# Patient Record
Sex: MALE | Race: WHITE | NOT HISPANIC OR LATINO | Employment: OTHER | URBAN - METROPOLITAN AREA
[De-identification: names, ages, dates, MRNs, and addresses within clinical notes are randomized per-mention and may not be internally consistent; named-entity substitution may affect disease eponyms.]

---

## 2017-01-17 ENCOUNTER — GENERIC CONVERSION - ENCOUNTER (OUTPATIENT)
Dept: OTHER | Facility: OTHER | Age: 72
End: 2017-01-17

## 2017-01-23 ENCOUNTER — GENERIC CONVERSION - ENCOUNTER (OUTPATIENT)
Dept: OTHER | Facility: OTHER | Age: 72
End: 2017-01-23

## 2017-01-30 ENCOUNTER — GENERIC CONVERSION - ENCOUNTER (OUTPATIENT)
Dept: OTHER | Facility: OTHER | Age: 72
End: 2017-01-30

## 2017-01-31 ENCOUNTER — GENERIC CONVERSION - ENCOUNTER (OUTPATIENT)
Dept: OTHER | Facility: OTHER | Age: 72
End: 2017-01-31

## 2017-02-06 ENCOUNTER — GENERIC CONVERSION - ENCOUNTER (OUTPATIENT)
Dept: OTHER | Facility: OTHER | Age: 72
End: 2017-02-06

## 2017-02-17 ENCOUNTER — GENERIC CONVERSION - ENCOUNTER (OUTPATIENT)
Dept: OTHER | Facility: OTHER | Age: 72
End: 2017-02-17

## 2017-02-28 ENCOUNTER — GENERIC CONVERSION - ENCOUNTER (OUTPATIENT)
Dept: OTHER | Facility: OTHER | Age: 72
End: 2017-02-28

## 2017-03-10 ENCOUNTER — GENERIC CONVERSION - ENCOUNTER (OUTPATIENT)
Dept: OTHER | Facility: OTHER | Age: 72
End: 2017-03-10

## 2017-03-31 ENCOUNTER — GENERIC CONVERSION - ENCOUNTER (OUTPATIENT)
Dept: OTHER | Facility: OTHER | Age: 72
End: 2017-03-31

## 2017-04-14 ENCOUNTER — GENERIC CONVERSION - ENCOUNTER (OUTPATIENT)
Dept: OTHER | Facility: OTHER | Age: 72
End: 2017-04-14

## 2017-04-19 ENCOUNTER — GENERIC CONVERSION - ENCOUNTER (OUTPATIENT)
Dept: OTHER | Facility: OTHER | Age: 72
End: 2017-04-19

## 2017-04-24 ENCOUNTER — GENERIC CONVERSION - ENCOUNTER (OUTPATIENT)
Dept: OTHER | Facility: OTHER | Age: 72
End: 2017-04-24

## 2017-04-27 ENCOUNTER — GENERIC CONVERSION - ENCOUNTER (OUTPATIENT)
Dept: OTHER | Facility: OTHER | Age: 72
End: 2017-04-27

## 2017-05-16 ENCOUNTER — ALLSCRIPTS OFFICE VISIT (OUTPATIENT)
Dept: OTHER | Facility: OTHER | Age: 72
End: 2017-05-16

## 2017-06-15 ENCOUNTER — GENERIC CONVERSION - ENCOUNTER (OUTPATIENT)
Dept: OTHER | Facility: OTHER | Age: 72
End: 2017-06-15

## 2017-06-26 ENCOUNTER — GENERIC CONVERSION - ENCOUNTER (OUTPATIENT)
Dept: OTHER | Facility: OTHER | Age: 72
End: 2017-06-26

## 2017-06-27 ENCOUNTER — LAB REQUISITION (OUTPATIENT)
Dept: LAB | Facility: HOSPITAL | Age: 72
End: 2017-06-27
Payer: MEDICARE

## 2017-06-27 DIAGNOSIS — N39.0 URINARY TRACT INFECTION: ICD-10-CM

## 2017-06-27 PROCEDURE — 87086 URINE CULTURE/COLONY COUNT: CPT | Performed by: UROLOGY

## 2017-06-28 ENCOUNTER — ANESTHESIA EVENT (OUTPATIENT)
Dept: PERIOP | Facility: HOSPITAL | Age: 72
End: 2017-06-28
Payer: MEDICARE

## 2017-06-28 ENCOUNTER — APPOINTMENT (OUTPATIENT)
Dept: LAB | Facility: HOSPITAL | Age: 72
End: 2017-06-28
Payer: MEDICARE

## 2017-06-28 ENCOUNTER — TRANSCRIBE ORDERS (OUTPATIENT)
Dept: LAB | Facility: HOSPITAL | Age: 72
End: 2017-06-28

## 2017-06-28 DIAGNOSIS — R31.0 GROSS HEMATURIA: Primary | ICD-10-CM

## 2017-06-28 LAB — BACTERIA UR CULT: NORMAL

## 2017-06-28 PROCEDURE — 88112 CYTOPATH CELL ENHANCE TECH: CPT

## 2017-07-03 ENCOUNTER — APPOINTMENT (OUTPATIENT)
Dept: RADIOLOGY | Facility: HOSPITAL | Age: 72
End: 2017-07-03
Payer: MEDICARE

## 2017-07-03 ENCOUNTER — HOSPITAL ENCOUNTER (OUTPATIENT)
Facility: HOSPITAL | Age: 72
Setting detail: OUTPATIENT SURGERY
Discharge: HOME | End: 2017-07-03
Attending: UROLOGY | Admitting: UROLOGY
Payer: MEDICARE

## 2017-07-03 ENCOUNTER — ANESTHESIA (OUTPATIENT)
Dept: PERIOP | Facility: HOSPITAL | Age: 72
End: 2017-07-03
Payer: MEDICARE

## 2017-07-03 VITALS
HEART RATE: 61 BPM | WEIGHT: 171 LBS | DIASTOLIC BLOOD PRESSURE: 63 MMHG | SYSTOLIC BLOOD PRESSURE: 131 MMHG | RESPIRATION RATE: 16 BRPM | BODY MASS INDEX: 24.48 KG/M2 | HEIGHT: 70 IN | OXYGEN SATURATION: 94 % | TEMPERATURE: 98.4 F

## 2017-07-03 DIAGNOSIS — N32.9 BLADDER DISORDER: ICD-10-CM

## 2017-07-03 PROCEDURE — 88305 TISSUE EXAM BY PATHOLOGIST: CPT | Performed by: UROLOGY

## 2017-07-03 PROCEDURE — C2625 STENT, NON-COR, TEM W/DEL SY: HCPCS | Performed by: UROLOGY

## 2017-07-03 PROCEDURE — 74450 X-RAY URETHRA/BLADDER: CPT

## 2017-07-03 PROCEDURE — 88342 IMHCHEM/IMCYTCHM 1ST ANTB: CPT | Performed by: UROLOGY

## 2017-07-03 DEVICE — STENT KWART RETRO INJECT SET 6FR 145CM: Type: IMPLANTABLE DEVICE | Site: BLADDER | Status: FUNCTIONAL

## 2017-07-03 RX ORDER — FENTANYL CITRATE 50 UG/ML
INJECTION, SOLUTION INTRAMUSCULAR; INTRAVENOUS AS NEEDED
Status: DISCONTINUED | OUTPATIENT
Start: 2017-07-03 | End: 2017-07-03 | Stop reason: SURG

## 2017-07-03 RX ORDER — ACETAMINOPHEN 325 MG/1
650 TABLET ORAL EVERY 6 HOURS PRN
Status: DISCONTINUED | OUTPATIENT
Start: 2017-07-03 | End: 2017-07-03 | Stop reason: HOSPADM

## 2017-07-03 RX ORDER — CEPHALEXIN 500 MG/1
500 CAPSULE ORAL EVERY 8 HOURS SCHEDULED
Qty: 12 CAPSULE | Refills: 0 | Status: SHIPPED | OUTPATIENT
Start: 2017-07-03 | End: 2017-07-07

## 2017-07-03 RX ORDER — ONDANSETRON 2 MG/ML
4 INJECTION INTRAMUSCULAR; INTRAVENOUS ONCE AS NEEDED
Status: DISCONTINUED | OUTPATIENT
Start: 2017-07-03 | End: 2017-07-03 | Stop reason: HOSPADM

## 2017-07-03 RX ORDER — FENTANYL CITRATE/PF 50 MCG/ML
50 SYRINGE (ML) INJECTION
Status: DISCONTINUED | OUTPATIENT
Start: 2017-07-03 | End: 2017-07-03 | Stop reason: HOSPADM

## 2017-07-03 RX ORDER — ONDANSETRON 2 MG/ML
INJECTION INTRAMUSCULAR; INTRAVENOUS AS NEEDED
Status: DISCONTINUED | OUTPATIENT
Start: 2017-07-03 | End: 2017-07-03 | Stop reason: SURG

## 2017-07-03 RX ORDER — EPHEDRINE SULFATE 50 MG/ML
INJECTION, SOLUTION INTRAVENOUS AS NEEDED
Status: DISCONTINUED | OUTPATIENT
Start: 2017-07-03 | End: 2017-07-03 | Stop reason: SURG

## 2017-07-03 RX ORDER — MAGNESIUM HYDROXIDE 1200 MG/15ML
LIQUID ORAL AS NEEDED
Status: DISCONTINUED | OUTPATIENT
Start: 2017-07-03 | End: 2017-07-03 | Stop reason: HOSPADM

## 2017-07-03 RX ORDER — LIDOCAINE HYDROCHLORIDE 10 MG/ML
INJECTION, SOLUTION INFILTRATION; PERINEURAL AS NEEDED
Status: DISCONTINUED | OUTPATIENT
Start: 2017-07-03 | End: 2017-07-03 | Stop reason: SURG

## 2017-07-03 RX ORDER — OXYCODONE HYDROCHLORIDE AND ACETAMINOPHEN 5; 325 MG/1; MG/1
1 TABLET ORAL EVERY 4 HOURS PRN
Status: DISCONTINUED | OUTPATIENT
Start: 2017-07-03 | End: 2017-07-03 | Stop reason: HOSPADM

## 2017-07-03 RX ORDER — SODIUM CHLORIDE 9 MG/ML
125 INJECTION, SOLUTION INTRAVENOUS CONTINUOUS
Status: DISCONTINUED | OUTPATIENT
Start: 2017-07-03 | End: 2017-07-03 | Stop reason: HOSPADM

## 2017-07-03 RX ORDER — PROPOFOL 10 MG/ML
INJECTION, EMULSION INTRAVENOUS AS NEEDED
Status: DISCONTINUED | OUTPATIENT
Start: 2017-07-03 | End: 2017-07-03 | Stop reason: SURG

## 2017-07-03 RX ORDER — ALBUTEROL SULFATE 2.5 MG/3ML
2.5 SOLUTION RESPIRATORY (INHALATION) ONCE AS NEEDED
Status: DISCONTINUED | OUTPATIENT
Start: 2017-07-03 | End: 2017-07-03 | Stop reason: HOSPADM

## 2017-07-03 RX ADMIN — LIDOCAINE HYDROCHLORIDE 50 MG: 10 INJECTION, SOLUTION INFILTRATION; PERINEURAL at 07:27

## 2017-07-03 RX ADMIN — DEXAMETHASONE SODIUM PHOSPHATE 4 MG: 10 INJECTION INTRAMUSCULAR; INTRAVENOUS at 07:31

## 2017-07-03 RX ADMIN — PROPOFOL 200 MG: 10 INJECTION, EMULSION INTRAVENOUS at 07:27

## 2017-07-03 RX ADMIN — CEFAZOLIN SODIUM 2000 MG: 2 SOLUTION INTRAVENOUS at 07:29

## 2017-07-03 RX ADMIN — EPHEDRINE SULFATE 7.5 MG: 50 INJECTION, SOLUTION INTRAMUSCULAR; INTRAVENOUS; SUBCUTANEOUS at 08:00

## 2017-07-03 RX ADMIN — ACETAMINOPHEN 650 MG: 325 TABLET, FILM COATED ORAL at 09:38

## 2017-07-03 RX ADMIN — FENTANYL CITRATE 100 MCG: 50 INJECTION, SOLUTION INTRAMUSCULAR; INTRAVENOUS at 07:27

## 2017-07-03 RX ADMIN — SODIUM CHLORIDE 125 ML/HR: 0.9 INJECTION, SOLUTION INTRAVENOUS at 06:29

## 2017-07-03 RX ADMIN — ONDANSETRON HYDROCHLORIDE 4 MG: 2 INJECTION, SOLUTION INTRAVENOUS at 07:31

## 2017-07-05 ENCOUNTER — GENERIC CONVERSION - ENCOUNTER (OUTPATIENT)
Dept: OTHER | Facility: OTHER | Age: 72
End: 2017-07-05

## 2017-07-11 ENCOUNTER — ALLSCRIPTS OFFICE VISIT (OUTPATIENT)
Dept: OTHER | Facility: OTHER | Age: 72
End: 2017-07-11

## 2017-07-24 ENCOUNTER — GENERIC CONVERSION - ENCOUNTER (OUTPATIENT)
Dept: OTHER | Facility: OTHER | Age: 72
End: 2017-07-24

## 2017-08-14 ENCOUNTER — GENERIC CONVERSION - ENCOUNTER (OUTPATIENT)
Dept: OTHER | Facility: OTHER | Age: 72
End: 2017-08-14

## 2017-08-22 ENCOUNTER — ALLSCRIPTS OFFICE VISIT (OUTPATIENT)
Dept: OTHER | Facility: OTHER | Age: 72
End: 2017-08-22

## 2017-08-25 ENCOUNTER — ALLSCRIPTS OFFICE VISIT (OUTPATIENT)
Dept: OTHER | Facility: OTHER | Age: 72
End: 2017-08-25

## 2017-09-07 ENCOUNTER — ALLSCRIPTS OFFICE VISIT (OUTPATIENT)
Dept: OTHER | Facility: OTHER | Age: 72
End: 2017-09-07

## 2017-09-13 ENCOUNTER — GENERIC CONVERSION - ENCOUNTER (OUTPATIENT)
Dept: OTHER | Facility: OTHER | Age: 72
End: 2017-09-13

## 2017-09-14 ENCOUNTER — GENERIC CONVERSION - ENCOUNTER (OUTPATIENT)
Dept: OTHER | Facility: OTHER | Age: 72
End: 2017-09-14

## 2017-11-01 DIAGNOSIS — C67.2 MALIGNANT NEOPLASM OF LATERAL WALL OF BLADDER (HCC): ICD-10-CM

## 2017-11-01 DIAGNOSIS — E78.5 HYPERLIPIDEMIA: ICD-10-CM

## 2017-11-01 DIAGNOSIS — R93.89 ABNORMAL FINDINGS ON DIAGNOSTIC IMAGING OF OTHER SPECIFIED BODY STRUCTURES: ICD-10-CM

## 2017-11-06 ENCOUNTER — GENERIC CONVERSION - ENCOUNTER (OUTPATIENT)
Dept: OTHER | Facility: OTHER | Age: 72
End: 2017-11-06

## 2017-11-09 ENCOUNTER — GENERIC CONVERSION - ENCOUNTER (OUTPATIENT)
Dept: OTHER | Facility: OTHER | Age: 72
End: 2017-11-09

## 2017-11-15 ENCOUNTER — GENERIC CONVERSION - ENCOUNTER (OUTPATIENT)
Dept: OTHER | Facility: OTHER | Age: 72
End: 2017-11-15

## 2017-11-24 ENCOUNTER — ALLSCRIPTS OFFICE VISIT (OUTPATIENT)
Dept: OTHER | Facility: OTHER | Age: 72
End: 2017-11-24

## 2017-11-25 ENCOUNTER — GENERIC CONVERSION - ENCOUNTER (OUTPATIENT)
Dept: OTHER | Facility: OTHER | Age: 72
End: 2017-11-25

## 2017-11-25 NOTE — PROGRESS NOTES
Assessment  Assessed    1  Chronic stable angina (413 9) (I20 8)   2  CAD (coronary artery disease) (414 00) (I25 10)   3  Hyperlipidemia (272 4) (E78 5)   4  Benign essential HTN (401 1) (I10)   5  RBBB (right bundle branch block) (426 4) (I45 10)   6  Abnormal CT scan (793 99) (R93 8)   7  Mitral regurgitation (424 0) (I34 0)    Plan  Abnormal CT scan    · (1) NT- BNP (PRO BRAIN NATRIURETIC PEPTIDE); Status:Active; Requestedfor:24Nov2017;    Perform:Merged with [facility]; AVZ:[id number]; Ordered; For:Abnormal CT scan; Ordered By:Bhargav Recinos;  Benign essential HTN, RBBB (right bundle branch block)    · EKG/ECG- POC; Status:Complete;   Done: 29RGK3077 02:58PM   Perform: In Office; Last Updated By:Cynthia Munoz; 11/24/2017 2:58:43 PM;Ordered;essential HTN, RBBB (right bundle branch block); Ordered By:Denise Recinos;    Discussion/Summary  Cardiology Discussion Summary Free Text Note Form [facility]: It is my impression that the patient had prolonged angina probably triggered by anemia from chemotherapy  He does have rather severe circumflex disease which was a non bypassed vessel in 2004  His ischemia on stress testing appear to be in that distribution  I do not feel doing cardiac catheterization will be fruitful since he was unable to have intervention there 2 years ago  His angina is gone now that his hemoglobin is better  We will continue metoprolol and ranolazine  Nitrates were added and we will continue these as well  He did have an abnormal lung exam at the bases  I doubt this represents heart failure but did order a BNP assay to rule out any possibility that congestive heart failure may be creating more angina  His blood pressure is quite good on beta-blockers and amlodipine  He did have moderate to severe mitral regurgitation on his echo but this is not audible and I suspect this was dynamic due to ischemia in the circumflex distribution at that time   He does have a bifascicular block but no evidence for bradycardia arrhythmias  His lipids were excellent on his current dose of atorvastatin which he will continue  I will see him in February is planned  We will follow up with him regarding his blood work in the interim  Chief Complaint  Chief Complaint Free Text Note Form: here ahead of time due to chest pain    in [facility] 11/16 to 11/18CAD s/p CABG 2004 (L-LAD, SVG to PDA with cath 2015 showing patent grafts with severe disease CX not amenable to PCI)has HTN and HLP   ? RVE and hypokinesis with moderate to severe MR on recent echo at [facility]: Patient is here today for follow up of chronic conditions described in HPI  History of Present Illness  Cardiology HPI Free Text Note Form [facility]: The patient had chemorx about 2 weeks ago  He did have increased chest pain and pain at rest and was admitted to [facility] last week  He had a mild troponin elevation  He was sent home on his usual meds plus imdur  He continued to have chest pain but was transfused on Tuesday and has not had angina since  He did have a hemoglobin of 9 prior to transfusion  He did have dyspnea with the angina  His stress test suggested ischemia in the distribution of the CX vessel  He does get some occasional ankle edema  He denies orthopnea  He denies palpitatons or lightheadedness  Review of Systems  Cardiology Male ROS:    Cardiac: chest pain-- and-- has swelling in the , but-- No complaints of chest pain, no palpitations, no fainiting ,-- no rhythm problems,-- no fainting/blackouts,-- no heart murmur present-- and-- no palpitations present  Skin: No complaints of nonhealing sores or skin rash    Genitourinary: frequent urination at night,-- loss of bladder control,-- kidney problems-- and-- prostate problems, but-- no recurrent urinary tract infections,-- no difficult urination,-- no blood in urine,-- no kidney stones-- and-- no erectile dysfunction  Psychological: difficulty concentrating, but-- no depression,-- no anxiety-- and-- no palpitations present  General: lack of energy/fatigue, but-- no trouble sleeping,-- no appetite changes,-- no changes in weight,-- no fever,-- no night sweats-- and-- no frequent infections  Respiratory: No complaints of shortness of breath, cough with sputum, or wheezing  HEENT: hearing problems, but-- No complaints of serious problems, hearing problems, nose problems, throat problems, or snoring ,-- no serious eye problems,-- no throat problems-- and-- no snoring  Gastrointestinal: No complaints of liver problems, nausea, vomiting, heartburn, constipation, bloody stools, diarrhea, problems swallowing, adbominal pain, or rectal bleeding  Hematologic: anemia, but-- no bleeding disorders,-- no blood clots-- and-- no excessive bruising  Neurological: No complaints of numbness, tingling, dizziness, weakness, seizures, headaches, syncope or fainting, AM fatigue, daytime sleepiness, no witnessed apnea episodes  Musculoskeletal: back pain, but-- no arthritis-- and-- no swelling/pain   ROS Reviewed:   ROS reviewed  Active Problems  Problems    1  Adenocarcinoma of kidney (189 0) (C64 9)   2  Benign essential HTN (401 1) (I10)   3  Benign essential tremor (333 1) (G25 0)   4  CAD (coronary artery disease) (414 00) (I25 10)   5  Chronic stable angina (413 9) (I20 8)   6  Gross hematuria (599 71) (R31 0)   7  Hyperlipidemia (272 4) (E78 5)   8  Incontinence (788 30) (R32)   9  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 (188 2) (C67 2)   10  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)   11  RBBB (right bundle branch block) (426 4) (I45 10)   12  Sleep disturbance (780 50) (G47 9)    Past Medical History  Problems    1  History of Acquired absence of genital organ (V45 77) (Z90 79)   2  Acquired absence of kidney (V45 73) (Z90 5)   3  History of Calculus, kidney, 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)   4  Gross hematuria (599 71) (R31 0)   5   History of acute cystitis (V13 02) (Z87 440)   6  History of hyperlipidemia (V12 29) (Z86 39)   7  History of hypertension (V12 59) (Z86 79)   8  History of malignant neoplasm of kidney (V10 52) (Z85 528)   9  History of malignant neoplasm of prostate (V10 46) (Z85 46)   10  History of malignant neoplasm of ureter (V10 59) (Z85 54)   11  History of urinary retention (V13 09) (Z87 898)   12  History of 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 (188 2) (C67 2)   13  Personal history of irradiation (V15 3) (Z92 3)   14  History of Primary malignant neoplasm of trigone of urinary bladder (188 0) (C67 0)   15  History of Primary malignant neoplasm of ureteric orifice of urinary bladder (188 6)  (C67 6)   16  History of Prostate cancer (185) (C61)   17  History of Stress incontinence, male (46 35) (N39 3)  Active Problems And Past Medical History Reviewed: The active problems and past medical history were reviewed and updated today  Surgical History  Problems    1  History of Appendectomy   2  History of Back Surgery   3  History of Coronary Artery Double Arterial Graft   4  History of Cystoscopy With Insertion Of Ureteral Stent   5  History of Cystoscopy With Resection Of Tumor   6  History of Cystoscopy With Ureteroscopy Left   7  History of Genito-Urinary Tract Surgery Prostatectomy   8  History of Needle Biopsy Of Prostate   9  History of Nephrectomy With Total Ureterectomy   10  History of Prostatectomy Radical   11  History of Radiation Therapy   12  History of Rotator Cuff Repair   13  History of Shoulder Surgery  Surgical History Reviewed: The surgical history was reviewed and updated today  Family History  Father    1  Family history of myocardial infarction (V17 3) (Z82 49)  Child    2  Family history of hyperlipidemia (V18 19) (Z83 49)  Family History Reviewed: The family history was reviewed and updated today         Social History  Problems    · Caffeine use (V49 89) (F15 90)   · Completed 12th grade   ·    · Never a smoker   · No drug use   · Social alcohol use (Z78 9)  Social History Reviewed: The social history was reviewed and updated today  The social history was reviewed and is unchanged  Current Meds   1  AmLODIPine Besylate 2 5 MG Oral Tablet; TAKE 1 TABLET DAILY; Therapy: (Recorded:54Bdm2551) to Recorded   2  Aspirin 81 MG TABS; Take 1 tablet daily; Therapy: (Recorded:55Rgj8841) to Recorded   3  Atorvastatin Calcium 20 MG Oral Tablet; TAKE 1 TABLET DAILY; Therapy: 48DLU3241 to Recorded   4  CARBOplatin 150 MG/15ML Intravenous Solution; AS DIRECTED   LISTED AS 8 /CARBOPLATOIN AUC5 D1 EVERY 21; Therapy: 55Dey8030 to Recorded   5  Gemzar 1 GM Intravenous Solution Reconstituted; USE AS DIRECTED; Therapy: 28Tns8670 to Recorded   6  Isosorbide Mononitrate ER 30 MG Oral Tablet Extended Release 24 Hour; TAKE 1 TABLET DAILY; Therapy: 21GFN6321 to (MXPVRSRE:62DJT8872) Recorded   7  Metoprolol Tartrate 25 MG Oral Tablet; TAKE 1 TABLET TWICE DAILY; Therapy: (Recorded:06May2015) to Recorded   8  Multi-Vitamins TABS; TAKE 1 TABLET DAILY; Therapy: (Recorded:55Grt9038) to Recorded   9  Ranexa 500 MG Oral Tablet Extended Release 12 Hour; Take 1 tablet twice daily; Therapy: (Recorded:83Ste1890) to Recorded   10  Vitamin B-12 TABS; TAKE 1 TABLET DAILY; Therapy: (Recorded:69Ony6363) to Recorded   11  Vitamin D3 2000 UNIT Oral Capsule; take 1 capsule daily; Therapy: (Recorded:12Nov2015) to Recorded   12  Zofran 4 MG Oral Tablet; Therapy: (Recorded:88Dzk0162) to Recorded  Medication List Reviewed: The medication list was reviewed and updated today  Allergies  Medication    1  Levaquin TABS    Vitals  Vital Signs    Recorded: 97ZWC1777 03:09PM   Heart Rate 79   Systolic 941   Diastolic 70   Weight 484 lb 8 oz   BMI Calculated 24 89   BSA Calculated 1 96       Physical Exam   Constitutional  General appearance: No acute distress, well appearing and well nourished    Eyes  Ophthalmoscopic examination: Abnormal  -- conjunctiva pale, sclera anicteric  Ears, Nose, Mouth, and Throat - Oropharynx: Clear, nares are clear, mucous membranes are moist   Neck  Neck and thyroid: Normal, supple, trachea midline, no thyromegaly  Pulmonary  Auscultation of lungs: Abnormal    Cardiovascular  Auscultation of heart: Abnormal  -- Regular with split S2  grade 1 systolic murmur at RUSB  Kaycee Flores no diastolic murmur rub or gallop noted  Carotid pulses: Normal, 2+ bilaterally  Pedal pulses: Abnormal  -- diminished in the LEs  Examination of extremities for edema and/or varicosities: Normal    Abdomen  Abdomen: Non-tender and no distention  Liver and spleen: No hepatomegaly or splenomegaly  Musculoskeletal Gait and station: Normal gait  -- Digits and nails: Normal without clubbing or cyanosis  -- Inspection/palpation of joints, bones, and muscles: Abnormal -- kyphosis  Neurologic - Cranial nerves: II - XII intact  -- Sensation: No sensory loss     Psychiatric - Orientation to person, place, and time: Normal -- Mood and affect: Normal       Future Appointments    Date/Time Provider Specialty Site   01/25/2018 08:45 AM James Cotter MD Urology [address]       Signatures   Electronically signed by : MINISTERIO Gallagher ; Nov 24 2017  4:12PM EST                       (Author)

## 2018-01-05 ENCOUNTER — HOSPITAL ENCOUNTER (INPATIENT)
Facility: HOSPITAL | Age: 73
LOS: 1 days | Discharge: HOME | DRG: 281 | End: 2018-01-06
Attending: EMERGENCY MEDICINE | Admitting: INTERNAL MEDICINE
Payer: MEDICARE

## 2018-01-05 ENCOUNTER — APPOINTMENT (EMERGENCY)
Dept: RADIOLOGY | Facility: HOSPITAL | Age: 73
DRG: 281 | End: 2018-01-05
Payer: MEDICARE

## 2018-01-05 DIAGNOSIS — R07.9 CHEST PAIN: Primary | ICD-10-CM

## 2018-01-05 DIAGNOSIS — D69.6 THROMBOCYTOPENIA (HCC): ICD-10-CM

## 2018-01-05 PROBLEM — I25.10 CAD (CORONARY ARTERY DISEASE): Chronic | Status: ACTIVE | Noted: 2018-01-05

## 2018-01-05 PROBLEM — D64.9 CHRONIC ANEMIA: Chronic | Status: ACTIVE | Noted: 2018-01-05

## 2018-01-05 PROBLEM — C67.9 TRANSITIONAL CELL BLADDER CANCER (HCC): Chronic | Status: ACTIVE | Noted: 2018-01-05

## 2018-01-05 PROBLEM — D72.829 LEUKOCYTOSIS: Status: ACTIVE | Noted: 2018-01-05

## 2018-01-05 PROBLEM — R06.00 DYSPNEA: Status: ACTIVE | Noted: 2018-01-05

## 2018-01-05 PROBLEM — E78.5 HYPERLIPIDEMIA: Chronic | Status: ACTIVE | Noted: 2018-01-05

## 2018-01-05 PROBLEM — N28.9 ACUTE KIDNEY INSUFFICIENCY: Status: ACTIVE | Noted: 2018-01-05

## 2018-01-05 PROBLEM — I45.10 RBBB: Chronic | Status: ACTIVE | Noted: 2018-01-05

## 2018-01-05 PROBLEM — I20.8 CHRONIC STABLE ANGINA (HCC): Chronic | Status: ACTIVE | Noted: 2018-01-05

## 2018-01-05 LAB
ALBUMIN SERPL BCP-MCNC: 3.5 G/DL (ref 3.5–5)
ALP SERPL-CCNC: 92 U/L (ref 46–116)
ALT SERPL W P-5'-P-CCNC: 114 U/L (ref 12–78)
ANION GAP SERPL CALCULATED.3IONS-SCNC: 8 MMOL/L (ref 4–13)
ANISOCYTOSIS BLD QL SMEAR: PRESENT
AST SERPL W P-5'-P-CCNC: 60 U/L (ref 5–45)
BASOPHILS # BLD MANUAL: 0 THOUSAND/UL (ref 0–0.1)
BASOPHILS NFR MAR MANUAL: 0 % (ref 0–1)
BILIRUB SERPL-MCNC: 0.55 MG/DL (ref 0.2–1)
BUN SERPL-MCNC: 26 MG/DL (ref 5–25)
CALCIUM SERPL-MCNC: 8.9 MG/DL (ref 8.3–10.1)
CHLORIDE SERPL-SCNC: 104 MMOL/L (ref 100–108)
CO2 SERPL-SCNC: 27 MMOL/L (ref 21–32)
CREAT SERPL-MCNC: 1.68 MG/DL (ref 0.6–1.3)
EOSINOPHIL # BLD MANUAL: 0.24 THOUSAND/UL (ref 0–0.4)
EOSINOPHIL NFR BLD MANUAL: 1 % (ref 0–6)
ERYTHROCYTE [DISTWIDTH] IN BLOOD BY AUTOMATED COUNT: 20.5 % (ref 11.6–15.1)
GFR SERPL CREATININE-BSD FRML MDRD: 39 ML/MIN/1.73SQ M
GLUCOSE SERPL-MCNC: 213 MG/DL (ref 65–140)
HCT VFR BLD AUTO: 30.8 % (ref 36.5–49.3)
HGB BLD-MCNC: 9.7 G/DL (ref 12–17)
LYMPHOCYTES # BLD AUTO: 0.49 THOUSAND/UL (ref 0.6–4.47)
LYMPHOCYTES # BLD AUTO: 2 % (ref 14–44)
MCH RBC QN AUTO: 33.4 PG (ref 26.8–34.3)
MCHC RBC AUTO-ENTMCNC: 31.5 G/DL (ref 31.4–37.4)
MCV RBC AUTO: 106 FL (ref 82–98)
METAMYELOCYTES NFR BLD MANUAL: 3 % (ref 0–1)
MONOCYTES # BLD AUTO: 0 THOUSAND/UL (ref 0–1.22)
MONOCYTES NFR BLD: 0 % (ref 4–12)
NEUTROPHILS # BLD MANUAL: 22.8 THOUSAND/UL (ref 1.85–7.62)
NEUTS BAND NFR BLD MANUAL: 34 % (ref 0–8)
NEUTS SEG NFR BLD AUTO: 60 % (ref 43–75)
PLATELET # BLD AUTO: 58 THOUSANDS/UL (ref 149–390)
PLATELET BLD QL SMEAR: ADEQUATE
PMV BLD AUTO: 9.7 FL (ref 8.9–12.7)
POTASSIUM SERPL-SCNC: 4.8 MMOL/L (ref 3.5–5.3)
PROT SERPL-MCNC: 7.4 G/DL (ref 6.4–8.2)
RBC # BLD AUTO: 2.9 MILLION/UL (ref 3.88–5.62)
SODIUM SERPL-SCNC: 139 MMOL/L (ref 136–145)
SPECIMEN SOURCE: NORMAL
TOTAL CELLS COUNTED SPEC: 100
TROPONIN I BLD-MCNC: 0.02 NG/ML (ref 0–0.08)
WBC # BLD AUTO: 24.25 THOUSAND/UL (ref 4.31–10.16)

## 2018-01-05 PROCEDURE — 93005 ELECTROCARDIOGRAM TRACING: CPT

## 2018-01-05 PROCEDURE — 96360 HYDRATION IV INFUSION INIT: CPT

## 2018-01-05 PROCEDURE — 85027 COMPLETE CBC AUTOMATED: CPT | Performed by: EMERGENCY MEDICINE

## 2018-01-05 PROCEDURE — 71046 X-RAY EXAM CHEST 2 VIEWS: CPT

## 2018-01-05 PROCEDURE — 84484 ASSAY OF TROPONIN QUANT: CPT

## 2018-01-05 PROCEDURE — 85007 BL SMEAR W/DIFF WBC COUNT: CPT | Performed by: EMERGENCY MEDICINE

## 2018-01-05 PROCEDURE — 93005 ELECTROCARDIOGRAM TRACING: CPT | Performed by: EMERGENCY MEDICINE

## 2018-01-05 PROCEDURE — 80053 COMPREHEN METABOLIC PANEL: CPT | Performed by: EMERGENCY MEDICINE

## 2018-01-05 PROCEDURE — 36415 COLL VENOUS BLD VENIPUNCTURE: CPT

## 2018-01-05 RX ORDER — ATORVASTATIN CALCIUM 40 MG/1
40 TABLET, FILM COATED ORAL EVERY EVENING
Status: DISCONTINUED | OUTPATIENT
Start: 2018-01-05 | End: 2018-01-06 | Stop reason: HOSPADM

## 2018-01-05 RX ORDER — HEPARIN SODIUM 5000 [USP'U]/ML
5000 INJECTION, SOLUTION INTRAVENOUS; SUBCUTANEOUS EVERY 8 HOURS SCHEDULED
Status: DISCONTINUED | OUTPATIENT
Start: 2018-01-05 | End: 2018-01-06

## 2018-01-05 RX ORDER — ONDANSETRON HYDROCHLORIDE 8 MG/1
TABLET, FILM COATED ORAL EVERY 8 HOURS PRN
COMMUNITY
End: 2018-07-20 | Stop reason: ALTCHOICE

## 2018-01-05 RX ORDER — ASPIRIN 81 MG/1
81 TABLET, CHEWABLE ORAL DAILY
Status: DISCONTINUED | OUTPATIENT
Start: 2018-01-06 | End: 2018-01-06 | Stop reason: HOSPADM

## 2018-01-05 RX ORDER — RANOLAZINE 500 MG/1
500 TABLET, EXTENDED RELEASE ORAL 2 TIMES DAILY
Status: DISCONTINUED | OUTPATIENT
Start: 2018-01-06 | End: 2018-01-06 | Stop reason: HOSPADM

## 2018-01-05 RX ORDER — LORAZEPAM 0.5 MG/1
0.5 TABLET ORAL EVERY 8 HOURS PRN
Status: DISCONTINUED | OUTPATIENT
Start: 2018-01-05 | End: 2018-01-06 | Stop reason: HOSPADM

## 2018-01-05 RX ORDER — MELATONIN
1000 DAILY
Status: DISCONTINUED | OUTPATIENT
Start: 2018-01-06 | End: 2018-01-06 | Stop reason: HOSPADM

## 2018-01-05 RX ORDER — CHOLECALCIFEROL (VITAMIN D3) 125 MCG
1000 CAPSULE ORAL DAILY
Status: DISCONTINUED | OUTPATIENT
Start: 2018-01-06 | End: 2018-01-06 | Stop reason: HOSPADM

## 2018-01-05 RX ORDER — ISOSORBIDE MONONITRATE 60 MG/1
60 TABLET, EXTENDED RELEASE ORAL DAILY
COMMUNITY
End: 2018-01-11 | Stop reason: HOSPADM

## 2018-01-05 RX ORDER — AMLODIPINE BESYLATE 2.5 MG/1
2.5 TABLET ORAL DAILY
Status: DISCONTINUED | OUTPATIENT
Start: 2018-01-06 | End: 2018-01-06 | Stop reason: HOSPADM

## 2018-01-05 RX ORDER — PROCHLORPERAZINE MALEATE 10 MG
10 TABLET ORAL EVERY 6 HOURS PRN
COMMUNITY
End: 2018-06-12 | Stop reason: ALTCHOICE

## 2018-01-05 RX ORDER — SODIUM CHLORIDE 9 MG/ML
75 INJECTION, SOLUTION INTRAVENOUS ONCE
Status: COMPLETED | OUTPATIENT
Start: 2018-01-05 | End: 2018-01-06

## 2018-01-05 RX ORDER — SENNOSIDES 8.6 MG
1 TABLET ORAL
Status: DISCONTINUED | OUTPATIENT
Start: 2018-01-05 | End: 2018-01-06 | Stop reason: HOSPADM

## 2018-01-05 RX ORDER — ASPIRIN 325 MG
325 TABLET ORAL ONCE
Status: DISCONTINUED | OUTPATIENT
Start: 2018-01-05 | End: 2018-01-05

## 2018-01-05 RX ORDER — ONDANSETRON 2 MG/ML
4 INJECTION INTRAMUSCULAR; INTRAVENOUS EVERY 6 HOURS PRN
Status: DISCONTINUED | OUTPATIENT
Start: 2018-01-05 | End: 2018-01-06 | Stop reason: HOSPADM

## 2018-01-05 RX ORDER — ISOSORBIDE MONONITRATE 60 MG/1
60 TABLET, EXTENDED RELEASE ORAL DAILY
Status: DISCONTINUED | OUTPATIENT
Start: 2018-01-06 | End: 2018-01-06

## 2018-01-05 RX ORDER — ASPIRIN 81 MG/1
81 TABLET, CHEWABLE ORAL DAILY
COMMUNITY

## 2018-01-05 RX ORDER — NITROGLYCERIN 0.4 MG/1
0.4 TABLET SUBLINGUAL
Status: DISCONTINUED | OUTPATIENT
Start: 2018-01-05 | End: 2018-01-06 | Stop reason: HOSPADM

## 2018-01-05 RX ORDER — LORAZEPAM 0.5 MG/1
TABLET ORAL EVERY 8 HOURS PRN
COMMUNITY
End: 2018-07-20 | Stop reason: ALTCHOICE

## 2018-01-05 RX ADMIN — SODIUM CHLORIDE 1000 ML: 0.9 INJECTION, SOLUTION INTRAVENOUS at 21:25

## 2018-01-06 ENCOUNTER — HOSPITAL ENCOUNTER (INPATIENT)
Facility: HOSPITAL | Age: 73
LOS: 5 days | Discharge: HOME | DRG: 281 | End: 2018-01-11
Attending: INTERNAL MEDICINE | Admitting: INTERNAL MEDICINE
Payer: MEDICARE

## 2018-01-06 VITALS
OXYGEN SATURATION: 92 % | DIASTOLIC BLOOD PRESSURE: 76 MMHG | BODY MASS INDEX: 24.34 KG/M2 | SYSTOLIC BLOOD PRESSURE: 156 MMHG | HEART RATE: 90 BPM | TEMPERATURE: 99.8 F | WEIGHT: 170 LBS | HEIGHT: 70 IN | RESPIRATION RATE: 18 BRPM

## 2018-01-06 DIAGNOSIS — I25.10 CAD (CORONARY ARTERY DISEASE): Chronic | ICD-10-CM

## 2018-01-06 DIAGNOSIS — D69.6 THROMBOCYTOPENIA (HCC): ICD-10-CM

## 2018-01-06 DIAGNOSIS — N28.9 ACUTE KIDNEY INSUFFICIENCY: ICD-10-CM

## 2018-01-06 DIAGNOSIS — N17.9 AKI (ACUTE KIDNEY INJURY) (HCC): ICD-10-CM

## 2018-01-06 DIAGNOSIS — D72.829 LEUKOCYTOSIS: ICD-10-CM

## 2018-01-06 DIAGNOSIS — C67.9 TRANSITIONAL CELL BLADDER CANCER (HCC): Chronic | ICD-10-CM

## 2018-01-06 DIAGNOSIS — R07.9 CHEST PAIN: Primary | ICD-10-CM

## 2018-01-06 PROBLEM — I21.9 MI (MYOCARDIAL INFARCTION) (HCC): Status: ACTIVE | Noted: 2018-01-06

## 2018-01-06 LAB
ANION GAP SERPL CALCULATED.3IONS-SCNC: 9 MMOL/L (ref 4–13)
APTT PPP: 28 SECONDS (ref 23–35)
BACTERIA UR QL AUTO: ABNORMAL /HPF
BILIRUB UR QL STRIP: NEGATIVE
BUN SERPL-MCNC: 22 MG/DL (ref 5–25)
CALCIUM SERPL-MCNC: 8.2 MG/DL (ref 8.3–10.1)
CHLORIDE SERPL-SCNC: 108 MMOL/L (ref 100–108)
CHOLEST SERPL-MCNC: 103 MG/DL (ref 50–200)
CLARITY UR: ABNORMAL
CO2 SERPL-SCNC: 22 MMOL/L (ref 21–32)
COLOR UR: YELLOW
CREAT SERPL-MCNC: 1.37 MG/DL (ref 0.6–1.3)
DAT POLY-SP REAG RBC QL: NEGATIVE
ERYTHROCYTE [DISTWIDTH] IN BLOOD BY AUTOMATED COUNT: 20.7 % (ref 11.6–15.1)
FIBRINOGEN PPP-MCNC: 337 MG/DL (ref 227–495)
GFR SERPL CREATININE-BSD FRML MDRD: 50 ML/MIN/1.73SQ M
GLUCOSE P FAST SERPL-MCNC: 114 MG/DL (ref 65–99)
GLUCOSE SERPL-MCNC: 114 MG/DL (ref 65–140)
GLUCOSE UR STRIP-MCNC: NEGATIVE MG/DL
HCT VFR BLD AUTO: 28 % (ref 36.5–49.3)
HDLC SERPL-MCNC: 34 MG/DL (ref 40–60)
HGB BLD-MCNC: 8.9 G/DL (ref 12–17)
HGB UR QL STRIP.AUTO: ABNORMAL
INR PPP: 1.12 (ref 0.86–1.16)
KETONES UR STRIP-MCNC: NEGATIVE MG/DL
LDLC SERPL CALC-MCNC: 44 MG/DL (ref 0–100)
LEUKOCYTE ESTERASE UR QL STRIP: ABNORMAL
MAGNESIUM SERPL-MCNC: 1.7 MG/DL (ref 1.6–2.6)
MCH RBC QN AUTO: 33.5 PG (ref 26.8–34.3)
MCHC RBC AUTO-ENTMCNC: 31.8 G/DL (ref 31.4–37.4)
MCV RBC AUTO: 105 FL (ref 82–98)
NITRITE UR QL STRIP: NEGATIVE
NON-SQ EPI CELLS URNS QL MICRO: ABNORMAL /HPF
PH UR STRIP.AUTO: 5.5 [PH] (ref 4.5–8)
PHOSPHATE SERPL-MCNC: 2.7 MG/DL (ref 2.3–4.1)
PLATELET # BLD AUTO: 38 THOUSANDS/UL (ref 149–390)
PLATELET # BLD AUTO: 42 THOUSANDS/UL (ref 149–390)
PMV BLD AUTO: 10.8 FL (ref 8.9–12.7)
PMV BLD AUTO: 11.2 FL (ref 8.9–12.7)
POTASSIUM SERPL-SCNC: 4.7 MMOL/L (ref 3.5–5.3)
PROT UR STRIP-MCNC: ABNORMAL MG/DL
PROTHROMBIN TIME: 14.4 SECONDS (ref 12.1–14.4)
RBC # BLD AUTO: 2.66 MILLION/UL (ref 3.88–5.62)
RBC #/AREA URNS AUTO: ABNORMAL /HPF
SODIUM SERPL-SCNC: 139 MMOL/L (ref 136–145)
SP GR UR STRIP.AUTO: 1.02 (ref 1–1.03)
TRIGL SERPL-MCNC: 124 MG/DL
TROPONIN I SERPL-MCNC: 3.24 NG/ML
TROPONIN I SERPL-MCNC: 5.2 NG/ML
TROPONIN I SERPL-MCNC: 5.81 NG/ML
UROBILINOGEN UR QL STRIP.AUTO: 0.2 E.U./DL
WBC # BLD AUTO: 21.56 THOUSAND/UL (ref 4.31–10.16)
WBC #/AREA URNS AUTO: ABNORMAL /HPF

## 2018-01-06 PROCEDURE — 85027 COMPLETE CBC AUTOMATED: CPT | Performed by: INTERNAL MEDICINE

## 2018-01-06 PROCEDURE — 83010 ASSAY OF HAPTOGLOBIN QUANT: CPT | Performed by: INTERNAL MEDICINE

## 2018-01-06 PROCEDURE — 84484 ASSAY OF TROPONIN QUANT: CPT | Performed by: INTERNAL MEDICINE

## 2018-01-06 PROCEDURE — 85610 PROTHROMBIN TIME: CPT | Performed by: INTERNAL MEDICINE

## 2018-01-06 PROCEDURE — 80048 BASIC METABOLIC PNL TOTAL CA: CPT | Performed by: INTERNAL MEDICINE

## 2018-01-06 PROCEDURE — 93005 ELECTROCARDIOGRAM TRACING: CPT | Performed by: INTERNAL MEDICINE

## 2018-01-06 PROCEDURE — 36415 COLL VENOUS BLD VENIPUNCTURE: CPT | Performed by: INTERNAL MEDICINE

## 2018-01-06 PROCEDURE — 83615 LACTATE (LD) (LDH) ENZYME: CPT | Performed by: INTERNAL MEDICINE

## 2018-01-06 PROCEDURE — 83735 ASSAY OF MAGNESIUM: CPT | Performed by: INTERNAL MEDICINE

## 2018-01-06 PROCEDURE — 85049 AUTOMATED PLATELET COUNT: CPT | Performed by: INTERNAL MEDICINE

## 2018-01-06 PROCEDURE — 87086 URINE CULTURE/COLONY COUNT: CPT | Performed by: INTERNAL MEDICINE

## 2018-01-06 PROCEDURE — 81001 URINALYSIS AUTO W/SCOPE: CPT | Performed by: INTERNAL MEDICINE

## 2018-01-06 PROCEDURE — 80061 LIPID PANEL: CPT | Performed by: INTERNAL MEDICINE

## 2018-01-06 PROCEDURE — 99285 EMERGENCY DEPT VISIT HI MDM: CPT

## 2018-01-06 PROCEDURE — 86880 COOMBS TEST DIRECT: CPT | Performed by: INTERNAL MEDICINE

## 2018-01-06 PROCEDURE — 83625 ASSAY OF LDH ENZYMES: CPT | Performed by: INTERNAL MEDICINE

## 2018-01-06 PROCEDURE — 87077 CULTURE AEROBIC IDENTIFY: CPT | Performed by: INTERNAL MEDICINE

## 2018-01-06 PROCEDURE — 84100 ASSAY OF PHOSPHORUS: CPT | Performed by: INTERNAL MEDICINE

## 2018-01-06 PROCEDURE — 87186 SC STD MICRODIL/AGAR DIL: CPT | Performed by: INTERNAL MEDICINE

## 2018-01-06 PROCEDURE — 85730 THROMBOPLASTIN TIME PARTIAL: CPT | Performed by: INTERNAL MEDICINE

## 2018-01-06 PROCEDURE — 85384 FIBRINOGEN ACTIVITY: CPT | Performed by: INTERNAL MEDICINE

## 2018-01-06 RX ORDER — NITROGLYCERIN 0.4 MG/1
0.4 TABLET SUBLINGUAL
Status: CANCELLED | OUTPATIENT
Start: 2018-01-06

## 2018-01-06 RX ORDER — MELATONIN
1000 DAILY
Status: DISCONTINUED | OUTPATIENT
Start: 2018-01-07 | End: 2018-01-11 | Stop reason: HOSPADM

## 2018-01-06 RX ORDER — FUROSEMIDE 10 MG/ML
40 INJECTION INTRAMUSCULAR; INTRAVENOUS ONCE
Status: DISCONTINUED | OUTPATIENT
Start: 2018-01-06 | End: 2018-01-06

## 2018-01-06 RX ORDER — ATORVASTATIN CALCIUM 40 MG/1
40 TABLET, FILM COATED ORAL EVERY EVENING
Status: CANCELLED | OUTPATIENT
Start: 2018-01-06

## 2018-01-06 RX ORDER — NITROGLYCERIN 0.4 MG/1
0.4 TABLET SUBLINGUAL
Status: DISCONTINUED | OUTPATIENT
Start: 2018-01-06 | End: 2018-01-11 | Stop reason: HOSPADM

## 2018-01-06 RX ORDER — ASPIRIN 81 MG/1
81 TABLET, CHEWABLE ORAL DAILY
Status: DISCONTINUED | OUTPATIENT
Start: 2018-01-07 | End: 2018-01-11 | Stop reason: HOSPADM

## 2018-01-06 RX ORDER — ISOSORBIDE MONONITRATE 30 MG/1
30 TABLET, EXTENDED RELEASE ORAL ONCE
Status: COMPLETED | OUTPATIENT
Start: 2018-01-06 | End: 2018-01-06

## 2018-01-06 RX ORDER — ATORVASTATIN CALCIUM 40 MG/1
40 TABLET, FILM COATED ORAL EVERY EVENING
Status: DISCONTINUED | OUTPATIENT
Start: 2018-01-06 | End: 2018-01-11 | Stop reason: HOSPADM

## 2018-01-06 RX ORDER — MELATONIN
1000 DAILY
Status: CANCELLED | OUTPATIENT
Start: 2018-01-07

## 2018-01-06 RX ORDER — ASPIRIN 81 MG/1
81 TABLET, CHEWABLE ORAL DAILY
Status: CANCELLED | OUTPATIENT
Start: 2018-01-07

## 2018-01-06 RX ORDER — ISOSORBIDE MONONITRATE 60 MG/1
60 TABLET, EXTENDED RELEASE ORAL DAILY
Status: DISCONTINUED | OUTPATIENT
Start: 2018-01-07 | End: 2018-01-09

## 2018-01-06 RX ORDER — CLOPIDOGREL BISULFATE 75 MG/1
300 TABLET ORAL DAILY
Status: DISCONTINUED | OUTPATIENT
Start: 2018-01-06 | End: 2018-01-06

## 2018-01-06 RX ORDER — ISOSORBIDE MONONITRATE 30 MG/1
30 TABLET, EXTENDED RELEASE ORAL DAILY
Status: DISCONTINUED | OUTPATIENT
Start: 2018-01-07 | End: 2018-01-06

## 2018-01-06 RX ORDER — MORPHINE SULFATE 2 MG/ML
1 INJECTION, SOLUTION INTRAMUSCULAR; INTRAVENOUS EVERY 4 HOURS PRN
Status: DISCONTINUED | OUTPATIENT
Start: 2018-01-06 | End: 2018-01-11 | Stop reason: HOSPADM

## 2018-01-06 RX ORDER — SODIUM CHLORIDE 9 MG/ML
75 INJECTION, SOLUTION INTRAVENOUS CONTINUOUS
Status: CANCELLED | OUTPATIENT
Start: 2018-01-06

## 2018-01-06 RX ORDER — FUROSEMIDE 10 MG/ML
20 INJECTION INTRAMUSCULAR; INTRAVENOUS ONCE
Status: COMPLETED | OUTPATIENT
Start: 2018-01-06 | End: 2018-01-06

## 2018-01-06 RX ORDER — MORPHINE SULFATE 2 MG/ML
1 INJECTION, SOLUTION INTRAMUSCULAR; INTRAVENOUS EVERY 4 HOURS PRN
Status: CANCELLED | OUTPATIENT
Start: 2018-01-06

## 2018-01-06 RX ORDER — ISOSORBIDE MONONITRATE 30 MG/1
30 TABLET, EXTENDED RELEASE ORAL DAILY
Status: CANCELLED | OUTPATIENT
Start: 2018-01-07

## 2018-01-06 RX ORDER — LORAZEPAM 0.5 MG/1
0.5 TABLET ORAL EVERY 8 HOURS PRN
Status: DISCONTINUED | OUTPATIENT
Start: 2018-01-06 | End: 2018-01-11 | Stop reason: HOSPADM

## 2018-01-06 RX ORDER — CLOPIDOGREL BISULFATE 75 MG/1
75 TABLET ORAL DAILY
Status: DISCONTINUED | OUTPATIENT
Start: 2018-01-07 | End: 2018-01-09

## 2018-01-06 RX ORDER — SENNOSIDES 8.6 MG
1 TABLET ORAL
Status: DISCONTINUED | OUTPATIENT
Start: 2018-01-06 | End: 2018-01-11 | Stop reason: HOSPADM

## 2018-01-06 RX ORDER — MORPHINE SULFATE 2 MG/ML
1 INJECTION, SOLUTION INTRAMUSCULAR; INTRAVENOUS EVERY 4 HOURS PRN
Status: DISCONTINUED | OUTPATIENT
Start: 2018-01-06 | End: 2018-01-06 | Stop reason: HOSPADM

## 2018-01-06 RX ORDER — AMLODIPINE BESYLATE 2.5 MG/1
2.5 TABLET ORAL DAILY
Status: CANCELLED | OUTPATIENT
Start: 2018-01-07

## 2018-01-06 RX ORDER — CHOLECALCIFEROL (VITAMIN D3) 125 MCG
1000 CAPSULE ORAL DAILY
Status: DISCONTINUED | OUTPATIENT
Start: 2018-01-07 | End: 2018-01-11 | Stop reason: HOSPADM

## 2018-01-06 RX ORDER — SODIUM CHLORIDE 9 MG/ML
75 INJECTION, SOLUTION INTRAVENOUS CONTINUOUS
Status: DISCONTINUED | OUTPATIENT
Start: 2018-01-06 | End: 2018-01-06 | Stop reason: HOSPADM

## 2018-01-06 RX ORDER — RANOLAZINE 500 MG/1
500 TABLET, EXTENDED RELEASE ORAL 2 TIMES DAILY
Status: DISCONTINUED | OUTPATIENT
Start: 2018-01-06 | End: 2018-01-07

## 2018-01-06 RX ORDER — ASPIRIN 325 MG
325 TABLET, DELAYED RELEASE (ENTERIC COATED) ORAL ONCE
Status: COMPLETED | OUTPATIENT
Start: 2018-01-06 | End: 2018-01-06

## 2018-01-06 RX ORDER — ISOSORBIDE MONONITRATE 30 MG/1
30 TABLET, EXTENDED RELEASE ORAL DAILY
Status: DISCONTINUED | OUTPATIENT
Start: 2018-01-06 | End: 2018-01-06 | Stop reason: HOSPADM

## 2018-01-06 RX ORDER — SODIUM CHLORIDE 9 MG/ML
75 INJECTION, SOLUTION INTRAVENOUS CONTINUOUS
Status: DISCONTINUED | OUTPATIENT
Start: 2018-01-06 | End: 2018-01-06

## 2018-01-06 RX ORDER — AMLODIPINE BESYLATE 2.5 MG/1
2.5 TABLET ORAL DAILY
Status: DISCONTINUED | OUTPATIENT
Start: 2018-01-07 | End: 2018-01-09

## 2018-01-06 RX ORDER — LORAZEPAM 0.5 MG/1
0.5 TABLET ORAL EVERY 8 HOURS PRN
Status: CANCELLED | OUTPATIENT
Start: 2018-01-06

## 2018-01-06 RX ORDER — ONDANSETRON 2 MG/ML
4 INJECTION INTRAMUSCULAR; INTRAVENOUS EVERY 6 HOURS PRN
Status: DISCONTINUED | OUTPATIENT
Start: 2018-01-06 | End: 2018-01-11 | Stop reason: HOSPADM

## 2018-01-06 RX ORDER — RANOLAZINE 500 MG/1
500 TABLET, EXTENDED RELEASE ORAL 2 TIMES DAILY
Status: CANCELLED | OUTPATIENT
Start: 2018-01-06

## 2018-01-06 RX ORDER — SENNOSIDES 8.6 MG
1 TABLET ORAL
Status: CANCELLED | OUTPATIENT
Start: 2018-01-06

## 2018-01-06 RX ORDER — ASPIRIN 81 MG/1
81 TABLET, CHEWABLE ORAL DAILY
Status: DISCONTINUED | OUTPATIENT
Start: 2018-01-07 | End: 2018-01-06 | Stop reason: HOSPADM

## 2018-01-06 RX ORDER — CHOLECALCIFEROL (VITAMIN D3) 125 MCG
1000 CAPSULE ORAL DAILY
Status: CANCELLED | OUTPATIENT
Start: 2018-01-07

## 2018-01-06 RX ORDER — ONDANSETRON 2 MG/ML
4 INJECTION INTRAMUSCULAR; INTRAVENOUS EVERY 6 HOURS PRN
Status: CANCELLED | OUTPATIENT
Start: 2018-01-06

## 2018-01-06 RX ADMIN — AMLODIPINE BESYLATE 2.5 MG: 2.5 TABLET ORAL at 08:22

## 2018-01-06 RX ADMIN — METOPROLOL TARTRATE 25 MG: 25 TABLET ORAL at 16:27

## 2018-01-06 RX ADMIN — HEPARIN SODIUM 5000 UNITS: 5000 INJECTION, SOLUTION INTRAVENOUS; SUBCUTANEOUS at 01:00

## 2018-01-06 RX ADMIN — CHOLECALCIFEROL TAB 25 MCG (1000 UNIT) 1000 UNITS: 25 TAB at 08:20

## 2018-01-06 RX ADMIN — NITROGLYCERIN 0.4 MG: 0.4 TABLET SUBLINGUAL at 04:40

## 2018-01-06 RX ADMIN — FUROSEMIDE 20 MG: 10 INJECTION, SOLUTION INTRAMUSCULAR; INTRAVENOUS at 16:28

## 2018-01-06 RX ADMIN — ASPIRIN 325 MG: 325 TABLET, COATED ORAL at 06:00

## 2018-01-06 RX ADMIN — ATORVASTATIN CALCIUM 40 MG: 40 TABLET, FILM COATED ORAL at 17:20

## 2018-01-06 RX ADMIN — RANOLAZINE 500 MG: 500 TABLET, FILM COATED, EXTENDED RELEASE ORAL at 17:20

## 2018-01-06 RX ADMIN — NITROGLYCERIN 1 INCH: 20 OINTMENT TOPICAL at 06:00

## 2018-01-06 RX ADMIN — RANOLAZINE 500 MG: 500 TABLET, FILM COATED, EXTENDED RELEASE ORAL at 08:20

## 2018-01-06 RX ADMIN — CYANOCOBALAMIN TAB 500 MCG 1000 MCG: 500 TAB at 08:20

## 2018-01-06 RX ADMIN — SODIUM CHLORIDE 75 ML/HR: 0.9 INJECTION, SOLUTION INTRAVENOUS at 00:43

## 2018-01-06 RX ADMIN — METOPROLOL TARTRATE 25 MG: 25 TABLET ORAL at 08:22

## 2018-01-06 RX ADMIN — ISOSORBIDE MONONITRATE 30 MG: 30 TABLET, EXTENDED RELEASE ORAL at 08:20

## 2018-01-06 RX ADMIN — CLOPIDOGREL BISULFATE 300 MG: 75 TABLET ORAL at 08:20

## 2018-01-06 RX ADMIN — ISOSORBIDE MONONITRATE 30 MG: 30 TABLET, EXTENDED RELEASE ORAL at 17:21

## 2018-01-06 RX ADMIN — Medication 1 TABLET: at 08:20

## 2018-01-06 NOTE — SOCIAL WORK
Met with patient and wife to complete assessment and explain role of CM  They live in ranch style home with one MALCOM  Wife is retired and available to help and transport at discharge  Prior to admission the patient was independent  He has no DME and no hx of HHC, snf, MH or D&A treatment  Advance directive copy was placed on patient's chart and wife is POA  Patient has a prescription plan and uses [employer], [place]  CM reviewed d/c planning process including the following: identifying help at home, patient preference for d/c planning needs, Discharge Lounge, [facility] Meds to Bed program, availability of treatment team to discuss questions or concerns patient and/or family may have regarding understanding medications and recognizing signs and symptoms once discharged  CM also encouraged patient to follow up with all recommended appointments after discharge  Patient advised of importance for patient and family to participate in managing patients medical well being

## 2018-01-06 NOTE — H&P
History obtained from spouse and child, chart review and the patient  Chief complaint:  Chest pain of 1 day duration  HPI this is a 56-year-old male presenting with substernal chest pain severe intensity associated with dyspnea when picking up a meal around 5 30 p m  this evening  Patient denied nausea vomiting or radiation, pain was localized to the chest area  Pain was relieved with 1 nitro Suboxone per 15 minutes, patient then took another sublingual nitro which relieved pain to 2/10 intensity  Patient then called 911 in the ambulance on the way to ER patient was given 325 mg of aspirin  In the ER patient had troponin drawn which was 0 02 vital signs patient was mildly tachycardic although stable blood pressure 96% on room air  Chest x-ray results pending  EKG showed normal sinus rhythm with right bundle branch block left anterior fascicular block septal infarct age undetermined  Patient has a history of right bundle branch block  Other pertinent labs hemoglobin 9 7 which has been around close to his baseline  Platelet count 58 from baseline of 96  Creatinine on 12/24 was 1 34 GFR was 52 patient's current creatinine is 1 68  Last alk-phos on 12/24 was 122 current 92  AST and ALT also elevated 60/114 respectively on 12/24 they were 53/24  Patient had Neulasta yesterday, he also had Gemzar the last two Wednesdays  Patient is ambulatory  Also has chronic urinary incontinence  Denies fevers chills nausea vomiting  Denies cough  He has an advanced directive living will stating his wife Alex Gallego is POA, also stating he is do  not resuscitate      Active Ambulatory Problems     Diagnosis Date Noted    Chronic anemia 01/05/2018    Chronic stable angina [facility] ) 01/05/2018    CAD (coronary artery disease) 01/05/2018    Hyperlipidemia 01/05/2018    RBBB 01/05/2018    Transitional cell bladder cancer ([facility] ) 01/05/2018     Resolved Ambulatory Problems     Diagnosis Date Noted    No Resolved Ambulatory Problems     Past Medical History:   Diagnosis Date    Anesthesia     Basal cell carcinoma     Coronary artery disease     Essential tremor     Hearing aid worn     History of kidney stones     History of pneumonia     History of prostate cancer     Hx of radiation therapy     Hyperlipidemia     Hypertension     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)     Urinary incontinence     Wears glasses     Wears partial dentures      Active Ambulatory Problems     Diagnosis Date Noted    Chronic anemia 01/05/2018    Chronic stable angina ([facility] ) 01/05/2018    CAD (coronary artery disease) 01/05/2018    Hyperlipidemia 01/05/2018    RBBB 01/05/2018    [facility]/2018     Resolved Ambulatory Problems     Diagnosis Date Noted    No Resolved Ambulatory Problems     Past Medical History:   Diagnosis Date    Anesthesia     Basal cell carcinoma     Coronary artery disease     Essential tremor     Hearing aid worn     History of kidney stones     History of pneumonia     History of prostate cancer     Hx of radiation therapy     Hyperlipidemia     Hypertension     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)     Urinary incontinence     Wears glasses     Wears partial dentures       Past Surgical History:   Procedure Laterality Date    APPENDECTOMY      BACK SURGERY      lumbar herniated disc repair    CARDIAC CATHETERIZATION      CORONARY ARTERY BYPASS GRAFT      x2 in 2004    CYSTOSCOPY      CYSTOSCOPY N/A 7/3/2017    Procedure: BLADDER BIOPSY;  Surgeon: Richi Grimm MD;  Location: [facility];  Service: Urology    CYSTOSCOPY W/ RETROGRADES Right 7/3/2017    Procedure: CYSTOSCOPY WITH RETROGRADE PYELOGRAM;  Surgeon: Richi Grimm MD;  Location: [facility];  Service: Urology    HERNIA REPAIR      NEPHRECTOMY Left     and left ureter    PROSTATECTOMY      ROTATOR CUFF REPAIR Left     SHOULDER SURGERY      dislocation Social History     Nonsmoker  Rare drinker    Family history: father had coronary artery disease and  of [place]  ROS:   Constitutional negative for diaphoresis or fever  HEENT negative for dental problems  Eyes wears glasses  Hearing wears hearing aids  Cardiovascular as above HPI, denies orthopnea PND or leg edema   as above, denies hematuria  Musculoskeletal negative for back pain neck pain or neck stiffness  Skin negative for rashes  Neuro negative for dizziness weakness lightheadedness  Psychiatric negative for agitation  All other systems negative  Physical exam  Vitals:    18 2225   BP: 130/67   Pulse: 76   Resp: 18   Temp:    SpO2: 96%     General no apparent distress appears appropriate age  HEENT glasses hearing aids  moist mucous membranes  Lungs clear to auscultation bilaterally  No crackles rhonchi  Cardiac S1-S2 regular rhythm 2/6 systolic murmur  Abdomen: soft nontender nondistended positive bowel sounds  Extremities no edema  Skin no rashes  Labs use as above  Patient Active Problem List   Diagnosis    Chest pain    Leukocytosis    Chronic anemia    Acute kidney insufficiency    Chronic stable angina (HCC)    Dyspnea    CAD (coronary artery disease)    Hyperlipidemia    RBBB    Transitional cell bladder cancer (HCC)       Assessment plan:    Place under observation    Chest pain:  Cardiology consult follow troponins check EKG in the morning aspirin oxygen morphine p r n  sublingual nitro p r n  continue Ranexa continue Imdur  continue beta-blocker    Leukocytosis:  UA negative, most likely due to Neulasta if patient is febrile will obtain blood culture  Thrombocytopenia:  Will follow most likely due to recent chemotherapy  Transaminases:  Most likely due to recent chemo  CAD/hypertension:  Continue meds as above continue Norvasc    HARRIET:  Possibly prerenal  Will give IV fluids recheck creatinine tomorrow      Hyperlipidemia:  Continue statin give high-dose  Recheck lipids in the morning    Code status full DNR OK to intubation  Sheela Raza spouse is POA  Total time 45 minutes with greater than or equal to 50% time involved in patient counseling and coordination of care    Addendum at 5:31 am  Patient complaining of substernal moderate chest pain  aroudn 4:30 on minimal exertion, no dyspnea  Elevated troponin 3 24 to 5 81 with concerning ST depression in anterior leads  Pain free after sl nitro  Vitals are stable    Paged Dr Himanshu Ramirez Cardiology service oncall around 4:45 am after EKG's reviewed  Discussed case with Dr Sudha Gillis who recommended the following:   -plavix 300 mg  -another asa 325 mg then 81 daily  -gentle hydration  -nitro paste 1 inch, decrease imdur to 30 mg daily  -increase metoprolol to 25 mg tid  -continue statin  -Dr Sudha Gillis aware of platelets dropping  I have also ordered heme onc consult, hit antibody, dic workup, to consider [facility] [address]  Cardiology to see in am for possible transfer for cath vs medical management for nonstemi  Discussed with patient      Rafael Peralta MD

## 2018-01-06 NOTE — PROGRESS NOTES
Patient returned from the bathroom and was c/o of chest pain 8/10 and shortness of breath  Troponin and EKG performed prior to patient going into bathroom  [facility] nocturnist called and informed  Patient VS stable  Patient received one sublingual nitro  Pain completely subsided after first dose  VS remained stable  EKG was repeated and shown to [facility] sundarurnist  Will continue to monitor patient for possible future chest pain

## 2018-01-06 NOTE — ED PROVIDER NOTES
History  Chief Complaint   Patient presents with    Chest Pain     Pt reports onset of CP intermittent since 1530 when taking out mail, arrives via EMS, took 2 nitro PTA, "heavy" substernal, denies radiation/NV/diaphoresis, pt a a o x 3 upon arrival to ED  73 YO male presents with chest pain starting tonight  Pt states the pain started after he was coming in from outside  Notes this was a pressure over the anterior chest, non-radiating, associated with shortness of breath  Pt denies associated lightheadedness, palpitations, nausea, vomiting or diaphoresis  Pt took 2 nitroglycerin and states the pain did improve  EMS was called and the pt received 325mg of aspirin en route  He arrives to the ED with no complaints  Pt states he has had similar symptoms in the past, was recently at [facility] for similar, states it has been returning more frequently and with less exertion  Pt does have a Hx of CABG and was told he continued to have blockages on his last catheterization  Pt denies F/C/N/V/D/C, no dysuria, burning on urination or blood in urine  History provided by:  Patient and spouse   used: No    Chest Pain   Pain location:  Substernal area  Pain quality: pressure    Pain radiates to the back: no    Pain severity:  Moderate  Onset quality:  Sudden  Duration:  1 hour  Timing:  Intermittent  Progression:  Waxing and waning  Relieved by:  Nitroglycerin  Worsened by:  Nothing tried  Associated symptoms: shortness of breath    Associated symptoms: no abdominal pain, no anxiety, no back pain, no cough, no diaphoresis, no dizziness, no fever, no nausea, no near-syncope, no palpitations, not vomiting and no weakness    Shortness of breath:     Severity:  Moderate    Onset quality:  Sudden    Duration:  1 hour    Timing:  Intermittent    Progression:  Waxing and waning      Prior to Admission Medications   Prescriptions Last Dose Informant Patient Reported? Taking?    Cholecalciferol (D3-1000) 1000 units capsule   Yes Yes   Sig: Take 1,000 Units by mouth daily   Cyanocobalamin (B-12) 1000 MCG CAPS   Yes Yes   Sig: Take by mouth daily   LORazepam (ATIVAN) 0 5 mg tablet   Yes Yes   Sig: Take by mouth every 8 (eight) hours as needed for anxiety   Multiple Vitamin (MULTIVITAMIN) tablet   Yes Yes   Sig: Take 1 tablet by mouth daily   Nitroglycerin (NITROTAB SL)   Yes Yes   Sig: Place under the tongue every 5 (five) minutes as needed "never used"   amLODIPine (NORVASC) 2 5 mg tablet   Yes Yes   Sig: Take 2 5 mg by mouth daily   aspirin 81 mg chewable tablet   Yes Yes   Sig: Chew 81 mg daily   atorvastatin (LIPITOR) 20 mg tablet   Yes Yes   Sig: Take 20 mg by mouth daily after dinner   isosorbide mononitrate (IMDUR) 60 mg 24 hr tablet   Yes Yes   Sig: Take 60 mg by mouth daily   metoprolol tartrate (LOPRESSOR) 25 mg tablet   Yes Yes   Sig: Take 25 mg by mouth every 12 (twelve) hours   ondansetron (ZOFRAN) 8 mg tablet   Yes Yes   Sig: Take by mouth every 8 (eight) hours as needed for nausea or vomiting   prochlorperazine (COMPAZINE) 10 mg tablet   Yes Yes   Sig: Take 10 mg by mouth every 6 (six) hours as needed for nausea or vomiting   ranolazine (RANEXA) 500 mg 12 hr tablet   Yes Yes   Sig: Take 500 mg by mouth 2 (two) times a day      Facility-Administered Medications: None       Past Medical History:   Diagnosis Date    Anesthesia     "can't urinate after surgery"    Basal cell carcinoma     Coronary artery disease     Essential tremor     of head and hands bilat    Hearing aid worn     bilat    History of kidney stones     History of pneumonia     childhood    History of prostate cancer     Hx of radiation therapy     2007 for after prostate surgery    Hyperlipidemia     Hypertension     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)     and" left kidney and left ureter removed"    Urinary incontinence     wears Depends    Wears glasses     Wears partial dentures     lower       Past Surgical History:   Procedure Laterality Date    APPENDECTOMY      BACK SURGERY      lumbar herniated disc repair    CARDIAC CATHETERIZATION      CORONARY ARTERY BYPASS GRAFT      x2 in 2004   [address] [facility] [address] N/A 7/3/2017    Procedure: BLADDER BIOPSY;  Surgeon: Jose Bucio MD;  Location: [facility];  Service: Urology    CYSTOSCOPY W/ RETROGRADES Right 7/3/2017    Procedure: CYSTOSCOPY WITH RETROGRADE PYELOGRAM;  Surgeon: Jose Bucio MD;  Location: [facility];  Service: Urology    HERNIA REPAIR      NEPHRECTOMY Left     and left ureter    PROSTATECTOMY      ROTATOR CUFF REPAIR Left     SHOULDER SURGERY      dislocation       History reviewed  No pertinent family history  I have reviewed and agree with the history as documented  Social History   Substance Use Topics    Smoking status: Never Smoker    Smokeless tobacco: Never Used    Alcohol use Yes      Comment: RARELY        Review of Systems   Constitutional: Negative for diaphoresis and fever  HENT: Negative for dental problem  Eyes: Negative for visual disturbance  Respiratory: Positive for shortness of breath  Negative for cough  Cardiovascular: Positive for chest pain  Negative for palpitations and near-syncope  Gastrointestinal: Negative for abdominal pain, nausea and vomiting  Genitourinary: Negative for dysuria and frequency  Musculoskeletal: Negative for back pain, neck pain and neck stiffness  Skin: Negative for rash  Neurological: Negative for dizziness, weakness and light-headedness  Psychiatric/Behavioral: Negative for agitation, behavioral problems and confusion  All other systems reviewed and are negative        Physical Exam  ED Triage Vitals [01/05/18 2039]   Temperature Pulse Respirations Blood Pressure SpO2   98 5 °F (36 9 °C) 105 20 130/91 96 %      Temp Source Heart Rate Source Patient Position - Orthostatic VS BP Location FiO2 (%)   Oral Monitor Lying Left arm --      Pain Score       2 Orthostatic Vital Signs  Vitals:    01/05/18 2039 01/05/18 2126   BP: 130/91 132/63   Pulse: 105 79   Patient Position - Orthostatic VS: Lying Lying       Physical Exam   Constitutional: He is oriented to person, place, and time  He appears well-developed and well-nourished  HENT:   Head: Normocephalic and atraumatic  Eyes: EOM are normal    Neck: Normal range of motion  Cardiovascular: Normal rate, regular rhythm and normal heart sounds  Pulmonary/Chest: Effort normal and breath sounds normal    Abdominal: Soft  There is no tenderness  Musculoskeletal: Normal range of motion  Neurological: He is alert and oriented to person, place, and time  Skin: Skin is warm and dry  Psychiatric: He has a normal mood and affect  His behavior is normal    Nursing note and vitals reviewed        ED Medications  Medications   sodium chloride 0 9 % bolus 1,000 mL (1,000 mL Intravenous New Bag 1/5/18 2125)       Diagnostic Studies  Results Reviewed     Procedure Component Value Units Date/Time    CBC and differential [[id number]]  (Abnormal) Collected:  01/05/18 2042    Lab Status:  Final result Specimen:  Blood from Arm, Left Updated:  01/05/18 2133     WBC 24 25 (H) Thousand/uL      RBC 2 90 (L) Million/uL      Hemoglobin 9 7 (L) g/dL      Hematocrit 30 8 (L) %       (H) fL      MCH 33 4 pg      MCHC 31 5 g/dL      RDW 20 5 (H) %      MPV 9 7 fL      Platelets 58 (L) Thousands/uL     Comprehensive metabolic panel [[id number]]  (Abnormal) Collected:  01/05/18 2042    Lab Status:  Final result Specimen:  Blood from Arm, Left Updated:  01/05/18 2102     Sodium 139 mmol/L      Potassium 4 8 mmol/L      Chloride 104 mmol/L      CO2 27 mmol/L      Anion Gap 8 mmol/L      BUN 26 (H) mg/dL      Creatinine 1 68 (H) mg/dL      Glucose 213 (H) mg/dL      Calcium 8 9 mg/dL      AST 60 (H) U/L       (H) U/L      Alkaline Phosphatase 92 U/L      Total Protein 7 4 g/dL      Albumin 3 5 g/dL      Total Bilirubin 0 55 mg/dL      eGFR 39 ml/min/1 73sq m     Narrative:         National Kidney Disease Education Program recommendations are as follows:  GFR calculation is accurate only with a steady state creatinine  Chronic Kidney disease less than 60 ml/min/1 73 sq  meters  Kidney failure less than 15 ml/min/1 73 sq  meters  POCT troponin [[id number]]  (Normal) Collected:  01/05/18 2042    Lab Status:  Final result Updated:  01/05/18 2055     POC Troponin I 0 02 ng/ml      Specimen Type VENOUS    Narrative:         [employer] i-Stat handheld analyzer 99% cutoff is > 0 08ng/mL in network Emergency Departments    o cTnI 99% cutoff is useful only when applied to patients in the clinical setting of myocardial ischemia  o cTnI 99% cutoff should be interpreted in the context of clinical history, ECG findings and possibly cardiac imaging to establish correct diagnosis  o cTnI 99% cutoff may be suggestive but clearly not indicative of a coronary event without the clinical setting of myocardial ischemia  X-ray chest 2 views    (Results Pending)              Procedures  ECG 12 Lead Documentation  Date/Time: 1/5/2018 10:32 PM  Performed by: Saige Cain  Authorized by: Rashad GARCIA     ECG reviewed by me, the ED Provider: yes    Patient location:  ED  Interpretation:     Interpretation: non-specific    Rate:     ECG rate assessment: normal    Rhythm:     Rhythm: sinus rhythm    QRS:     QRS axis:  Normal    QRS intervals:  Normal  Conduction:     Conduction: abnormal      Abnormal conduction: complete RBBB    ST segments:     ST segments:  Normal  T waves:     T waves: normal    Comments:      Reading from previous ECG at [facility] (12/19/17) also shows RBBB           Phone Contacts  ED Phone Contact    ED Course  ED Course                                MDM  Number of Diagnoses or Management Options  Chest pain: new and requires workup  Diagnosis management comments: 1   Chest pain - Pt with known CAD, symptoms concerning for unstable angina  Will obtain ECG and troponin to determine MI, electrolytes, CBC for anemia (pt has had this recently 2/2 chemotherapy)  CXR  Will admit pt to follow with cardiology for further evaluation  Amount and/or Complexity of Data Reviewed  Clinical lab tests: ordered and reviewed  Tests in the radiology section of CPT®: ordered and reviewed  Obtain history from someone other than the patient: yes  Discuss the patient with other providers: yes  Independent visualization of images, tracings, or specimens: yes    Patient Progress  Patient progress: improved    CritCare Time    Disposition  Final diagnoses:   Chest pain     Time reflects when diagnosis was documented in both MDM as applicable and the Disposition within this note     Time User Action Codes Description Comment    1/5/2018  9:58 PM Constance GARCIA Add [R07 9] Chest pain       ED Disposition     ED Disposition Condition Comment    Admit  Case was discussed with Puma Bee and the patient's admission status was agreed to be Admission Status: observation status to the service of Dr Misael Garcia   Follow-up Information    None       Patient's Medications   Discharge Prescriptions    No medications on file     No discharge procedures on file      ED Provider  Electronically Signed by           Marjorie Padilla MD  01/05/18 7606

## 2018-01-06 NOTE — PLAN OF CARE
Pt admitted by MD this am  Dr Aiyana Hansen called and stated need to transfer to [place] for high risk pci this afternoon as pt has thrombocytopenia  Discussed with Dr Saul Chase  Cv RR s1 s2    1  Chest pain with elevated troponin with history of cad- possible nstemi type 1- discussed with zheng at [place]  They will accept pt  Pt will continue to npo  Pt will go for pci this afternoon  Depending on outcome pt may need asa/plavix for one month with asa dose twice a week  Will continue asa, lipitor, imdur, metoprolol  No plavix load due to thrombocytopenia  No heparin gtt due to thrombocytopenia  2  HARRIET/CKD3 with history of left nephrectomy- pts baselien is 1 2-1 3  He is currently at baseline  Continue IVF prior to heart cath  Could consider nephrology consult however literature states that IVF are best treatment to avoid contrast nephropathy  3  Leukocytosis due to neulasta    4  Anemia/thrombocytopenia- appreciate oncology recommendations  These are due to chemo consisting of gemzar    5  History of prostate cancer- treated    6  Bladder cancer- on chemo

## 2018-01-06 NOTE — PROGRESS NOTES
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 chemotherapy, anemia, thrombocytopenia, CKD  He was admitted yesterday to [facility] for complaints of substernal chest pain  He went outside to get his mail and he started to complain of chest pain at that time  Usually his chest pains resolved with rest but this chest pain did not so we had to take sublingual nitroglycerin which relieved his pain  He called the [facility] to [facility]  He had another episode of chest pain in the emergency department which resolved with sublingual nitro  He states he has minimal chest pain with exertion now when he tries to go to the bathroom  He does not complain of any chest pain at this moment  He was found to have elevated troponins up to 5 8  Troponin trending down now to 5 2  His creatinine was 1 68 on admission, improved to 1 37 now  His platelets are 37,811  He had a nuclear stress test done in November 2017 which showed severe ischemia in circumflex territory  Last echo in November 2017 showed normal LV systolic function with EF 55-60%, 3-4+, eccentric posteriorly directed mitral regurgitation, Moderate TR  Given his complex history, and high risk PCI intervention, will plan for cardiac catheterization on Monday  Will need to discuss with Oncology prior to that, as patient will likely need long-term dual antiplatelet therapy after PCI intervention, need to discuss with Oncology if that is okay with his current platelet count being 38,000  Will continue with a, high risk of bleeding spirin 81 mg daily and Plavix 75 mg daily for now    Received a dose of aspirin 325 mg and Plavix 300 mg at [facility] today   Will increase his Imdur back to 60 mg daily  Will d/c his Nitro-Patch  He is very slightly volume overloaded, will give a dose of 20 mg IV Lasix and discontinue his IV fluids  Will hold off on heparin drip at this time as troponins trending down and platelets [id number]  He is high risk for bleeding

## 2018-01-06 NOTE — H&P
History and Physical - [place] [facility]    Patient Information: Carolyn Araujo 74 y o  male MRN: [id number]  Unit/Bed#: [facility] 426-01 Encounter: [id number]  Admitting Physician: Kp Franco DO  PCP: Manuel Díaz MD  Date of Admission:  01/06/18    Assessment/Plan:    Hospital Problem List:     Principal Problem:    MI (myocardial infarction)  Active Problems:    Acute kidney insufficiency    Chronic stable angina ([facility] )    Hyperlipidemia    Transitional cell bladder cancer ([facility] )      Plan for the Primary Problem(s):  · Non STEMI  · Patient for cardiac catheterization tentatively scheduled for Monday  · Patient is high risk given thrombocytopenia  · Discussed with Cardiology, for now we will continue with antiplatelet therapy for management cardiac symptoms  Will hold off on heparin drip given thrombocytopenia  We will consult Oncology for further recommendations regarding thrombocytopenia and debbie catheterization management  · ImDur, metoprolol, aspirin, Plavix, Ranexa,  · Lipitor    Plan for Additional Problems:   · Acute renal failure on chronic kidney disease  Patient with history of nephrectomy  · Creatinine improved  · Fluid resuscitation  · Consult Nephrology stage cord tentatively cardiac catheterization Monday  Patient with prior history nephrectomy  · Hyperlipidemia-Lipitor 20 daily  · Thrombocytopenia-likely secondary to chemotherapy  Will ask Oncology to follow given the plan for cardiac catheterization and likely need for antiplatelet therapy chronically  · Transitional cell carcinoma of bladder -oncology bowel as above  Patient is status post chemotherapy approximately 2 days ago  · Hypertension-Norvasc, imdur, metoprolol  · Leukocytosis secondary to Neulasta therapy status post chemotherapy        VTE Prophylaxis: No anticoagulation secondary to thrombocytopenia  / sequential compression device   Code Status:  Level 2  POLST: There is no POLST form on file for this patient (pre-hospital)    Anticipated Length of Stay:  Patient will be admitted on an Inpatient basis with an anticipated length of stay of  greater 2 midnights  Justification for Hospital Stay:  Needs further risk stratification with plans for tentative cardiac catheterization  Patient with known history of nephrectomy and  thrombocytopenia status post chemotherapy  Patient's risk factors will need to be modified debbie --PCI procedure  Total Time for Visit, including Counseling / Coordination of Care: 45 minutes  Greater than 50% of this total time spent on direct patient counseling and coordination of care  Chief Complaint:  Chest pain/myocardial infarction    History of Present Illness:    Hollie Pham is a 74 y o  male who presents with a known history of transitional cell carcinoma status post prior nephrectomy, coronary disease with bypass surgery back in 2004 and left cardiac catheterization performed back in 2015 which revealed a patent grafts and severe obtuse marginal disease who presents to the hospital with reported symptoms of unstable angina at [facility]  The patient as above noted has a history of transitional cell carcinoma in the urinary bladder and currently is receiving chemotherapy at [facility]  He presents to the hospital anemic with associated thrombocytopenia likely secondary to underlying chemotherapy  He did recently receive Neupogen as well  He presents there with a troponin of 5  After discussion with the cardiology service was felt that the patient should be transferred to [facility] for assessment of a high risk cardiac catheterization  Review of Systems:    Review of Systems   HENT: Negative for congestion  Respiratory: Negative for cough, choking, chest tightness and shortness of breath  Cardiovascular: Positive for chest pain  Negative for leg swelling  Genitourinary: Negative for difficulty urinating, dysuria and enuresis  Musculoskeletal: Negative for arthralgias and gait problem  Neurological: Negative for dizziness, light-headedness and headaches  Hematological: Negative for adenopathy  Psychiatric/Behavioral: Negative for agitation  All other systems reviewed and are negative  Past Medical and Surgical History:     Past Medical History:   Diagnosis Date    Anesthesia     "can't urinate after surgery"    Basal cell carcinoma     Coronary artery disease     Essential tremor     of head and hands bilat    Hearing aid worn     bilat    History of kidney stones     History of pneumonia     childhood    History of prostate cancer     Hx of radiation therapy     2007 for after prostate surgery    Hyperlipidemia     Hypertension     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 ([address] )     and" left kidney and left ureter removed"    Urinary incontinence     wears Depends    Wears glasses     Wears partial dentures     lower       Past Surgical History:   Procedure Laterality Date    APPENDECTOMY      BACK SURGERY      lumbar herniated disc repair    CARDIAC CATHETERIZATION      CORONARY ARTERY BYPASS GRAFT      x2 in 2004   [address] [place] [address] [address] 7/3/2017    Procedure: BLADDER BIOPSY;  Surgeon: Aziza Robb MD;  Location: [facility];  Service: Urology    CYSTOSCOPY W/ RETROGRADES Right 7/3/2017    Procedure: CYSTOSCOPY WITH RETROGRADE PYELOGRAM;  Surgeon: Aziza Robb MD;  Location: [facility];  Service: Urology    HERNIA REPAIR      NEPHRECTOMY Left     and left ureter    PROSTATECTOMY      ROTATOR CUFF REPAIR Left     SHOULDER SURGERY      dislocation       Meds/Allergies:    Prior to Admission medications    Medication Sig Start Date End Date Taking?  Authorizing Provider   amLODIPine (NORVASC) 2 5 mg tablet Take 2 5 mg by mouth daily    Historical Provider, MD   aspirin 81 mg chewable tablet Chew 81 mg daily    Historical Provider, MD   atorvastatin (LIPITOR) 20 mg tablet Take 20 mg by mouth daily after dinner    Historical Provider, MD   Cholecalciferol (D3-1000) 1000 units capsule Take 1,000 Units by mouth daily    Historical Provider, MD   Cyanocobalamin (B-12) 1000 MCG CAPS Take by mouth daily    Historical Provider, MD   isosorbide mononitrate (IMDUR) 60 mg 24 hr tablet Take 60 mg by mouth daily    Historical Provider, MD   LORazepam (ATIVAN) 0 5 mg tablet Take by mouth every 8 (eight) hours as needed for anxiety    Historical Provider, MD   metoprolol tartrate (LOPRESSOR) 25 mg tablet Take 25 mg by mouth every 12 (twelve) hours    Historical Provider, MD   Multiple Vitamin (MULTIVITAMIN) tablet Take 1 tablet by mouth daily    Historical Provider, MD   Nitroglycerin (NITROTAB SL) Place under the tongue every 5 (five) minutes as needed "never used"    Historical Provider, MD   ondansetron (ZOFRAN) 8 mg tablet Take by mouth every 8 (eight) hours as needed for nausea or vomiting    Historical Provider, MD   prochlorperazine (COMPAZINE) 10 mg tablet Take 10 mg by mouth every 6 (six) hours as needed for nausea or vomiting    Historical Provider, MD   ranolazine (RANEXA) 500 mg 12 hr tablet Take 500 mg by mouth 2 (two) times a day    Historical Provider, MD     I have reviewed home medications with patient personally  Allergies: Allergies   Allergen Reactions    Levaquin [Levofloxacin] Rash    Percocet [Oxycodone-Acetaminophen] GI Intolerance     Constipation,,,happens with narcotics like percocet       Social History:     Marital Status: /Civil Union   Occupation:  Retired  Patient Pre-hospital Living Situation:  Lives with wife  Patient Pre-hospital Level of Mobility:  Ambulatory  Patient Pre-hospital Diet Restrictions:  Denies  Substance Use History:   History   Alcohol Use    Yes     Comment: RARELY     History   Smoking Status    Never Smoker   Smokeless Tobacco    Never Used     History   Drug Use No       Family History:    No family history on file      Physical Exam: Vitals:   Blood Pressure: 112/63 (01/06/18 1311)  Pulse: 79 (01/06/18 1311)  Temperature: (!) 97 3 °F (36 3 °C) (01/06/18 1311)  Temp Source: Oral (01/06/18 1311)  Respirations: 18 (01/06/18 1311)  Height: 5' 10" (177 8 cm) (01/06/18 1311)  Weight - Scale: 79 8 kg (175 lb 14 8 oz) (01/06/18 1311)  SpO2: 93 % (01/06/18 1311)    Physical Exam   Constitutional: He is oriented to person, place, and time  He appears well-developed and well-nourished  HENT:   Head: Normocephalic and atraumatic  Eyes: Conjunctivae are normal  Pupils are equal, round, and reactive to light  Neck: Normal range of motion  Neck supple  No tracheal deviation present  No thyromegaly present  Cardiovascular: Normal rate and regular rhythm  No murmur heard  Pulmonary/Chest: Effort normal and breath sounds normal  No respiratory distress  He has no wheezes  He has no rales  Abdominal: Soft  Bowel sounds are normal  He exhibits no distension  There is no tenderness  Musculoskeletal: Normal range of motion  He exhibits no edema  Neurological: He is alert and oriented to person, place, and time  Skin: Skin is warm and dry  No rash noted  No erythema  Psychiatric: He has a normal mood and affect  Additional Data:     Lab Results: I have personally reviewed pertinent reports  Results from last 7 days  Lab Units 01/06/18 0417 01/05/18 2042   WBC Thousand/uL 21 56*  --  24 25*   HEMOGLOBIN g/dL 8 9*  --  9 7*   HEMATOCRIT % 28 0*  --  30 8*   PLATELETS Thousands/uL 38*  < > 58*   LYMPHO PCT %  --   --  2*   MONO PCT MAN %  --   --  0*   EOSINO PCT MANUAL %  --   --  1   < > = values in this interval not displayed      Results from last 7 days  Lab Units 01/06/18 0417 01/05/18 2042   SODIUM mmol/L 139 139   POTASSIUM mmol/L 4 7 4 8   CHLORIDE mmol/L 108 104   CO2 mmol/L 22 27   BUN mg/dL 22 26*   CREATININE mg/dL 1 37* 1 68*   CALCIUM mg/dL 8 2* 8 9   TOTAL PROTEIN g/dL  --  7 4   BILIRUBIN TOTAL mg/dL  --  0 55 ALK PHOS U/L  --  92   ALT U/L  --  114*   AST U/L  --  60*   GLUCOSE RANDOM mg/dL 114 213*       Results from last 7 days  Lab Units 01/06/18  0042   INR  1 12       Imaging: I have personally reviewed pertinent reports  X-ray Chest 2 Views    Result Date: 1/6/2018  Narrative: CHEST - DUAL ENERGY INDICATION: 10-year-old male, chest pain COMPARISON: None VIEWS:  PA (including soft tissue/bone algorithms) and lateral projections; 4 images FINDINGS: Mild fairly diffuse vascular congestion The lungs are otherwise clear  No pleural effusions  The cardiomediastinal silhouette is moderately enlarged Bony thorax is unremarkable  Left subclavian [id number], terminating at superior right atrium  No pneumothorax  Sternal wire sutures consistent with CABG     Impression: Interval development of mild vascular congestion Persistent cardiomegaly status post CABG Workstation performed: [id number]         [employer] / [employer] Records Reviewed: Yes     ** Please Note: This note has been constructed using a voice recognition system   **

## 2018-01-06 NOTE — ED NOTES
Pt repositioned in bed, HOB lowered and provided pillow  Bed in low and locked position and call bell within reach        Jose Georges RN  01/05/18 9527

## 2018-01-06 NOTE — PLAN OF CARE
Problem: PAIN - ADULT  Goal: Verbalizes/displays adequate comfort level or baseline comfort level  Interventions:  - Encourage patient to monitor pain and request assistance  - Assess pain using appropriate pain scale  - Administer analgesics based on type and severity of pain and evaluate response  - Implement non-pharmacological measures as appropriate and evaluate response  - Consider cultural and social influences on pain and pain management  - Notify physician/advanced practitioner if interventions unsuccessful or patient reports new pain   Outcome: Progressing      Problem: INFECTION - ADULT  Goal: Absence or prevention of progression during hospitalization  INTERVENTIONS:  - Assess and monitor for signs and symptoms of infection  - Monitor lab/diagnostic results  - Monitor all insertion sites, i e  indwelling lines, tubes, and drains  - Administer medications as ordered  - Instruct and encourage patient and family to use good hand hygiene technique  - Identify and instruct in appropriate isolation precautions for identified infection/condition   Outcome: Progressing      Problem: SAFETY ADULT  Goal: Patient will remain free of falls  INTERVENTIONS:  - Assess patient frequently for physical needs  -  Identify cognitive and physical deficits and behaviors that affect risk of falls  -  [place] fall precautions as indicated by assessment   - Educate patient/family on patient safety including physical limitations  - Instruct patient to call for assistance with activity based on assessment  - Modify environment to reduce risk of injury  - Consider OT/PT consult to assist with strengthening/mobility   Outcome: Progressing      Problem: Knowledge Deficit  Goal: Patient/family/caregiver demonstrates understanding of disease process, treatment plan, medications, and discharge instructions  Complete learning assessment and assess knowledge base    Interventions:  - Provide teaching at level of understanding  - Provide teaching via preferred learning methods   Outcome: Progressing      Problem: CARDIOVASCULAR - ADULT  Goal: Maintains optimal cardiac output and hemodynamic stability  INTERVENTIONS:  - Monitor I/O, vital signs and rhythm  - Monitor for S/S and trends of decreased cardiac output i e  bleeding, hypotension  - Administer and titrate ordered vasoactive medications to optimize hemodynamic stability  - Assess quality of pulses, skin color and temperature  - Assess for signs of decreased coronary artery perfusion - ex   Angina  - Instruct patient to report change in severity of symptoms  Outcome: Progressing    Goal: Absence of cardiac dysrhythmias or at baseline rhythm  INTERVENTIONS:  - Continuous cardiac monitoring, monitor vital signs, obtain 12 lead EKG if indicated  - Administer antiarrhythmic and heart rate control medications as ordered  - Monitor electrolytes and administer replacement therapy as ordered  Outcome: Progressing      Problem: GENITOURINARY - ADULT  Goal: Maintains or returns to baseline urinary function  INTERVENTIONS:  - Assess urinary function  - Encourage oral fluids to ensure adequate hydration  - Administer IV fluids as ordered to ensure adequate hydration  - Administer ordered medications as needed  - Offer frequent toileting  - Follow urinary retention protocol if ordered  Outcome: Progressing    Goal: Absence of urinary retention  INTERVENTIONS:  - Assess patients ability to void and empty bladder  - Monitor I/O  - Bladder scan as needed  - Discuss with physician/AP medications to alleviate retention as needed  - Discuss catheterization for long term situations as appropriate  Outcome: Progressing      Problem: SKIN/TISSUE INTEGRITY - ADULT  Goal: Skin integrity remains intact  INTERVENTIONS  - Identify patients at risk for skin breakdown  - Assess and monitor skin integrity  - Assess and monitor nutrition and hydration status  - Monitor labs (i e  albumin)  - Assess for incontinence   - Turn and reposition patient  - Assist with mobility/ambulation  - Relieve pressure over bony prominences  - Avoid friction and shearing  - Provide appropriate hygiene as needed including keeping skin clean and dry  - Evaluate need for skin moisturizer/barrier cream  - Collaborate with interdisciplinary team (i e  Nutrition, Rehabilitation, etc )   - Patient/family teaching  Outcome: Progressing    Goal: Incision(s), wounds(s) or drain site(s) healing without S/S of infection  INTERVENTIONS  - Assess and document risk factors for skin impairment   - Assess and document dressing, incision, wound bed, drain sites and surrounding tissue  - Initiate Nutrition services consult and/or wound management as needed  Outcome: Progressing    Goal: Oral mucous membranes remain intact  INTERVENTIONS  - Assess oral mucosa and hygiene practices  - Implement preventative oral hygiene regimen  - Implement oral medicated treatments as ordered  - Initiate Nutrition services referral as needed  Outcome: Progressing

## 2018-01-06 NOTE — PLAN OF CARE
INFECTION - ADULT     Absence or prevention of progression during hospitalization Progressing        Knowledge Deficit     Patient/family/caregiver demonstrates understanding of disease process, treatment plan, medications, and discharge instructions Progressing        PAIN - ADULT     Verbalizes/displays adequate comfort level or baseline comfort level Progressing        SAFETY ADULT     Patient will remain free of falls Progressing

## 2018-01-06 NOTE — DISCHARGE SUMMARY
Discharge Summary - [facility] Internal Medicine    Patient Information: Jasen Matthews 74 y o  male MRN: [id number]  Unit/Bed#: E4 -01 Encounter: [id number]    Discharging Physician / Practitioner: Calixto Ruiz DO  PCP: Jakub Garza MD  Admission Date: 1/5/2018  Discharge Date: 01/06/18    Reason for Admission: chest pain    Pt was transferred to [facility]  Discharge Diagnoses:     Principal Problem:    Chest pain  Active Problems:    Leukocytosis    Acute kidney insufficiency    Dyspnea  Resolved Problems:    * No resolved hospital problems  *      Consultations During Hospital Stay:  · Cardiology, [facility]  · Oncology     Procedures Performed:     · none    Significant Findings / Test Results:   X-ray Chest 2 Views  Result Date: 1/6/2018  Impression: Interval development of mild vascular congestion Persistent cardiomegaly status post CABG Workstation performed: [id number]       Test Results Pending at Discharge (will require follow up): · None    Hospital Course:     Jasen Matthews is a 74 y o  male patient who originally presented to the hospital on 1/5/2018 due to Chest pain with elevated troponin with history of cad possible nstemi type 1  discussed with zheng at [place]  They will accept pt  Pt will continue to be npo  Pt will go for pci this afternoon  Depending on outcome pt may need asa/plavix for one month with asa dose twice a week  Will continue asa, lipitor, imdur, metoprolol  No plavix load due to thrombocytopenia  No heparin gtt due to thrombocytopenia  Also will hold heparin sq due to thrombocytopenia     For his HARRIET/CKD3 with history of left nephrectomy in which pts baseline is 1 2-1 3  He creatinine was 1 6 on admission  It is currently at baseline of 1 3  Continue IVF prior to heart cath   Could consider nephrology consult however literature states that IVF are best treatment to avoid contrast nephropathy      His Leukocytosis is due to neulasta    Pt has Anemia/thrombocytopenia  He was evaluated by oncology  These are due to chemo consisting of gemzar  Oncology stated that Gemzar could also be contributing to his chest pain  However pt does have significantly elevated troponins      He has a History of prostate cancer which was treated    He currently follows at [facility] with oncology for his Bladder cancer  He is currently on chemo  He will need to follow up with them as outpt  Pt was transferred to [place] for a high risk pci      Discharge Day Visit / Exam:     * Please refer to separate progress note for these details *    Discharge instructions/Information to patient and family:   See after visit summary for information provided to patient and family  Provisions for Follow-Up Care:  See after visit summary for information related to follow-up care and any pertinent home health orders  Discharge Medications:  See after visit summary for reconciled discharge medications provided to patient and family

## 2018-01-06 NOTE — CONSULTS
Consultation - Nephrology   Clay Hansen 74 y o  male MRN: [id number]  Unit/Bed#: [facility] 426-01 Encounter: [id number]      Assessment/Plan:    1  Acute renal failure, likely secondary to cardiorenal syndrome, peak creatinine of 1 6  2  Chronic kidney disease, baseline creatinine between 1 3 and 1 4  3  Solitary kidney function, status post nephrectomy  4  History of bladder cancer, currently receiving chemotherapy  5  Leukocytosis secondary to Neulasta  6  Thrombocytopenia, now attributed to Gemzar/chemotherapy  7  Non ST elevation MI, anticipated cardiac catheterization  8  History of coronary disease      · Overall his renal function appears fairly stable, creatinine again baseline between 1 31 4  · Volume status appears improved, recently given IV Lasix  · Would give Lasix as needed, again volume status appears improved  · Discussed risks for cardiac catheterization including IV dye , would recommend IV saline as well as Mucomyst prior to cardiac catheterization  · Thrombocytopenia secondary to chemotherapy        History of Present Illness   Physician Requesting Consult: Huan Patel DO  Reason for Consult / Principal Problem:  Acute kidney injury  HPI: Clay Hansen is a 74y o  year old male who presents with chest pain  Patient is a 49-year-old male with a complex past medical history including 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, with ongoing chemotherapy as well as coronary disease with CABG in 2004  He also appears to have chronic kidney disease with baseline creatinine between 1 3 and 1 4  Patient initially presented to [facility] with complaints of chest pain  HIS pains were relieved with rest however his activities were markedly limited  His pain is also relieved with nitroglycerin  On admission he was found have an elevated troponin of 5 8  He now has been transferred to [address] [place] [address] for possible cardiac catheterization      On presentation to [address] he was found to have a creatinine of 1 68  Currently his creatinine is improved to 1 37  Additionally he was complained of significant shortness of breath but that has seems to have improved after IV Lasix as well as nitroglycerin  Currently he is chest pain-free  Again no significant shortness of breath at rest   No abdominal pain does significant lower extremity swelling  Recently he is on a chemotherapy break      History obtained from chart review and the patient    Review of Systems: pertinent findings as noted above otherwise negative      Historical Information   Patient Active Problem List   Diagnosis    Chest pain    Leukocytosis    Chronic anemia    Acute kidney insufficiency    Chronic stable angina (HCC)    Dyspnea    CAD (coronary artery disease)    Hyperlipidemia    RBBB    Transitional cell bladder [facility] ([facility] )    MI (myocardial infarction)     Past Medical History:   Diagnosis Date    Anesthesia     "can't urinate after surgery"    Basal cell carcinoma     Coronary artery disease     Essential tremor     of head and hands bilat    Hearing aid worn     bilat    History of kidney stones     History of pneumonia     childhood    History of prostate cancer     Hx of radiation therapy     2007 for after prostate surgery    Hyperlipidemia     Hypertension     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 ([facility] [facility] )     and" left kidney and left ureter removed"    Urinary incontinence     wears Depends    Wears glasses     Wears partial dentures     lower     Past Surgical History:   Procedure Laterality Date    APPENDECTOMY      BACK SURGERY      lumbar herniated disc repair    CARDIAC CATHETERIZATION      CORONARY ARTERY BYPASS GRAFT      x2 in 2004    CYSTOSCOPY      CYSTOSCOPY N/A 7/3/2017    Procedure: BLADDER BIOPSY;  Surgeon: Nixon Meza MD;  Location: [facility];  Service: Urology    CYSTOSCOPY W/ RETROGRADES Right 7/3/2017 Procedure: CYSTOSCOPY WITH RETROGRADE PYELOGRAM;  Surgeon: Jessica Junior MD;  Location[facility];  Service: Urology    HERNIA REPAIR      NEPHRECTOMY Left     and left ureter    PROSTATECTOMY      ROTATOR CUFF REPAIR Left     SHOULDER SURGERY      dislocation     Social History   History   Alcohol Use    Yes     Comment: RARELY     History   Drug Use No     History   Smoking Status    Never Smoker   Smokeless Tobacco    Never Used     No family history on file      Meds/Allergies   current meds:   Current Facility-Administered Medications   Medication Dose Route Frequency    [START ON 1/7/2018] amLODIPine (NORVASC) tablet 2 5 mg  2 5 mg Oral Daily    [START ON 1/7/2018] aspirin chewable tablet 81 mg  81 mg Oral Daily    atorvastatin (LIPITOR) tablet 40 mg  40 mg Oral QPM    [START ON 1/7/2018] cholecalciferol (VITAMIN D3) tablet 1,000 Units  1,000 Units Oral Daily    [START ON 1/7/2018] clopidogrel (PLAVIX) tablet 75 mg  75 mg Oral Daily    [START ON 1/7/2018] cyanocobalamin (VITAMIN B-12) tablet 1,000 mcg  1,000 mcg Oral Daily    [START ON 1/7/2018] isosorbide mononitrate (IMDUR) 24 hr tablet 60 mg  60 mg Oral Daily    LORazepam (ATIVAN) tablet 0 5 mg  0 5 mg Oral Q8H PRN    metoprolol tartrate (LOPRESSOR) tablet 25 mg  25 mg Oral Q8H    morphine injection 1 mg  1 mg Intravenous Q4H PRN    [START ON 1/7/2018] multivitamin-minerals (CENTRUM) tablet 1 tablet  1 tablet Oral Daily    nitroglycerin (NITROSTAT) SL tablet 0 4 mg  0 4 mg Sublingual Q5 Min PRN    ondansetron (ZOFRAN) injection 4 mg  4 mg Intravenous Q6H PRN    ranolazine (RANEXA) 12 hr tablet 500 mg  500 mg Oral BID    senna (SENOKOT) tablet 8 6 mg  1 tablet Oral HS PRN       Allergies   Allergen Reactions    Levaquin [Levofloxacin] Rash    Percocet [Oxycodone-Acetaminophen] GI Intolerance     Constipation,,,happens with narcotics like percocet         Objective   /59 Comment: Map85  Pulse 82   Temp 97 9 °F (36 6 °C) (Oral) Resp 17   Ht 5' 10" (1 778 m)   Wt 79 8 kg (175 lb 14 8 oz)   SpO2 95%   BMI 25 24 kg/m²   No intake or output data in the 24 hours ending 01/06/18 1720    Current Weight: Weight - Scale: 79 8 kg (175 lb 14 8 oz)    PHYSICAL EXAM:  General:  Apparent distress, appears comfortable  HENT:  Mucosa moist  Eyes:  No scleral icterus  Neck:  Supple  Chest:  Very few rales noted at the bases  CVS:  Regular  Abdomen:  Soft  Extremities:  No significant edema  Skin:  No rash  Neuro:  Appears nonfocal  Psych:  Appropriate      Lab Results:      Results from last 7 days  Lab Units 01/06/18  0417   WBC Thousand/uL 21 56*   HEMOGLOBIN g/dL 8 9*   HEMATOCRIT % 28 0*   PLATELETS Thousands/uL 38*       Results from last 7 days  Lab Units 01/06/18  0417   SODIUM mmol/L 139   POTASSIUM mmol/L 4 7   CHLORIDE mmol/L 108   CO2 mmol/L 22   BUN mg/dL 22   CREATININE mg/dL 1 37*   GLUCOSE RANDOM mg/dL 114   CALCIUM mg/dL 8 2*

## 2018-01-06 NOTE — CONSULTS
Cardiology Consult  01/06/18    Referring Physian: Vinh Jaimes, DO   [facility]    Chief Complain/Reason for Referal:     IMPRESSION:  1  Type 1 non ST-elevation myocardial infarction with recurrent breakthrough angina  2  Severe underlying CAD status post CABG in 2004, known severe left circumflex disease with significant lateral wall ischemia noted 11/2017  3  Hypertension  4  Dyslipidemia      DISCUSSION/RECOMMENDATIONS:  · Had long discussion with patient regarding options for treatment, medical management versus cardiac catheterization  As medical management has been attempted since November, and he has been having recurrent anginal symptoms, now with unstable angina and elevated troponin, cardiac catheterization would be preferred  He has significant number of comorbidities including thrombocytopenia, anemia, chronic kidney disease which does complicate the case  The patient is amenable to cardiac catheterization and possible high risk PCI after indications, risks, and benefits were thoroughly reviewed with him  After discussion with Dr GRAVES PSIQUIATRICDARIA FLORES over the phone, it was decided to tx the pt to [facility] for cath and possible high risk PCI  · Requested [facility] to tx to [facility] service at [facility]  Con't DAPT (s/p plavix load), lopressor, NTPaste, imdur  Lopressor increased to TID, will continue adjusting  · No s/s of CHF although come vascular congestion on CXR reported  BNP pending, with no increase in weight/edema or SOB  Will con't gentle IVF precath but cautiously  · D/W Dr Keyur Dias over phone, [place]    ======================================================    HPI:  I am seeing this patient in cardiology consultation for:  Chest pain    Clay Hansen is a 74 y o  Male with a significant history of renal cell carcinoma, chronic kidney disease, CAD status post CABG in 2000, and prior cardiac catheterization in 2015 revealing patent grafts, and severe left circumflex disease    This did not seem amenable to PCI     On the background, he was admitted at [facility] in November 2017 with chest pain, which sounds like typical angina  It does not seem like he had spilled troponins, and aggressive medical management was recommended  He was admitted once again in December 2017, without troponin elevation, and recurrent chest pain, at which time medical management was amplified with isosorbide, Ranexa, aspirin, metoprolol, sublingual nitroglycerin  He did have a nuclear stress test in November 2017, revealing significant reversible ischemia in the mid and basal lateral myocardial wall segments with ejection fraction 57%  Echocardiogram at that same time revealed a normal ejection fraction  On this background, he has been following Dr Stephany Johnston in our office  He has been having symptoms of stable angina with exertional chest pain  Yesterday, around 6:00 p m  he went out to his mailbox, and started getting chest tightness, which was significantly alleviated but did not resolve completely with 2 sublingual nitroglycerin  He had recurrent chest pain while eating dinner later and took another sublingual nitroglycerin with relief but not complete resolution  He had recurrent pain later in the evening, which prompted him to come to the emergency department  ECG revealed bifascicular block with nonspecific ST segment abnormality  At this time he is completely chest pain-free, but did have pain early in the morning with ambulation to the bathroom, and intermittent pain through the night  He denies significant weight gain, orthopnea, increased edema, or worsening shortness of breath          Past Medical History:   Diagnosis Date    Anesthesia     "can't urinate after surgery"    Basal cell carcinoma     Coronary artery disease     Essential tremor     of head and hands bilat    Hearing aid worn     bilat    History of kidney stones     History of pneumonia     childhood    History of prostate cancer  Hx of radiation therapy     2007 for after prostate surgery    Hyperlipidemia     Hypertension     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)     and" left kidney and left ureter removed"    Urinary incontinence     wears Depends    Wears glasses     Wears partial dentures     lower         Scheduled Meds:  amLODIPine 2 5 mg Oral Daily   aspirin 81 mg Oral Daily   [START ON 1/7/2018] aspirin 81 mg Oral Daily   atorvastatin 40 mg Oral QPM   cholecalciferol 1,000 Units Oral Daily   clopidogrel 300 mg Oral Daily   cyanocobalamin 1,000 mcg Oral Daily   heparin (porcine) 5,000 Units Subcutaneous Q8H [address]   isosorbide mononitrate 30 mg Oral Daily   metoprolol tartrate 25 mg Oral Q8H   multivitamin-minerals 1 tablet Oral Daily   ranolazine 500 mg Oral BID     Continuous Infusions:  sodium chloride 75 mL/hr     PRN Meds:  LORazepam    morphine injection    nitroglycerin    ondansetron    senna  Allergies   Allergen Reactions    Levaquin [Levofloxacin] Rash    Percocet [Oxycodone-Acetaminophen] GI Intolerance     Constipation,,,happens with narcotics like percocet     I reviewed the Home Medication list in the chart  History reviewed  No pertinent family history  Social History     Social History    Marital status: /Civil Union     Spouse name: N/A    Number of children: N/A    Years of education: N/A     Occupational History    Not on file       Social History Main Topics    Smoking status: Never Smoker    Smokeless tobacco: Never Used    Alcohol use Yes      Comment: RARELY    Drug use: No    Sexual activity: Not on file     Other Topics Concern    Not on file     Social History Narrative    No narrative on file       Review of Systems - as per HPI, all others reviewed and negative  Vitals:    01/06/18 0755   BP: 156/76   Pulse: 90   Resp: 18   Temp: 99 8 °F (37 7 °C)   SpO2: 92%     I/O       01/04 0701 - 01/05 0700 01/05 0701 - 01/06 0700 01/06 0701 - 01/07 0700    IV Piggyback  1000     Total Intake(mL/kg)  1000 (13)     Net   +1000                 Weight (last 2 days)     Date/Time   Weight    01/05/18 2039  77 1 (170)              GEN: NAD, Alert  HEENT: Mucus membranes moist, pink conjunctivae  EYES: Pupils equal, sclera anicteric  NECK: No JVD/HJR, no carotid bruit  CARDIOVASCULAR: RRR, No murmur, rub, gallops S1,S2, no parasternal heave/thrill  LUNGS: Clear To auscultation bilaterally  ABDOMEN: Soft, nondistended, no hepatic systolic pulsation  EXTREMITIES/VASCULAR: No edema    PSYCH: Normal Affect by limited examination  NEURO: Grossly intact by limited examination    HEME: No significant bleeding, bruising, petechia by limited examination  SKIN: No significant rashes by limited examination  MSK:  Normal upper extremity and trunk strengths by limited examination      EKG:  Sinus rhythm, bifascicular block  TELE:  Sinus rhythm, no events  CXR:  Very mild interstitial congestion  PRIOR STRESS TEST:  November 2017 at [facility], reviewed, significant lateral ischemia was noted    ECHO:  November 2017, normal ejection fraction       Results from last 7 days  Lab Units 01/06/18  0417 01/06/18  0042 01/05/18 2042   WBC Thousand/uL 21 56*  --  24 25*   HEMOGLOBIN g/dL 8 9*  --  9 7*   HEMATOCRIT % 28 0*  --  30 8*   PLATELETS Thousands/uL 38* 42* 58*   MONO PCT MAN %  --   --  0*       Results from last 7 days  Lab Units 01/06/18  0417 01/05/18  2042   SODIUM mmol/L 139 139   POTASSIUM mmol/L 4 7 4 8   CHLORIDE mmol/L 108 104   CO2 mmol/L 22 27   BUN mg/dL 22 26*   CREATININE mg/dL 1 37* 1 68*   GLUCOSE RANDOM mg/dL 114 213*   CALCIUM mg/dL 8 2* 8 9       Results from last 7 days  Lab Units 01/06/18  0417 01/05/18  2042   SODIUM mmol/L 139 139   POTASSIUM mmol/L 4 7 4 8   CHLORIDE mmol/L 108 104   CO2 mmol/L 22 27   BUN mg/dL 22 26*   CREATININE mg/dL 1 37* 1 68*   CALCIUM mg/dL 8 2* 8 9   TOTAL PROTEIN g/dL  --  7 4   BILIRUBIN TOTAL mg/dL  --  0 55   ALK PHOS U/L  --  92 ALT U/L  --  114*   AST U/L  --  60*   GLUCOSE RANDOM mg/dL 114 213*     No results found for: TROPONINT    Results from last 7 days  Lab Units 01/06/18  0417   CHOLESTEROL mg/dL 103       Results from last 7 days  Lab Units 01/06/18  0700   TROPONIN I ng/mL 5 20*           Results from last 7 days  Lab Units 01/06/18  0042   INR  1 12               I have personally reviewed the EKG, CXR and Telemetry images directly  Patient Active Problem List    Diagnosis Date Noted    Chest pain 01/05/2018    Leukocytosis 01/05/2018    Chronic anemia 01/05/2018    Acute kidney insufficiency 01/05/2018    [facility] 01/05/2018    Dyspnea 01/05/2018    CAD (coronary artery disease) 01/05/2018    Hyperlipidemia 01/05/2018    RBBB 01/05/2018    Transitional cell bladder cancer ([facility] ) 01/05/2018       Portions of the record may have been created with voice recognition software  Occasional wrong word or "sound a like" substitutions may have occurred due to the inherent limitations of voice recognition software  Read the chart carefully and recognize, using context, where substitutions have occurred

## 2018-01-06 NOTE — CONSULTS
Consultation - Medical Oncology   Noni Barker 74 y o  male MRN: [id number]  Unit/Bed#: E4 -01 Encounter: [id number]      Assessment/Plan     Assessment: Thrombocytopenia likely secondary to chemotherapy especially Gemzar  Anemia secondary to cancer and chemotherapy  Leukocytosis secondary to Neulasta  Previous history of prostate cancer and he had surgery and radiation and that was more than 10 years ago  Last year left nephrectomy for kidney cancer, kidney or ureter? And same time he was found to have cancer in the urinary bladder and he has been getting chemotherapy  Chest pain cardiac versus Neulasta  History of coronary artery disease and previous history of CABG and coronary arteries could not be stented according to the patient  Plan:  Monitoring of patient's blood counts and condition  Transfusions with leuko reduced blood and platelets as needed clinically  Oncology follow up with Dr Sary Adams at [facility]       History of Present Illness :  Patient is here because of chest pain  Physician Requesting Consult: Oma Muñoz DO  Reason for Consult / Principal Problem: Thrombocytopenia  HPI: Noni Barker is a 74y o  year old male who presents with chest pain  Consultation is for thrombocytopenia    Consults:  Patient is 74 year of age  More than 10 years ago he had prostatectomy and radiation for cancer of the prostate  5 years ago he started to have hematuria and that was thought to be secondary to radiation cystitis  In February 2017 he was diagnosed to have left kidney/pelvis cancer and cancer urinary bladder  He had left nephrectomy  He has been getting chemotherapy and 1 of the drug is Gemzar  He had chemotherapy on 1/3/18  He had Neulasta on 01/04/2018  He developed chest pain at rest on 01/05/2018  He is feeling better now  Platelet count has dropped  He came in with low platelet count to begin with  No bleeding  Has weakness and tiredness  History of exertional dyspnea and exertional angina     Review of Systems:  Presently no headaches, seizures, diplopia, dysphagia, hoarseness, , palpitations,  cough, hemoptysis, abdominal pain, melena, or hematuria  No fever, chills, bleeding,, skin rash, weight loss, nausea, vomiting and no change in bowel habits  Appetite is fair  No night sweats  Patient has minor arthritic symptoms  No leg cramps  No swelling of the ankles  No swollen glands  Anxious  Not unusually sensitive to heat or cold  No recent or frequent infections  Reviewed 13 systems  Other symptoms as in HPI      Historical Information   Past Medical History:   Diagnosis Date    Anesthesia     "can't urinate after surgery"    Basal cell carcinoma     Coronary artery disease     Essential tremor     of head and hands bilat    Hearing aid worn     bilat    History of kidney stones     History of pneumonia     childhood    History of prostate cancer     Hx of radiation therapy     2007 for after prostate surgery    Hyperlipidemia     Hypertension     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)     and" left kidney and left ureter removed"    Urinary incontinence     wears Depends    Wears glasses     Wears partial dentures     lower     Past Surgical History:   Procedure Laterality Date    APPENDECTOMY      BACK SURGERY      lumbar herniated disc repair    CARDIAC CATHETERIZATION      CORONARY ARTERY BYPASS GRAFT      x2 in 2004   [address] [place] [address] [address] 7/3/2017    Procedure: BLADDER BIOPSY;  Surgeon: Jose Bucio MD;  Location: [facility];  Service: Urology    CYSTOSCOPY W/ RETROGRADES Right 7/3/2017    Procedure: CYSTOSCOPY WITH RETROGRADE PYELOGRAM;  Surgeon: Jose Bucio MD;  Location[facility];  Service: Urology    HERNIA REPAIR      NEPHRECTOMY Left     and left ureter    PROSTATECTOMY      ROTATOR CUFF REPAIR Left     SHOULDER SURGERY      dislocation     Social History   History   Alcohol Use    Yes     Comment: RARELY     History   Drug Use No     History   Smoking Status    Never Smoker   Smokeless Tobacco    Never Used     Family History: History reviewed  No pertinent family history      Meds/Allergies   current meds:   Current Facility-Administered Medications   Medication Dose Route Frequency    amLODIPine (NORVASC) tablet 2 5 mg  2 5 mg Oral Daily    aspirin chewable tablet 81 mg  81 mg Oral Daily    [START ON 1/7/2018] aspirin chewable tablet 81 mg  81 mg Oral Daily    atorvastatin (LIPITOR) tablet 40 mg  40 mg Oral QPM    cholecalciferol (VITAMIN D3) tablet 1,000 Units  1,000 Units Oral Daily    clopidogrel (PLAVIX) tablet 300 mg  300 mg Oral Daily    cyanocobalamin (VITAMIN B-12) tablet 1,000 mcg  1,000 mcg Oral Daily    heparin (porcine) subcutaneous injection 5,000 Units  5,000 Units Subcutaneous Q8H [address]    isosorbide mononitrate (IMDUR) 24 hr tablet 30 mg  30 mg Oral Daily    LORazepam (ATIVAN) tablet 0 5 mg  0 5 mg Oral Q8H PRN    metoprolol tartrate (LOPRESSOR) tablet 25 mg  25 mg Oral Q8H    morphine injection 1 mg  1 mg Intravenous Q4H PRN    multivitamin-minerals (CENTRUM) tablet 1 tablet  1 tablet Oral Daily    nitroglycerin (NITROSTAT) SL tablet 0 4 mg  0 4 mg Sublingual Q5 Min PRN    ondansetron (ZOFRAN) injection 4 mg  4 mg Intravenous Q6H PRN    ranolazine (RANEXA) 12 hr tablet 500 mg  500 mg Oral BID    senna (SENOKOT) tablet 8 6 mg  1 tablet Oral HS PRN    sodium chloride 0 9 % infusion  75 mL/hr Intravenous Continuous    and PTA meds:  Prescriptions Prior to Admission   Medication    amLODIPine (NORVASC) 2 5 mg tablet    aspirin 81 mg chewable tablet    atorvastatin (LIPITOR) 20 mg tablet    Cholecalciferol (D3-1000) 1000 units capsule    Cyanocobalamin (B-12) 1000 MCG CAPS    isosorbide mononitrate (IMDUR) 60 mg 24 hr tablet    LORazepam (ATIVAN) 0 5 mg tablet    metoprolol tartrate (LOPRESSOR) 25 mg tablet    Multiple Vitamin (MULTIVITAMIN) tablet    Nitroglycerin (NITROTAB SL)    ranolazine (RANEXA) 500 mg 12 hr tablet    ondansetron (ZOFRAN) 8 mg tablet    prochlorperazine (COMPAZINE) 10 mg tablet     Allergies   Allergen Reactions    Levaquin [Levofloxacin] Rash    Percocet [Oxycodone-Acetaminophen] GI Intolerance     Constipation,,,happens with narcotics like percocet       Objective   Vitals: Blood pressure 156/76, pulse 90, temperature 99 8 °F (37 7 °C), temperature source Temporal, resp  rate 18, height 5' 10" (1 778 m), weight 77 1 kg (170 lb), SpO2 92 %  Intake/Output Summary (Last 24 hours) at 01/06/18 0949  Last data filed at 01/05/18 2300   Gross per 24 hour   Intake             1000 ml   Output                0 ml   Net             1000 ml     Invasive Devices     Peripheral Intravenous Line            Peripheral IV 01/05/18 Left Antecubital less than 1 day          Drain            Urethral Catheter Latex 20 Fr  187 days                Physical Exam:  Patient is alert and oriented and not in any acute distress  No icterus  No oral thrush  No palpable neck mass  Lung fields are clear to percussion and auscultation  Heart rate is regular  Abdomen soft and nontender  No palpable abdominal mass  No ascites  No edema of ankles  No calf tenderness  No focal neurological deficit  No skin rash  No palpable lymphadenopathy  Good arterial pulses  No clubbing  Anxious  Performance status one  Lab Results: I have reviewed all pertinent labs  Imaging Studies: I have personally reviewed pertinent reports  EKG, Pathology, and Other Studies:  Not applicable  VTE Prophylaxis:  Per primary physician and cardiologist    Code Status: Level 2 - DNAR: but accepts endotracheal intubation  Advance Directive and Living Will:      Power of :    POLST:      Counseling / Coordination of Care  Total floor / unit time spent today 50 minutes   Greater than 50% of total time was spent with the patient and / or family counseling and / or coordination of care  A description of the counseling / coordination of care:  Discussed patient's disease status, previous cancers, present chemotherapy, thrombocytopenia  most likely 2nd to chemotherapy and leukocytosis most likely secondary to Neulasta  Anemia secondary to chemotherapy  Chest pain  Coronary artery disease and cardiologist is on board to decide the next step    Outpatient follow-up with patient's primary oncologist Dr Sary Adams at [facility]

## 2018-01-07 LAB
ANION GAP SERPL CALCULATED.3IONS-SCNC: 7 MMOL/L (ref 4–13)
ANISOCYTOSIS BLD QL SMEAR: PRESENT
BASOPHILS # BLD MANUAL: 0 THOUSAND/UL (ref 0–0.1)
BASOPHILS NFR MAR MANUAL: 0 % (ref 0–1)
BUN SERPL-MCNC: 18 MG/DL (ref 5–25)
CALCIUM SERPL-MCNC: 8.5 MG/DL (ref 8.3–10.1)
CHLORIDE SERPL-SCNC: 105 MMOL/L (ref 100–108)
CO2 SERPL-SCNC: 26 MMOL/L (ref 21–32)
CREAT SERPL-MCNC: 1.24 MG/DL (ref 0.6–1.3)
EOSINOPHIL # BLD MANUAL: 0.46 THOUSAND/UL (ref 0–0.4)
EOSINOPHIL NFR BLD MANUAL: 2 % (ref 0–6)
ERYTHROCYTE [DISTWIDTH] IN BLOOD BY AUTOMATED COUNT: 20.8 % (ref 11.6–15.1)
GFR SERPL CREATININE-BSD FRML MDRD: 57 ML/MIN/1.73SQ M
GLUCOSE SERPL-MCNC: 101 MG/DL (ref 65–140)
HAPTOGLOB SERPL-MCNC: 213 MG/DL (ref 34–200)
HCT VFR BLD AUTO: 26.2 % (ref 36.5–49.3)
HGB BLD-MCNC: 8.6 G/DL (ref 12–17)
LYMPHOCYTES # BLD AUTO: 0.46 THOUSAND/UL (ref 0.6–4.47)
LYMPHOCYTES # BLD AUTO: 2 % (ref 14–44)
MACROCYTES BLD QL AUTO: PRESENT
MCH RBC QN AUTO: 34 PG (ref 26.8–34.3)
MCHC RBC AUTO-ENTMCNC: 32.8 G/DL (ref 31.4–37.4)
MCV RBC AUTO: 104 FL (ref 82–98)
MONOCYTES # BLD AUTO: 0.23 THOUSAND/UL (ref 0–1.22)
MONOCYTES NFR BLD: 1 % (ref 4–12)
NEUTROPHILS # BLD MANUAL: 21.85 THOUSAND/UL (ref 1.85–7.62)
NEUTS BAND NFR BLD MANUAL: 2 % (ref 0–8)
NEUTS SEG NFR BLD AUTO: 93 % (ref 43–75)
NRBC BLD AUTO-RTO: 0 /100 WBCS
PLATELET # BLD AUTO: 41 THOUSANDS/UL (ref 149–390)
PLATELET BLD QL SMEAR: ABNORMAL
PMV BLD AUTO: 11 FL (ref 8.9–12.7)
POTASSIUM SERPL-SCNC: 4.7 MMOL/L (ref 3.5–5.3)
RBC # BLD AUTO: 2.53 MILLION/UL (ref 3.88–5.62)
RBC MORPH BLD: PRESENT
SMUDGE CELLS BLD QL SMEAR: PRESENT
SODIUM SERPL-SCNC: 138 MMOL/L (ref 136–145)
WBC # BLD AUTO: 23 THOUSAND/UL (ref 4.31–10.16)

## 2018-01-07 PROCEDURE — 85027 COMPLETE CBC AUTOMATED: CPT | Performed by: INTERNAL MEDICINE

## 2018-01-07 PROCEDURE — 80048 BASIC METABOLIC PNL TOTAL CA: CPT | Performed by: INTERNAL MEDICINE

## 2018-01-07 PROCEDURE — 85007 BL SMEAR W/DIFF WBC COUNT: CPT | Performed by: INTERNAL MEDICINE

## 2018-01-07 RX ORDER — RANOLAZINE 500 MG/1
1000 TABLET, EXTENDED RELEASE ORAL 2 TIMES DAILY
Status: DISCONTINUED | OUTPATIENT
Start: 2018-01-07 | End: 2018-01-11 | Stop reason: HOSPADM

## 2018-01-07 RX ORDER — FUROSEMIDE 20 MG/1
20 TABLET ORAL DAILY
Status: DISCONTINUED | OUTPATIENT
Start: 2018-01-07 | End: 2018-01-08

## 2018-01-07 RX ADMIN — ISOSORBIDE MONONITRATE 60 MG: 60 TABLET, EXTENDED RELEASE ORAL at 09:37

## 2018-01-07 RX ADMIN — METOPROLOL TARTRATE 25 MG: 25 TABLET ORAL at 18:28

## 2018-01-07 RX ADMIN — Medication 1 TABLET: at 09:36

## 2018-01-07 RX ADMIN — CLOPIDOGREL BISULFATE 75 MG: 75 TABLET ORAL at 09:36

## 2018-01-07 RX ADMIN — METOPROLOL TARTRATE 25 MG: 25 TABLET ORAL at 09:35

## 2018-01-07 RX ADMIN — CYANOCOBALAMIN TAB 500 MCG 1000 MCG: 500 TAB at 09:36

## 2018-01-07 RX ADMIN — AMLODIPINE BESYLATE 2.5 MG: 2.5 TABLET ORAL at 09:37

## 2018-01-07 RX ADMIN — ATORVASTATIN CALCIUM 40 MG: 40 TABLET, FILM COATED ORAL at 18:28

## 2018-01-07 RX ADMIN — RANOLAZINE 1000 MG: 500 TABLET, FILM COATED, EXTENDED RELEASE ORAL at 18:27

## 2018-01-07 RX ADMIN — FUROSEMIDE 20 MG: 20 TABLET ORAL at 09:54

## 2018-01-07 RX ADMIN — RANOLAZINE 500 MG: 500 TABLET, FILM COATED, EXTENDED RELEASE ORAL at 09:36

## 2018-01-07 RX ADMIN — VITAMIN D, TAB 1000IU (100/BT) 1000 UNITS: 25 TAB at 09:36

## 2018-01-07 RX ADMIN — METOPROLOL TARTRATE 25 MG: 25 TABLET ORAL at 01:36

## 2018-01-07 RX ADMIN — ASPIRIN 81 MG 81 MG: 81 TABLET ORAL at 09:36

## 2018-01-07 NOTE — PROGRESS NOTES
Progress Note - Cardiology   Juan Willis 74 y o  male MRN: [id number]  Unit/Bed#: [facility] 426-01 Encounter: [id number]    Assessment:  74year old man with prior CABG  Known disease of the LCx, previously attempted intervention in 2015, was not able to be performed at the time  Subsequent stress testing with ischemia in that territory  Has had a few admission for angina, plan has been for medical management, although despite escalation of medical therapy, he continues to have angina  Comes with syptoms, and elevation in troponin, very well could be NSTEMI I   Peak troponin 5 8  Has bladder cancer, currently on chemo with thrombocytopenia  History of RCC, single kidney  Recommendations:  Patient and wife still deciding if they want to attempt cath  They request discussion between cardiology and his primary oncologist, Dr Kathy Granda at [facility] re: antiplatelet agents and other   anticoagulation used during cath, to determine if this should be attempted, and the timing  In the meantime, he is symptom free  I will continue his current antianginal thearpy, and increase the Ranexa to 1000mg twice a day  Continue lasix - mild volume overload may have been contributing to his symptoms as well  Renal function is stable  If there are plans for cath after discussion tomorrow, initiate hydration  Subjective/Objective     Subjective:  He walked down the du to the windows, and did ok, at the very end, had mild chest discomfort  His renal function has been stable  Previously, with walking very short distances, he was getting angina and shortness of breath      Objective:    Vitals: /58   Pulse 83   Temp 98 2 °F (36 8 °C) (Oral)   Resp 18   Ht 5' 10" (1 778 m)   Wt 79 8 kg (175 lb 14 8 oz)   SpO2 94%   BMI 25 24 kg/m²   Vitals:    01/06/18 1311   Weight: 79 8 kg (175 lb 14 8 oz)     Orthostatic Blood Pressures    Flowsheet Row Most Recent Value   Blood Pressure  120/58 filed at 01/07/2018 1141 Patient Position - Orthostatic VS  Sitting filed at 01/07/2018 1141            Intake/Output Summary (Last 24 hours) at 01/07/18 1230  Last data filed at 01/07/18 1001   Gross per 24 hour   Intake              640 ml   Output             1110 ml   Net             -470 ml     Physical Exam:   General appearance: alert and in no acute distress  Head: Normocephalic, without obvious abnormality, atraumatic  Lungs: minimal rales at base  Heart: S1, S2 normal and no S3 or S4  No murmurs  Abdomen: soft, non-tender; bowel sounds normal; no masses,  no organomegaly  Extremities: extremities normal, atraumatic, no cyanosis or edema  Pulses: 2+ radial pulses  Skin: Skin color, texture, turgor normal  No rashes or lesions  Neurologic: Grossly normal  Alert and oriented      Medications:    Current Facility-Administered Medications:     amLODIPine (NORVASC) tablet 2 5 mg, 2 5 mg, Oral, Daily, Raghavendra Mitchella, DO, 2 5 mg at 01/07/18 3399    aspirin chewable tablet 81 mg, 81 mg, Oral, Daily, Hetjun Soliz, DO, 81 mg at 01/07/18 0936    atorvastatin (LIPITOR) tablet 40 mg, 40 mg, Oral, QPM, Hetjun Soliz, DO, 40 mg at 01/06/18 1720    cholecalciferol (VITAMIN D3) tablet 1,000 Units, 1,000 Units, Oral, Daily, Hetul Soliz, DO, 1,000 Units at 01/07/18 0936    clopidogrel (PLAVIX) tablet 75 mg, 75 mg, Oral, Daily, Elizabeth Wilkins MD, 75 mg at 01/07/18 0936    cyanocobalamin (VITAMIN B-12) tablet 1,000 mcg, 1,000 mcg, Oral, Daily, Hetul Soliz, DO, 1,000 mcg at 01/07/18 0936    furosemide (LASIX) tablet 20 mg, 20 mg, Oral, Daily, Luis Felipe Martinez DO, 20 mg at 01/07/18 0954    isosorbide mononitrate (IMDUR) 24 hr tablet 60 mg, 60 mg, Oral, Daily, Elizabeth Wilkins MD, 60 mg at 01/07/18 0937    LORazepam (ATIVAN) tablet 0 5 mg, 0 5 mg, Oral, Q8H PRN, Hetul Soliz, DO    metoprolol tartrate (LOPRESSOR) tablet 25 mg, 25 mg, Oral, Q8H, Hetul Soliz, DO, 25 mg at 01/07/18 0935    morphine injection 1 mg, 1 mg, Intravenous, Q4H PRN, Hetul Soliz, DO    multivitamin-minerals (CENTRUM) tablet 1 tablet, 1 tablet, Oral, Daily, Hetul Soliz, DO, 1 tablet at 01/07/18 0936    nitroglycerin (NITROSTAT) SL tablet 0 4 mg, 0 4 mg, Sublingual, Q5 Min PRN, Hetul Soliz, DO    ondansetron (ZOFRAN) injection 4 mg, 4 mg, Intravenous, Q6H PRN, Hetul Soliz, DO    ranolazine (RANEXA) 12 hr tablet 500 mg, 500 mg, Oral, BID, Hetul Soliz, DO, 500 mg at 01/07/18 0936    senna (SENOKOT) tablet 8 6 mg, 1 tablet, Oral, HS PRN, Hetul Soliz, DO    Lab Results:    Results from last 7 days  Lab Units 01/06/18  0700 01/06/18  0405 01/06/18  0042   TROPONIN I ng/mL 5 20* 5 81* 3 24*       Results from last 7 days  Lab Units 01/07/18  0504 01/06/18  0417 01/06/18  0042 01/05/18  2042   WBC Thousand/uL 23 00* 21 56*  --  24 25*   HEMOGLOBIN g/dL 8 6* 8 9*  --  9 7*   HEMATOCRIT % 26 2* 28 0*  --  30 8*   PLATELETS Thousands/uL 41* 38* 42* 58*       Results from last 7 days  Lab Units 01/06/18  0417   CHOLESTEROL mg/dL 103   TRIGLYCERIDES mg/dL 124   HDL mg/dL 34*       Results from last 7 days  Lab Units 01/07/18  0504 01/06/18  0417 01/05/18  2042   SODIUM mmol/L 138 139 139   POTASSIUM mmol/L 4 7 4 7 4 8   CHLORIDE mmol/L 105 108 104   CO2 mmol/L 26 22 27   BUN mg/dL 18 22 26*   CREATININE mg/dL 1 24 1 37* 1 68*   CALCIUM mg/dL 8 5 8 2* 8 9   TOTAL PROTEIN g/dL  --   --  7 4   BILIRUBIN TOTAL mg/dL  --   --  0 55   ALK PHOS U/L  --   --  92   ALT U/L  --   --  114*   AST U/L  --   --  60*   GLUCOSE RANDOM mg/dL 101 114 213*       Results from last 7 days  Lab Units 01/06/18  0042   INR  1 12   PTT seconds 28       Results from last 7 days  Lab Units 01/06/18  0417   MAGNESIUM mg/dL 1 7     Telemetry: Personally reviewed   No significant arrhythmias noted

## 2018-01-07 NOTE — PROGRESS NOTES
NEPHROLOGY PROGRESS NOTE   Carolyn Araujo 74 y o  male MRN: [id number]  Unit/Bed#: [facility] 426-01 Encounter: [id number]  Reason for Consult: HARRIET    Assessment/Plan:     1  Acute renal failure, likely secondary to cardiorenal syndrome, peak creatinine of 1 6  2  Chronic kidney disease, baseline creatinine between 1 3 and 1 4  3  Solitary kidney function, status post nephrectomy  4  History of bladder cancer, currently receiving chemotherapy  5  Leukocytosis secondary to Neulasta  6  Thrombocytopenia, now attributed to Gemzar/chemotherapy  7  Non ST elevation MI, anticipated cardiac catheterization  8  History of coronary disease    · Overall renal function appears stable, within baseline  · Would continue with diuretic therapy, Lasix 20 mg daily  · Blood pressure acceptable  · No clear plans for cardiac catheterization, awaiting further conversation with Hematology      SUBJECTIVE:  Seen and examined  Patient awake alert  Denies any significant chest pain, shortness of Breath      OBJECTIVE:  Current Weight: Weight - Scale: 79 8 kg (175 lb 14 8 oz)  Vitals:    01/07/18 0136 01/07/18 0315 01/07/18 0400 01/07/18 0805   BP: 112/58 104/56  142/63   Pulse: 78 73  81   Resp:  18  18   Temp:  98 3 °F (36 8 °C)  98 4 °F (36 9 °C)   TempSrc:  Oral  Oral   SpO2:  98% 92% 93%   Weight:       Height:           Intake/Output Summary (Last 24 hours) at 01/07/18 0934  Last data filed at 01/07/18 0645   Gross per 24 hour   Intake              640 ml   Output             1000 ml   Net             -360 ml     GENERAL :  No distress, appears comfortable  NECK:  Supple  CV:  Regular  RESP:  Few rales at the bases bilaterally  ABD:  Soft  EXT:  No significant edema  Psych:  Appropriate  SKIN:  No rash    Medications:    Current Facility-Administered Medications:     amLODIPine (NORVASC) tablet 2 5 mg, 2 5 mg, Oral, Daily, Hetul Soliz, DO    aspirin chewable tablet 81 mg, 81 mg, Oral, Daily, Hetul Soliz, DO    atorvastatin (LIPITOR) tablet 40 mg, 40 mg, Oral, QPM, Hetul Soliz, DO, 40 mg at 01/06/18 1720    cholecalciferol (VITAMIN D3) tablet 1,000 Units, 1,000 Units, Oral, Daily, Hetul Soliz, DO    clopidogrel (PLAVIX) tablet 75 mg, 75 mg, Oral, Daily, Talita Mena MD    cyanocobalamin (VITAMIN B-12) tablet 1,000 mcg, 1,000 mcg, Oral, Daily, Hetul Soliz, DO    isosorbide mononitrate (IMDUR) 24 hr tablet 60 mg, 60 mg, Oral, Daily, Talita Mena MD    LORazepam (ATIVAN) tablet 0 5 mg, 0 5 mg, Oral, Q8H PRN, Hetul Soliz, DO    metoprolol tartrate (LOPRESSOR) tablet 25 mg, 25 mg, Oral, Q8H, Hetul Soliz, DO, 25 mg at 01/07/18 0136    morphine injection 1 mg, 1 mg, Intravenous, Q4H PRN, Hetul Soliz, DO    multivitamin-minerals (CENTRUM) tablet 1 tablet, 1 tablet, Oral, Daily, Hetul Soliz, DO    nitroglycerin (NITROSTAT) SL tablet 0 4 mg, 0 4 mg, Sublingual, Q5 Min PRN, Hetul Soliz, DO    ondansetron (ZOFRAN) injection 4 mg, 4 mg, Intravenous, Q6H PRN, Hetul Soliz, DO    ranolazine (RANEXA) 12 hr tablet 500 mg, 500 mg, Oral, BID, Hetul Soliz, DO, 500 mg at 01/06/18 1720    senna (SENOKOT) tablet 8 6 mg, 1 tablet, Oral, HS PRN, Hetul Soliz, DO    Laboratory Results:    Results from last 7 days  Lab Units 01/07/18  0504 01/06/18  0417 01/06/18  0042 01/05/18  2042   WBC Thousand/uL 23 00* 21 56*  --  24 25*   HEMOGLOBIN g/dL 8 6* 8 9*  --  9 7*   HEMATOCRIT % 26 2* 28 0*  --  30 8*   PLATELETS Thousands/uL 41* 38* 42* 58*   SODIUM mmol/L 138 139  --  139   POTASSIUM mmol/L 4 7 4 7  --  4 8   CHLORIDE mmol/L 105 108  --  104   CO2 mmol/L 26 22  --  27   BUN mg/dL 18 22  --  26*   CREATININE mg/dL 1 24 1 37*  --  1 68*   CALCIUM mg/dL 8 5 8 2*  --  8 9   MAGNESIUM mg/dL  --  1 7  --   --    PHOSPHORUS mg/dL  --  2 7  --   --    ALBUMIN g/dL  --   --   --  3 5   TOTAL PROTEIN g/dL  --   --   --  7 4   GLUCOSE RANDOM mg/dL 101 114  --  213*

## 2018-01-07 NOTE — PROGRESS NOTES
[facility] Progress Note  Patient: Zachary Garcia 74 y o  male   MRN: [id number]  PCP: Avi Batista MD  Unit/Bed#: [facility] 426-01 Encounter: [id number]  Date Of Visit: 01/07/18    Assessment/Plan:  ·   Principal Problem:    MI (myocardial infarction)  Active Problems:    Acute kidney insufficiency    Chronic stable ang[facility] ([facility] )    Hyperlipidemia    Transitional cell bladder cancer ([facility] )    NSTEMI  EKG reviewed normal sinus rhythm, right bundle-branch block, left anterior fascicular block-bifascicular block abnormal EKG compared to EKG of 6 January 2018 sinus rhythm has replaced ectopic atrial rhythm  Conservative management with Dual antiplatelet therapy, continue Ranexa 1 g b i d  for now  Follow-up with Cardiology for discussions with family regarding possible cardiac catheterization  Patient is at high risk for having these procedures with multiple comorbidities including recent chemotherapy, history of CABG in coronary artery disease, left kidney bladder cancer status post left nephrectomy  Acute on chronic renal failure cardiorenal syndrome  As per Nephrology continue diuretic therapy with Lasix 20 mg daily, renal function appears stable, follow-up CMP for BUN creatinine    History of renal and bladder cancer associated with thrombocytopenia a side effect from chemotherapy  History of prostatectomy, radiation of prostate cancer, suffered radiation cystitis, history of left nephrectomy  Labs reviewed leukocytosis of 23, H&H 8 6/26 2 platelets 83/65 consecutively  Oncology recommended monitor CBC and transfuse leuko reduced blood and platelets if clinically needed      Anemia of chronic disease secondary to chemotherapy effect  H&H stable no blood transfusion at this time  Will optimize hemoglobin more than 10 gram/deciliter if cardiac catheterization is scheduled    Hyperlipidemia-continue statin, stable  Thrombocytopenia:  Follow-up CBC for platelet transfuse if less than 20,000  Transitional cell carcinoma last chemotherapy-follow-up Oncology last chemo 3 days ago  Hypertension-stable, continue Norvasc, Imdur, metoprolol    VTE Pharmacologic Prophylaxis:   Pharmacologic: Pharmacologic VTE Prophylaxis contraindicated due to Thrombocytopenia secondary to chemotherapy  Mechanical VTE Prophylaxis in Place: Yes    Patient Centered Rounds: I have performed bedside rounds with nursing staff today  Discussions with Specialists or Other Care Team Provider:  Yes    Education and Discussions with Family / Patient:  Yes    Current Length of Stay: 1 day(s)    Current Patient Status: Inpatient   Certification Statement: The patient will continue to require additional inpatient hospital stay due to Cardiac management    Discharge Plan:  Home    Code Status: Level 2 - DNAR: but accepts endotracheal intubation      Subjective:   Patient was seen and evaluated bedside denied any chest pain, shortness of breath, changes in bowel or urinary habits  Review of systems negative otherwise  Chest pain resolved as compared to admission  Objective:     Vitals:   Temp (24hrs), Av 3 °F (36 8 °C), Min:97 9 °F (36 6 °C), Max:99 2 °F (37 3 °C)    HR:  [73-83] 82  Resp:  [17-18] 17  BP: (104-147)/(56-65) 147/65  SpO2:  [92 %-98 %] 94 %  Body mass index is 25 24 kg/m²  Input and Output Summary (last 24 hours):        Intake/Output Summary (Last 24 hours) at 18 1740  Last data filed at 18 1001   Gross per 24 hour   Intake              640 ml   Output              710 ml   Net              -70 ml       Physical Exam:  General Appearance:    Alert, cooperative, no distress, appropriately responsive , well-nourished   Head:    Normocephalic, without obvious abnormality, atraumatic, mucous membranes moist    Eyes:    Conjunctival pallor noted/corneas clear, EOM's intact   Neck:   Supple   Lungs:     Clear to auscultation bilaterally, respirations unlabored, no crackles or wheeze heard    Heart: Regular rate and rhythm, S1 and S2 no murmur   Abdomen:     Soft, non-tender, bowel sounds active all four quadrants,     no masses, no organomegaly   Extremities:   Extremities normal, atraumatic, no cyanosis or edema   Neurologic:  No focal neurologic deficit, oriented x3         Additional Data:     Labs:      Results from last 7 days  Lab Units 01/07/18  0504   WBC Thousand/uL 23 00*   HEMOGLOBIN g/dL 8 6*   HEMATOCRIT % 26 2*   PLATELETS Thousands/uL 41*   LYMPHO PCT % 2*   MONO PCT MAN % 1*   EOSINO PCT MANUAL % 2       Results from last 7 days  Lab Units 01/07/18  0504  01/05/18  2042   SODIUM mmol/L 138  < > 139   POTASSIUM mmol/L 4 7  < > 4 8   CHLORIDE mmol/L 105  < > 104   CO2 mmol/L 26  < > 27   BUN mg/dL 18  < > 26*   CREATININE mg/dL 1 24  < > 1 68*   CALCIUM mg/dL 8 5  < > 8 9   TOTAL PROTEIN g/dL  --   --  7 4   BILIRUBIN TOTAL mg/dL  --   --  0 55   ALK PHOS U/L  --   --  92   ALT U/L  --   --  114*   AST U/L  --   --  60*   GLUCOSE RANDOM mg/dL 101  < > 213*   < > = values in this interval not displayed  Results from last 7 days  Lab Units 01/06/18  0042   INR  1 12       * I Have Reviewed All Lab Data Listed Above  * Additional Pertinent Lab Tests Reviewed:  All [place] Admission Reviewed    Cultures:   Blood Culture: No results found for: BLOODCX  Urine Culture:   Lab Results   Component Value Date    URINECX >100,000 cfu/ml Gram Negative Jimy Enteric Like (A) 01/06/2018    URINECX No Growth <1000 cfu/mL 06/27/2017     Sputum Culture: No components found for: SPUTUMCX  Wound Culture: No results found for: WOUNDCULT    Last 24 Hours Medication List:     amLODIPine 2 5 mg Oral Daily   aspirin 81 mg Oral Daily   atorvastatin 40 mg Oral QPM   cholecalciferol 1,000 Units Oral Daily   clopidogrel 75 mg Oral Daily   cyanocobalamin 1,000 mcg Oral Daily   furosemide 20 mg Oral Daily   isosorbide mononitrate 60 mg Oral Daily   metoprolol tartrate 25 mg Oral Q8H multivitamin-minerals 1 tablet Oral Daily   ranolazine 1,000 mg Oral BID        Today, Patient Was Seen By: Ptaricia Jarquin MD    ** Please Note: Dragon 360 Dictation voice to text software may have been used in the creation of this document   **

## 2018-01-07 NOTE — CONSULTS
Consultation: Patient's wife and daughter are in the room  Patient was seen in consultation on 01/06/2018 at [facility] and please refer to that consultation for details  Patient has thrombocytopenia likely secondary to chemotherapy that he has been receiving for cancer in his urinary bladder and 1 of the drug is Gemzar and that commonly causes thrombocytopenia  He has leukocytosis and that is likely secondary to Neulasta that he received post chemotherapy  Patient is undergoing cardiac evaluation for chest pain and he has history of CABG and coronary artery disease  Mr Shin Haro follows with Dr Clifford Whittaker at [facility]   Patient gives history of prostatectomy and radiation for cancer of the prostate and that was more than 10 years ago  5 years ago patient had hematuria and that was thought to be secondary to radiation cystitis  In February 2017 he was 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 bladder  He had left nephrectomy  He has been getting systemic chemotherapy and he had chemotherapy on 01/03/2018 , Neulasta on 01/04/2018 and patient developed chest pain on 01/05/2018  He states he is feeling better now  No more chest pain  Blood counts abnormalities are secondary to chemotherapy, Neulasta and cancer  He has some tiredness  No fever, chills or bleeding  ROS:  Presently no headaches, seizures, diplopia, dysphagia, hoarseness,  palpitations, shortness of breath, cough, hemoptysis, abdominal pain, melena, or hematuria  No fever, chills, bleeding, bone pains, skin rash, weight loss, nausea, vomiting and no change in bowel habits  Appetite is fair  No night sweats  Patient has minor arthritic symptoms  No leg cramps  Not unusually sensitive to heat or cold  No recent or frequent infections  Other symptoms as above  Examination:  Patient is alert and oriented and not in any acute distress  No icterus  No oral thrush   No palpable neck mass  Lung fields are clear to percussion and auscultation  Heart rate is regular  Abdomen soft and nontender  No palpable abdominal mass  No ascites  No edema of ankles  No calf tenderness  No focal neurological deficit  No skin rash  No palpable lymphadenopathy  Good arterial pulses  No clubbing  Anxious  Performance status one  Assessment:  Reviewed lab results  Thrombocytopenia likely secondary to chemotherapy especially Gemzar, anemia secondary to cancer and chemotherapy, leukocytosis secondary to Neulasta, history of prostate surgery and radiation for prostate cancer more than 10 years ago  History of left nephrectomy for cancer in February 2017  Patient on systemic chemotherapy for cancer urinary bladder  Chest pain is being evaluated  Plan:  Monitoring of patient's condition and blood counts and transfusions with leuko reduced blood and platelets as needed clinically  Outpatient Oncology follow-up with Dr Sary Adams at [facility]   Discussed with patient and his family  Questions answered

## 2018-01-08 PROBLEM — N17.9 AKI (ACUTE KIDNEY INJURY) (HCC): Status: ACTIVE | Noted: 2018-01-05

## 2018-01-08 LAB
ALBUMIN SERPL BCP-MCNC: 3.3 G/DL (ref 3.5–5)
ALP SERPL-CCNC: 145 U/L (ref 46–116)
ALT SERPL W P-5'-P-CCNC: 73 U/L (ref 12–78)
ANION GAP SERPL CALCULATED.3IONS-SCNC: 7 MMOL/L (ref 4–13)
ANISOCYTOSIS BLD QL SMEAR: PRESENT
AST SERPL W P-5'-P-CCNC: 52 U/L (ref 5–45)
ATRIAL RATE: 100 BPM
ATRIAL RATE: 93 BPM
ATRIAL RATE: 93 BPM
BACTERIA UR CULT: ABNORMAL
BASOPHILS # BLD MANUAL: 0 THOUSAND/UL (ref 0–0.1)
BASOPHILS NFR MAR MANUAL: 0 % (ref 0–1)
BILIRUB SERPL-MCNC: 0.55 MG/DL (ref 0.2–1)
BUN SERPL-MCNC: 21 MG/DL (ref 5–25)
CALCIUM SERPL-MCNC: 8.6 MG/DL (ref 8.3–10.1)
CHLORIDE SERPL-SCNC: 102 MMOL/L (ref 100–108)
CO2 SERPL-SCNC: 27 MMOL/L (ref 21–32)
CREAT SERPL-MCNC: 1.25 MG/DL (ref 0.6–1.3)
EOSINOPHIL # BLD MANUAL: 0 THOUSAND/UL (ref 0–0.4)
EOSINOPHIL NFR BLD MANUAL: 0 % (ref 0–6)
ERYTHROCYTE [DISTWIDTH] IN BLOOD BY AUTOMATED COUNT: 20.8 % (ref 11.6–15.1)
GFR SERPL CREATININE-BSD FRML MDRD: 56 ML/MIN/1.73SQ M
GLUCOSE SERPL-MCNC: 110 MG/DL (ref 65–140)
HCT VFR BLD AUTO: 26.7 % (ref 36.5–49.3)
HGB BLD-MCNC: 8.6 G/DL (ref 12–17)
LYMPHOCYTES # BLD AUTO: 0.22 THOUSAND/UL (ref 0.6–4.47)
LYMPHOCYTES # BLD AUTO: 1 % (ref 14–44)
MACROCYTES BLD QL AUTO: PRESENT
MCH RBC QN AUTO: 33.1 PG (ref 26.8–34.3)
MCHC RBC AUTO-ENTMCNC: 32.2 G/DL (ref 31.4–37.4)
MCV RBC AUTO: 103 FL (ref 82–98)
MONOCYTES # BLD AUTO: 1.11 THOUSAND/UL (ref 0–1.22)
MONOCYTES NFR BLD: 5 % (ref 4–12)
NEUTROPHILS # BLD MANUAL: 20.95 THOUSAND/UL (ref 1.85–7.62)
NEUTS SEG NFR BLD AUTO: 94 % (ref 43–75)
NRBC BLD AUTO-RTO: 0 /100 WBCS
P AXIS: -44 DEGREES
P AXIS: 14 DEGREES
P AXIS: 85 DEGREES
PLATELET # BLD AUTO: 36 THOUSANDS/UL (ref 149–390)
PLATELET BLD QL SMEAR: ABNORMAL
PMV BLD AUTO: 10.8 FL (ref 8.9–12.7)
POTASSIUM SERPL-SCNC: 4.3 MMOL/L (ref 3.5–5.3)
PR INTERVAL: 192 MS
PR INTERVAL: 198 MS
PR INTERVAL: 208 MS
PROT SERPL-MCNC: 7.1 G/DL (ref 6.4–8.2)
QRS AXIS: -55 DEGREES
QRS AXIS: -56 DEGREES
QRS AXIS: -59 DEGREES
QRSD INTERVAL: 146 MS
QRSD INTERVAL: 154 MS
QRSD INTERVAL: 156 MS
QT INTERVAL: 372 MS
QT INTERVAL: 422 MS
QT INTERVAL: 428 MS
QTC INTERVAL: 479 MS
QTC INTERVAL: 524 MS
QTC INTERVAL: 532 MS
RBC # BLD AUTO: 2.6 MILLION/UL (ref 3.88–5.62)
RBC MORPH BLD: PRESENT
SODIUM SERPL-SCNC: 136 MMOL/L (ref 136–145)
T WAVE AXIS: 43 DEGREES
T WAVE AXIS: 59 DEGREES
T WAVE AXIS: 63 DEGREES
VENTRICULAR RATE: 100 BPM
VENTRICULAR RATE: 93 BPM
VENTRICULAR RATE: 93 BPM
WBC # BLD AUTO: 22.29 THOUSAND/UL (ref 4.31–10.16)

## 2018-01-08 PROCEDURE — 85027 COMPLETE CBC AUTOMATED: CPT | Performed by: INTERNAL MEDICINE

## 2018-01-08 PROCEDURE — 80053 COMPREHEN METABOLIC PANEL: CPT | Performed by: HOSPITALIST

## 2018-01-08 PROCEDURE — 85007 BL SMEAR W/DIFF WBC COUNT: CPT | Performed by: INTERNAL MEDICINE

## 2018-01-08 RX ORDER — SODIUM CHLORIDE 9 MG/ML
100 INJECTION, SOLUTION INTRAVENOUS CONTINUOUS
Status: DISCONTINUED | OUTPATIENT
Start: 2018-01-09 | End: 2018-01-08

## 2018-01-08 RX ORDER — ACETYLCYSTEINE 200 MG/ML
600 SOLUTION ORAL; RESPIRATORY (INHALATION) 2 TIMES DAILY
Status: COMPLETED | OUTPATIENT
Start: 2018-01-08 | End: 2018-01-09

## 2018-01-08 RX ORDER — ECHINACEA PURPUREA EXTRACT 125 MG
1 TABLET ORAL
Status: DISCONTINUED | OUTPATIENT
Start: 2018-01-08 | End: 2018-01-11 | Stop reason: HOSPADM

## 2018-01-08 RX ORDER — SODIUM CHLORIDE 9 MG/ML
100 INJECTION, SOLUTION INTRAVENOUS CONTINUOUS
Status: DISPENSED | OUTPATIENT
Start: 2018-01-09 | End: 2018-01-09

## 2018-01-08 RX ADMIN — Medication 1 SPRAY: at 13:33

## 2018-01-08 RX ADMIN — ACETYLCYSTEINE 600 MG: 200 SOLUTION ORAL; RESPIRATORY (INHALATION) at 18:20

## 2018-01-08 RX ADMIN — AMLODIPINE BESYLATE 2.5 MG: 2.5 TABLET ORAL at 09:53

## 2018-01-08 RX ADMIN — METOPROLOL TARTRATE 25 MG: 25 TABLET ORAL at 09:53

## 2018-01-08 RX ADMIN — ISOSORBIDE MONONITRATE 60 MG: 60 TABLET, EXTENDED RELEASE ORAL at 09:54

## 2018-01-08 RX ADMIN — METOPROLOL TARTRATE 25 MG: 25 TABLET ORAL at 16:03

## 2018-01-08 RX ADMIN — ATORVASTATIN CALCIUM 40 MG: 40 TABLET, FILM COATED ORAL at 18:19

## 2018-01-08 RX ADMIN — Medication 1 SPRAY: at 16:03

## 2018-01-08 RX ADMIN — RANOLAZINE 1000 MG: 500 TABLET, FILM COATED, EXTENDED RELEASE ORAL at 18:19

## 2018-01-08 RX ADMIN — CYANOCOBALAMIN TAB 500 MCG 1000 MCG: 500 TAB at 09:53

## 2018-01-08 RX ADMIN — FUROSEMIDE 20 MG: 20 TABLET ORAL at 09:53

## 2018-01-08 RX ADMIN — ACETYLCYSTEINE 600 MG: 200 SOLUTION ORAL; RESPIRATORY (INHALATION) at 13:29

## 2018-01-08 RX ADMIN — Medication 1 TABLET: at 09:53

## 2018-01-08 RX ADMIN — RANOLAZINE 1000 MG: 500 TABLET, FILM COATED, EXTENDED RELEASE ORAL at 09:53

## 2018-01-08 RX ADMIN — ASPIRIN 81 MG 81 MG: 81 TABLET ORAL at 09:53

## 2018-01-08 RX ADMIN — CLOPIDOGREL BISULFATE 75 MG: 75 TABLET ORAL at 09:53

## 2018-01-08 RX ADMIN — VITAMIN D, TAB 1000IU (100/BT) 1000 UNITS: 25 TAB at 09:53

## 2018-01-08 NOTE — PROGRESS NOTES
NEPHROLOGY PROGRESS NOTE   Emmanuel Beasley 74 y o  male MRN: [id number]  Unit/Bed#: [facility] 426-01 Encounter: [id number]      ASSESSMENT and PLAN:  1  HARRIET: resolved and was due to cardiorenal   2  CKD III: baseline creatinine 1 3-1 4  Creatinine today stable around 1 3   -getting cardiac cath tomorrow and cardiology ordered normal saline at 100cc/h for 5 hours starting at 4am and mucomyst x 4 doses   -holding diuretic tomorrow pre cardiac cath    -check am BMP   3  Solitary kidney s/p nephrectomy   4  History bladder cancer: on chemotherapy   5  NSTEMI: cardiac cath tomorrow   6  HTN: overall BP is acceptable         SUBJECTIVE:  Feeling well today  Anxious for cardiac cath  No chest pain or shortness of breath currently but reports he has not been exerting himself  OBJECTIVE:  Current Weight: Weight - Scale: 79 8 kg (175 lb 14 8 oz)  Vitals:    01/08/18 1152   BP: 146/70   Pulse: 76   Resp: 18   Temp: 97 9 °F (36 6 °C)   SpO2: 94%       Intake/Output Summary (Last 24 hours) at 01/08/18 1232  Last data filed at 01/08/18 0700   Gross per 24 hour   Intake                0 ml   Output              605 ml   Net             -605 ml     General: no acute distress   Skin: no rash  Eyes:  Sclera anicteric   ENT: moist mucous membranes   Neck: supple, symmetric   Chest: few crackles   CVS: regular rate and rhythm   Abdomen: soft, non-tender, non-distended   Extremities: no edema   Neuro: awake and alert   Psych: appropriate affect      Medications:  Scheduled Meds:  acetylcysteine 600 mg Oral BID   amLODIPine 2 5 mg Oral Daily   aspirin 81 mg Oral Daily   atorvastatin 40 mg Oral QPM   cholecalciferol 1,000 Units Oral Daily   clopidogrel 75 mg Oral Daily   cyanocobalamin 1,000 mcg Oral Daily   isosorbide mononitrate 60 mg Oral Daily   metoprolol tartrate 25 mg Oral Q8H   multivitamin-minerals 1 tablet Oral Daily   ranolazine 1,000 mg Oral BID       PRN Meds:  LORazepam    morphine injection    nitroglycerin   ondansetron    senna    sodium chloride    Continuous Infusions:  [START ON 1/9/2018] sodium chloride 100 mL/hr       Laboratory Results:  Lab Results   Component Value Date    WBC 22 29 (H) 01/08/2018    HGB 8 6 (L) 01/08/2018    HCT 26 7 (L) 01/08/2018     (H) 01/08/2018    PLT 36 (LL) 01/08/2018     Lab Results   Component Value Date    GLUCOSE 110 01/08/2018    CALCIUM 8 6 01/08/2018     01/08/2018    K 4 3 01/08/2018    CO2 27 01/08/2018     01/08/2018    BUN 21 01/08/2018    CREATININE 1 25 01/08/2018     Lab Results   Component Value Date    CALCIUM 8 6 01/08/2018    PHOS 2 7 01/06/2018

## 2018-01-08 NOTE — PROGRESS NOTES
[facility] Medicine Progress Note  Patient: Nicolette Vargas 74 y o  male   MRN: [id number]  PCP: Magaly Yan MD  Unit/Bed#: [facility] 426-01 Encounter: [id number]  Date Of Visit: 01/08/18    Assessment/Plan:  ·   Principal Problem:    MI (myocardial infarction)  Active Problems:    HARRIET (acute kidney injury) ([facility] )    Chronic stable angina ([facility] )    Hyperlipidemia    Transitional cell bladder cancer ([facility] )    NSTEMI  EKG reviewed normal sinus rhythm, right bundle-branch block, left anterior fascicular block-bifascicular block-abnormal EKG compared to EKG on 6 January 2018, sinus rhythm has replaced ectopic atrial rhythm  Currently on conservative management with dual antiplatelet therapy, Ranexa 1 g b i d  for now  Cardiac catheterization schedule for tomorrow as per Cardiology  Patient is at high risk for having these procedures with multiple comorbidities including recent chemotherapy, history of CABG and coronary artery disease, left kidney bladder cancer status post left nephrectomy  Vital signs reviewed patient is afebrile blood pressure 146/70, pulse rate 76 saturating 94% on room air  Labs reviewed persistent leukocytosis H&H 8 6/26 7 platelets dropping today at 36  Patient will be given a possible bare metal stent tomorrow as well as irradiated platelets prior to cardiac catheterization    Repeat CBC, to monitor H&H and platelets, avoid all medications that could lower platelets    Essential hypertension:  Stable continue metoprolol, Imdur, Lasix 20 mg daily, amlodipine 2 5 mg daily, continue blood pressure monitoring, continue telemetry monitor    Harriet on CKD stage 3 creatinine stable continue diuresis with Lasix daily, diuretics to be held prior to cardiac catheterization tomorrow, renal disease likely to be due to underlying cardiac disease, follow-up Nephrology    History of renal/bladder cancer associated with thrombocytopenia side effect of chemotherapy  History of prostatectomy, radiation prostate cancer, suffered radiation cystitis, history of left oophorectomy  Labs reviewed consistent with above findings  Oncology recommended follow-up CBC and transfuse irradiated platelets prior to cardiac cath    Hyperlipidemia-continue statin, stable  Thrombocytopenia follow-up CBC, transfuse platelets irradiated platelets prior to cardiac catheterization  Transitional cell carcinoma loss chemotherapy-follow up Oncology chemo last 4 days ago    VTE Pharmacologic Prophylaxis:   Pharmacologic: cardiac catheterization tomorrow, thrombocytopenia  Mechanical VTE Prophylaxis in Place: Yes    Patient Centered Rounds: I have performed bedside rounds with nursing staff today  Discussions with Specialists or Other Care Team Provider: yes    Education and Discussions with Family / Patient: yes    Current Length of Stay: 2 day(s)    Current Patient Status: Inpatient   Certification Statement: The patient will continue to require additional inpatient hospital stay due to medical /cardiac mgt    Discharge Plan: home    Code Status: Level 2 - DNAR: but accepts endotracheal intubation      Subjective:   Patient was seen and evaluated bedside denied any chest discomfort, shortness of breath, changes in bowel or urinary habits  Continues to have essential tremors which are chronic, chest pain resolved compared to admission review of systems negative otherwise    Objective:     Vitals:   Temp (24hrs), Av 4 °F (36 9 °C), Min:97 9 °F (36 6 °C), Max:98 9 °F (37 2 °C)    HR:  [73-84] 76  Resp:  [16-18] 18  BP: (107-147)/(57-70) 146/70  SpO2:  [93 %-94 %] 94 %  Body mass index is 25 24 kg/m²  Input and Output Summary (last 24 hours):        Intake/Output Summary (Last 24 hours) at 18 1420  Last data filed at 18 0700   Gross per 24 hour   Intake                0 ml   Output              605 ml   Net             -605 ml       Physical Exam:  General Appearance:    Alert, cooperative, no distress, appropriately responsive Head:    Normocephalic, without obvious abnormality, atraumatic, mucous membranes moist    Eyes:    Conjunctiva/corneas clear, EOM's intact   Neck:   Supple   Lungs:     Clear to auscultation bilaterally, respirations unlabored, no crackles or wheeze heard    Heart:    Regular rate and rhythm, S1 and S2 no murmur   Abdomen:     Soft, non-tender, bowel sounds active all four quadrants,     no masses, no organomegaly   Extremities:   Extremities normal, atraumatic, no cyanosis or edema   Neurologic:  Essential tremors, no focal neurologic deficits range of motion         Additional Data:     Labs:      Results from last 7 days  Lab Units 01/08/18  0450   WBC Thousand/uL 22 29*   HEMOGLOBIN g/dL 8 6*   HEMATOCRIT % 26 7*   PLATELETS Thousands/uL 36*   LYMPHO PCT % 1*   MONO PCT MAN % 5   EOSINO PCT MANUAL % 0       Results from last 7 days  Lab Units 01/08/18  0450   SODIUM mmol/L 136   POTASSIUM mmol/L 4 3   CHLORIDE mmol/L 102   CO2 mmol/L 27   BUN mg/dL 21   CREATININE mg/dL 1 25   CALCIUM mg/dL 8 6   TOTAL PROTEIN g/dL 7 1   BILIRUBIN TOTAL mg/dL 0 55   ALK PHOS U/L 145*   ALT U/L 73   AST U/L 52*   GLUCOSE RANDOM mg/dL 110       Results from last 7 days  Lab Units 01/06/18  0042   INR  1 12       * I Have Reviewed All Lab Data Listed Above  * Additional Pertinent Lab Tests Reviewed:  [facility] Admission Reviewed    Cultures:   Blood Culture: No results found for: BLOODCX  Urine Culture:   Lab Results   Component Value Date    URINECX >100,000 cfu/ml Klebsiella pneumoniae (A) 01/06/2018    URINECX No Growth <1000 cfu/mL 06/27/2017     Sputum Culture: No components found for: SPUTUMCX  Wound Culture: No results found for: WOUNDCULT    Last 24 Hours Medication List:     acetylcysteine 600 mg Oral BID   amLODIPine 2 5 mg Oral Daily   aspirin 81 mg Oral Daily   atorvastatin 40 mg Oral QPM   cholecalciferol 1,000 Units Oral Daily   clopidogrel 75 mg Oral Daily   cyanocobalamin 1,000 mcg Oral Daily isosorbide mononitrate 60 mg Oral Daily   metoprolol tartrate 25 mg Oral Q8H   multivitamin-minerals 1 tablet Oral Daily   ranolazine 1,000 mg Oral BID        Today, Patient Was Seen By: Ally Marr MD    ** Please Note: Dragon 360 Dictation voice to text software may have been used in the creation of this document   **

## 2018-01-08 NOTE — CASE MANAGEMENT
Initial Clinical Review    Admission: Date/Time/Statement: 1/6/18 @ 1448  Orders Placed This Encounter   Procedures    Inpatient Admission     Standing Status:   Standing     Number of Occurrences:   1     Order Specific Question:   Admitting Physician     Answer:   Sebastian Gunderson     Order Specific Question:   Level of Care     Answer:   Level 2 Stepdown / HOT [14]     Order Specific Question:   Estimated length of stay     Answer:   More than 2 Midnights     Order Specific Question:   Certification     Answer:   I certify that inpatient services are medically necessary for this patient for a duration of greater than two midnights  See H&P and MD Progress Notes for additional information about the patient's course of treatment  Presented to [facility]"" (admitted), transferred to [facility] via EMS    Chief Complaint: NSTEMI > Heart Cath    History of Illness:   Quintin Kirk is a 74 y o  male who presents with a known history of transitional cell carcinoma status post prior nephrectomy, coronary disease with bypass surgery back in 2004 and left cardiac catheterization performed back in 2015 which revealed a patent grafts and severe obtuse marginal disease who presents to the hospital with reported symptoms of unstable angina at [address]  The patient as above noted has a history of transitional cell carcinoma in the urinary bladder and currently is receiving chemotherapy at [facility]  He presents to the hospital anemic with associated thrombocytopenia likely secondary to underlying chemotherapy  He did recently receive Neupogen as well  He presents there with a troponin of 5  After discussion with the cardiology service was felt that the patient should be transferred to [facility] for assessment of a high risk cardiac catheterization      Vital Signs:   Temperature Pulse Respirations Blood Pressure SpO2   01/06/18 1311 01/06/18 1311 01/06/18 1311 01/06/18 1311 01/06/18 1311   (!) 97 3 °F (36 3 °C) 79 18 112/63 93 %      Temp Source Heart Rate Source Patient Position - Orthostatic VS BP Location FiO2 (%)   01/06/18 1311 01/06/18 1520 01/06/18 1311 01/06/18 1311 --   Oral Monitor Lying Left arm       Pain Score       01/06/18 1300       No Pain        Wt Readings from Last 1 Encounters:   01/06/18 79 8 kg (175 lb 14 8 oz)     Abnormal Labs/Diagnostic Test Results:   WBC 21 56  Hgl 8 9 / Hct 28 0  Platelets 38    Chest X: Interval development of mild vascular congestion Persistent cardiomegaly status post CABG     Past Medical/Surgical History: Active Ambulatory Problems     Diagnosis Date Noted    Chest pain 01/05/2018    Leukocytosis 01/05/2018    Chronic anemia 01/05/2018    Acute kidney insufficiency 01/05/2018    Chronic stable angina ([facility] ) 01/05/2018    Dyspnea 01/05/2018    CAD (coronary artery disease) 01/05/2018    Hyperlipidemia 01/05/2018    RBBB 01/05/2018    Trans[facility] cell bladder [facility] ([facility] ) 01/05/2018     Resolved Ambulatory Problems     Diagnosis Date Noted    No Resolved Ambulatory Problems     Past Medical History:   Diagnosis Date    Anesthesia     Basal cell carcinoma     Coronary artery disease     Essential tremor     Hearing aid worn     History of kidney stones     History of pneumonia     History of prostate cancer     Hx of radiation therapy     Hyperlipidemia     Hypertension     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)     Urinary incontinence     Wears glasses     Wears partial dentures        Admitting Diagnosis: MI (myocardial infarction) [I21 9]    Age/Sex: 74 y o  male    Assessment/Plan:   MI (myocardial infarction)  Active Problems:    Acute kidney insufficiency    Chronic stable angina (HCC)    Hyperlipidemia    Transitional cell bladder cancer ([facility] )        Plan for the Primary Problem(s):  · Non STEMI  ? Patient for cardiac catheterization tentatively scheduled for Monday  ?  Patient is high risk given thrombocytopenia  ? Discussed with Cardiology, for now we will continue with antiplatelet therapy for management cardiac symptoms  Will hold off on heparin drip given thrombocytopenia  We will consult Oncology for further recommendations regarding thrombocytopenia and debbie catheterization management  ? ImDur, metoprolol, aspirin, Plavix, Ranexa,  ? Lipitor     Plan for Additional Problems:   · Acute renal failure on chronic kidney disease  Patient with history of nephrectomy  § Creatinine improved  § Fluid resuscitation  § Consult Nephrology stage cord tentatively cardiac catheterization Monday  Patient with prior history nephrectomy  · Hyperlipidemia-Lipitor 20 daily  · Thrombocytopenia-likely secondary to chemotherapy  Will ask Oncology to follow given the plan for cardiac catheterization and likely need for antiplatelet therapy chronically  · Transitional cell carcinoma of bladder -oncology bowel as above  Patient is status post chemotherapy approximately 2 days ago  · Hypertension-Norvasc, imdur, metoprolol  · Leukocytosis secondary to Neulasta therapy status post chemotherapy         VTE Prophylaxis: No anticoagulation secondary to thrombocytopenia  / sequential compression device   Code Status:  Level 2  POLST: There is no POLST form on file for this patient (pre-hospital)     Anticipated Length of Stay:  Patient will be admitted on an Inpatient basis with an anticipated length of stay of  greater 2 midnights  Justification for Hospital Stay:  Needs further risk stratification with plans for tentative cardiac catheterization  Patient with known history of nephrectomy and  thrombocytopenia status post chemotherapy  Patient's risk factors will need to be modified debbie --PCI procedure      Admission Orders:  Scheduled Meds:   amLODIPine 2 5 mg Oral Daily   aspirin 81 mg Oral Daily   atorvastatin 40 mg Oral QPM   cholecalciferol 1,000 Units Oral Daily   clopidogrel 75 mg Oral Daily cyanocobalamin 1,000 mcg Oral Daily   furosemide 20 mg Oral Daily   isosorbide mononitrate 60 mg Oral Daily   metoprolol tartrate 25 mg Oral Q8H   multivitamin-minerals 1 tablet Oral Daily   ranolazine 1,000 mg Oral BID     Continuous Infusions:    PRN Meds: LORazepam    morphine injection    nitroglycerin    ondansetron    senna    sodium chloride    --------------------------------------------------------------------------------------------------------------------------------------------------------------    1/6/2018   Consult Nephro  Assessment/Plan:   1  Acute renal failure, likely secondary to cardiorenal syndrome, peak creatinine of 1 6  2  Chronic kidney disease, baseline creatinine between 1 3 and 1 4  3  Solitary kidney function, status post nephrectomy  4  History of bladder cancer, currently receiving chemotherapy  5  Leukocytosis secondary to Neulasta  6  Thrombocytopenia, now attributed to Gemzar/chemotherapy  7  Non ST elevation MI, anticipated cardiac catheterization  8   History of coronary disease      · Overall his renal function appears fairly stable, creatinine again baseline between 1 31 4  · Volume status appears improved, recently given IV Lasix  · Would give Lasix as needed, again volume status appears improved  · Discussed risks for cardiac catheterization including IV dye , would recommend IV saline as well as Mucomyst prior to cardiac catheterization  · Thrombocytopenia secondary to chemotherapy      --------------------------------------------------------------------------------------------------------------------

## 2018-01-08 NOTE — PROGRESS NOTES
Pt  Had no diet ordered for dinner this pm  Has NPO after [facility] ordered for cath lab tomorrow  Placed a temp diet order for cardiac diet that was in place earlier today and should have remained in effect until NPO order at [facility]

## 2018-01-08 NOTE — CASE MANAGEMENT
Initial Clinical Review    Admission: Date/Time/Statement: 1/6/18 @ 0615  INPT  OBS 1/5 @ 2159    Orders Placed This Encounter   Procedures    Place in Observation (expected length of stay for this patient is less than two midnights)     Standing Status:   Standing     Number of Occurrences:   1     Order Specific Question:   Admitting Physician     Answer:   [id number]     Order Specific Question:   Level of Care     Answer:   Med Surg [[id number]]    Inpatient Admission     Standing Status:   Standing     Number of Occurrences:   1     Order Specific Question:   Admitting Physician     Answer:   Adolfo Lennon [[id number]]     Order Specific Question:   Level of Care     Answer:   Med Surg [16]     Order Specific Question:   Bed request comments     Answer:   telemetry     Order Specific Question:   Estimated length of stay     Answer:   More than 2 Midnights     Order Specific Question:   Certification     Answer:   I certify that inpatient services are medically necessary for this patient for a duration of greater than two midnights  See H&P and MD Progress Notes for additional information about the patient's course of treatment  Comments:   NSTEMI       ED: Date/Time/Mode of Arrival:   ED Arrival Information     Expected Arrival Acuity Means of Arrival Escorted By Service Admission Type    - 1/5/2018 20:34 Emergent Ambulance [address] [facility] Emergency    Arrival Complaint    Chest Pain           Chief Complaint:   Chief Complaint   Patient presents with    Chest Pain     Pt reports onset of CP intermittent since 1530 when taking out mail, arrives via EMS, took 2 nitro PTA, "heavy" substernal, denies radiation/NV/diaphoresis, pt a a o x 3 upon arrival to ED  History of Illness: 75 YO male presents with chest pain starting tonight  Pt states the pain started after he was coming in from outside   Notes this was a pressure over the anterior chest, non-radiating, associated with shortness of breath  Pt denies associated lightheadedness, palpitations, nausea, vomiting or diaphoresis  Pt took 2 nitroglycerin and states the pain did improve  EMS was called and the pt received 325mg of aspirin en route  He arrives to the ED with no complaints  Pt states he has had similar symptoms in the past, was recently at [facility] for similar, states it has been returning more frequently and with less exertion  Pt does have a Hx of CABG and was told he continued to have blockages on his last catheterization  Pt denies F/C/N/V/D/C, no dysuria, burning on urination or blood in urine      ED Vital Signs:   ED Triage Vitals [01/05/18 2039]   Temperature Pulse Respirations Blood Pressure SpO2   98 5 °F (36 9 °C) 105 20 130/91 96 %      Temp Source Heart Rate Source Patient Position - Orthostatic VS BP Location FiO2 (%)   Oral Monitor Lying Left arm --      Pain Score       2        Wt Readings from Last 1 Encounters:   01/06/18 79 8 kg (175 lb 14 8 oz)       Abnormal Labs/Diagnostic Test Results:    anc 22 80  Results Reviewed      Procedure Component Value Units Date/Time     CBC and differential [[id number]]  (Abnormal) Collected:  01/05/18 2042     Lab Status:  Final result Specimen:  Blood from Arm, Left Updated:  01/05/18 2133       WBC 24 25 (H) Thousand/uL         RBC 2 90 (L) Million/uL         Hemoglobin 9 7 (L) g/dL         Hematocrit 30 8 (L) %          (H) fL         MCH 33 4 pg         MCHC 31 5 g/dL         RDW 20 5 (H) %         MPV 9 7 fL         Platelets 58 (L) Thousands/uL       Comprehensive metabolic panel [[id number]]  (Abnormal) Collected:  01/05/18 2042     Lab Status:  Final result Specimen:  Blood from Arm, Left Updated:  01/05/18 2102       Sodium 139 mmol/L         Potassium 4 8 mmol/L         Chloride 104 mmol/L         CO2 27 mmol/L         Anion Gap 8 mmol/L         BUN 26 (H) mg/dL         Creatinine 1 68 (H) mg/dL         Glucose 213 (H) mg/dL         Calcium 8 9 mg/dL         AST 60 (H) U/L          (H) U/L         Alkaline Phosphatase 92 U/L         Total Protein 7 4 g/dL         Albumin 3 5 g/dL         Total Bilirubin 0 55 mg/dL         eGFR 39 ml/min/1 73sq m       CXR: Interval development of mild vascular congestion Persistent cardiomegaly status post CABG    ED Treatment:   Medication Administration from 01/05/2018 2034 to 01/06/2018 0248       Date/Time Order Dose Route Action Action by Comments     01/05/2018 2300 sodium chloride 0 9 % bolus 1,000 mL 0 mL Intravenous Stopped Tanja Rose RN      01/05/2018 2125 sodium chloride 0 9 % bolus 1,000 mL 1,000 mL Intravenous New Bag Tanja Rose RN      01/05/2018 2116 aspirin tablet 325 mg   Oral Canceled Entry Tanja Rose RN      01/06/2018 0043 sodium chloride 0 9 % infusion 75 mL/hr Intravenous [facility]      01/06/2018 0031 atorvastatin (LIPITOR) tablet 40 mg 40 mg Oral Not Given Cinda Carroll RN Patient took atorvastatin this evening pta  01/06/2018 0032 metoprolol tartrate (LOPRESSOR) tablet 25 mg 25 mg Oral Not Given Cinda Carroll RN Patient took metoprolol this evening pta  01/06/2018 0100 heparin (porcine) subcutaneous injection 5,000 Units 5,000 Units Subcutaneous Given Luz العلي RN           Past Medical/Surgical History:    Active Ambulatory Problems     Diagnosis Date Noted    Chronic anemia 01/05/2018    [facility] 01/05/2018    CAD (coronary artery disease) 01/05/2018    Hyperlipidemia 01/05/2018    RBBB 01/05/2018    [facility] 01/05/2018    MI (myocardial infarction) 01/06/2018     Resolved Ambulatory Problems     Diagnosis Date Noted    No Resolved Ambulatory Problems     Past Medical History:   Diagnosis Date    Anesthesia     Basal cell carcinoma     Coronary artery disease     Essential tremor     Hearing aid worn     History of kidney stones     History of pneumonia     History of prostate cancer     Hx of radiation therapy     Hyperlipidemia     Hypertension     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)     Urinary incontinence     Wears glasses     Wears partial dentures        Admitting Diagnosis: Chest pain [R07 9]    Age/Sex: 74 y o  male    Assessment/Plan: Principal Problem:    MI (myocardial infarction)  Active Problems:    Acute kidney insufficiency    Chronic stable angina (HCC)    Hyperlipidemia    Transitional cell bladder cancer ([facility] )        Plan for the Primary Problem(s):  · Non STEMI  ? Patient for cardiac catheterization tentatively scheduled for Monday  ? Patient is high risk given thrombocytopenia  ? Discussed with Cardiology, for now we will continue with antiplatelet therapy for management cardiac symptoms  Will hold off on heparin drip given thrombocytopenia  We will consult Oncology for further recommendations regarding thrombocytopenia and debbie catheterization management  ? ImDur, metoprolol, aspirin, Plavix, Ranexa,  ? Lipitor     Plan for Additional Problems:   · Acute renal failure on chronic kidney disease  Patient with history of nephrectomy  § Creatinine improved  § Fluid resuscitation  § Consult Nephrology stage cord tentatively cardiac catheterization Monday  Patient with prior history nephrectomy  · Hyperlipidemia-Lipitor 20 daily  · Thrombocytopenia-likely secondary to chemotherapy  Will ask Oncology to follow given the plan for cardiac catheterization and likely need for antiplatelet therapy chronically  · Transitional cell carcinoma of bladder -oncology bowel as above  Patient is status post chemotherapy approximately 2 days ago    · Hypertension-Norvasc, imdur, metoprolol  · Leukocytosis secondary to Neulasta therapy status post chemotherapy         VTE Prophylaxis: No anticoagulation secondary to thrombocytopenia  / sequential compression device   Code Status:  Level 2  POLST: There is no POLST form on file for this patient (pre-hospital)     Anticipated Length of Stay:  Patient will be admitted on an Inpatient basis with an anticipated length of stay of  greater 2 midnights  Justification for Hospital Stay:  Needs further risk stratification with plans for tentative cardiac catheterization  Patient with known history of nephrectomy and  thrombocytopenia status post chemotherapy  Patient's risk factors will need to be modified debbie --PCI procedure      Admission Orders:  OBS:  Upgraded to INPT 1/6  TELE  EKG with 3rd trop  EKG with CP or ST changes  Cont trops x 3  Consult Cardio  Consult Hematology    Scheduled Meds:   ASA qd  Lipitor qd  Lopressor q 12h  Norvasc qd  Heparin sq q8h  Vit D3 and Vit B12 qd  Imdur qd  Centrum qd  Ranexa bid  Plavix 300 po qd    MS IV prn    NTP x 1  Continuous Infusions:  IVF At 75 / hr    PRN Meds: NTG sl prn x 1 1/6  Ativan  Po prn  NPO    Transferred to [facility] 1/6/2018

## 2018-01-08 NOTE — PHYSICIAN ADVISOR
Current patient class: Inpatient  The patient is currently on Hospital Day: 3      The patient was admitted to the hospital at 1225 on 1/6/18 for the following diagnosis:  MI (myocardial infarction) [I21 9]       There is documentation in the medical record of an expected length of stay of at least 2 midnights  The patient is therefore expected to satisfy the 2 midnight benchmark and given the 2 midnight presumption is appropriate for INPATIENT ADMISSION  Given this expectation of a satisfying stay, CMS instructs us that the patient is most often appropriate for inpatient admission under part A provided medical necessity is documented in the chart  After review of the relevant documentation, labs, vital signs and test results, the patient is appropriate for INPATIENT ADMISSION  Admission to the hospital as an inpatient is a complex decision making process which requires the practitioner to consider the patients presenting complaint, history and physical examination and all relevant testing  With this in mind, in this case, the patient was deemed appropriate for INPATIENT ADMISSION  After review of the documentation and testing available at the time of the admission I concur with this clinical determination of medical necessity  Rationale is as follows: The patient is a 74 yrs old Male who presented to the ED at 1/6/2018 12:25 PM with a chief complaint of No chief complaint on file  Patient admitted with a NSTEMI  He also has a history significant for renal failure and thrombocytopenia  He has been having intermittent chest pain and was seen by cardiology who recommended a cardiac catheterization  The test was scheduled pending oncology clearance for the concern of his thrombocytopenia   This admission would be appropriate for consideration of a two night admission status    The patients vitals on arrival were ED Triage Vitals   Temperature Pulse Respirations Blood Pressure SpO2   01/06/18 1311 01/06/18 1311 01/06/18 1311 01/06/18 1311 01/06/18 1311   (!) 97 3 °F (36 3 °C) 79 18 112/63 93 %      Temp Source Heart Rate Source Patient Position - Orthostatic VS BP Location FiO2 (%)   01/06/18 1311 01/06/18 1520 01/06/18 1311 01/06/18 1311 --   Oral Monitor Lying Left arm       Pain Score       01/06/18 1300       No Pain           Past Medical History:   Diagnosis Date    Anesthesia     "can't urinate after surgery"    Basal cell carcinoma     Coronary artery disease     Essential tremor     of head and hands bilat    Hearing aid worn     bilat    History of kidney stones     History of pneumonia     childhood    History of prostate cancer     Hx of radiation therapy     2007 for after prostate surgery    Hyperlipidemia     Hypertension     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)     and" left kidney and left ureter removed"    Urinary incontinence     wears Depends    Wears glasses     Wears partial dentures     lower     Past Surgical History:   Procedure Laterality Date    APPENDECTOMY      BACK SURGERY      lumbar herniated disc repair    CARDIAC CATHETERIZATION      CORONARY ARTERY BYPASS GRAFT      x2 in 2004   [address] [place] [address] [address] N/A 7/3/2017    Procedure: BLADDER BIOPSY;  Surgeon: Nixon Meza MD;  Location: [facility];  Service: Urology    CYSTOSCOPY W/ RETROGRADES Right 7/3/2017    Procedure: CYSTOSCOPY WITH RETROGRADE PYELOGRAM;  Surgeon: Nixon Meza MD;  Location: [facility];  Service: Urology    HERNIA REPAIR      NEPHRECTOMY Left     and left ureter    PROSTATECTOMY      ROTATOR CUFF REPAIR Left     SHOULDER SURGERY      dislocation           Consults have been placed to:   IP CONSULT TO NEPHROLOGY  IP CONSULT TO ONCOLOGY    Vitals:    01/08/18 0009 01/08/18 0400 01/08/18 0745 01/08/18 1152   BP: 107/57 126/66 118/63 146/70   Pulse: 73 75 80 76   Resp: 16 18 18 18   Temp: 98 9 °F (37 2 °C) 98 3 °F (36 8 °C) 98 4 °F (36 9 °C) 97 9 °F (36 6 °C)   TempSrc: Oral Oral Oral Oral   SpO2: 93% 94% 94% 94%   Weight:       Height:           Most recent labs:    Recent Labs      01/05/18   2042  01/06/18   0042   01/06/18   0700   01/08/18   0450   WBC  24 25*   --    < >   --    < >  22 29*   HGB  9 7*   --    < >   --    < >  8 6*   HCT  30 8*   --    < >   --    < >  26 7*   PLT  58*  42*   < >   --    < >  36*   K  4 8   --    < >   --    < >  4 3   NA  139   --    < >   --    < >  136   CALCIUM  8 9   --    < >   --    < >  8 6   BUN  26*   --    < >   --    < >  21   CREATININE  1 68*   --    < >   --    < >  1 25   INR   --   1 12   --    --    --    --    TROPONINI   --   3 24*   < >  5 20*   --    --    AST  60*   --    --    --    --   52*   ALT  114*   --    --    --    --   73   ALKPHOS  92   --    --    --    --   145*   BILITOT  0 55   --    --    --    --   0 55    < > = values in this interval not displayed  Scheduled Meds:  acetylcysteine 600 mg Oral BID   amLODIPine 2 5 mg Oral Daily   aspirin 81 mg Oral Daily   atorvastatin 40 mg Oral QPM   cholecalciferol 1,000 Units Oral Daily   clopidogrel 75 mg Oral Daily   cyanocobalamin 1,000 mcg Oral Daily   isosorbide mononitrate 60 mg Oral Daily   metoprolol tartrate 25 mg Oral Q8H   multivitamin-minerals 1 tablet Oral Daily   ranolazine 1,000 mg Oral BID     Continuous Infusions:  [START ON 1/9/2018] sodium chloride 100 mL/hr     PRN Meds:  LORazepam    morphine injection    nitroglycerin    ondansetron    senna    sodium chloride    Surgical procedures (if appropriate):

## 2018-01-08 NOTE — PROGRESS NOTES
Patient's wife is in the room  This is a follow-up visit for thrombocytopenia, anemia and leukocytosis  Patient has previous history prostate cancer, more than 10 years ago and he had surgery and radiation  Left nephrectomy in February 2017 for cancer  Presently on chemotherapy including Gemzar for cancer urinary bladder  Patient received chemotherapy on 01/03 and Neulasta on 01/04  Thrombocytopenia is most likely secondary to chemotherapy especially Gemzar  Leukocytosis is most likely secondary to Neulasta  Anemia probably secondary to chemotherapy and cancer  No fever, chills or bleeding  Patient states he has decided to go ahead with cardiac catheterization for chest pain  He has history of coronary artery disease  Noted and discussed blood counts    Outpatient oncology follow-up with patient's primary oncologist Dr Jessica Hammond at [facility]

## 2018-01-08 NOTE — PROGRESS NOTES
Progress Note - Cardiology   Quintin Kirk 74 y o  male MRN: [id number]  Encounter: [id number]  01/08/18  11:03 AM        Assessment/Plan:    1  Recurrent angina now with possible NSTEMI type I  On aspirin, Plavix, statin, beta blocker, Imdur 60 and Ranexa 1000 bid  No current CP today, but did have some yesterday, and is concerned that if he goes home, will have CP again with exertion  Explained to him that cath is really only other available option at this time, he is agreeable to proceed, while awaiting call back from covering oncologist  May need platelet transfusion prior to cath, recheck CBC in AM      2  HTN  BP controlled with Metoprolol, Imdur, Lasix 20 and Amlodipine 2 5      3  HARRIET on CKD III  Creatinine stable, improved from peak of 1 6, on Lasix 20 PO daily  Baseline 1 3-1 4  Will hold Lasix in prep for cath 1/9, start Mucomyst, IV fluids for 1/9  Check BMP in AM     4  HL  On Atorvastatin 40, LDL at goal       More than 30 minutes spent speaking with patient and wife, attempting to reach patient oncologist, and preparing patient for possible cath 1/9  Addendum: received call from Dr Hortencia Gillis, covering for Dr Justin Wick, who agrees with proceeding with cardiac cath, but recommends considering using bare metal stent rather than drug eluting stent if possible as patient will continue to receive chemo for bladder ca and this will lessen the amount of time he will need to take dual antiplatelet therapy  He feels he has adequate life expectancy regarding his cancer and so would be an appropriate candidate for cath/PCI  Subjective/Objective   Chief Complaint: No chief complaint on file        Subjective: 74year old man with a history of CAD s/p CABG, HTN, HL, bladder cancer undergoing chemotherapy, CKD III s/p nephrectomy for RCC, thrombocytopenia, who was admitted with recurrent exertional angina at [facility], troponin elevation concerning for NSTEMI, who was transferred to [facility] for possible high risk cardiac catheterization/PCI of known LCX disease which is likely causing his symptoms, despite maximal medical management  Attempted to reach Dr Hernando Peterson, his usual oncologist, but he is out of office this week due to family death  Left message to have covering oncologist call me  Did speak with Dr Jose F Gaines nurse  Has been seen by oncologist here, who felt that he could proceed with cath and be treated with infusion of platelets and PRBCs as needed  No current chest pain or SOB  No LE edema  Still unsure if he wants to proceed with cardiac catheterization, but understands that no further medical management is really available at this time to help treat recurrent symptoms       Patient Active Problem List   Diagnosis    Chest pain    Leukocytosis    Chronic anemia    Acute kidney insufficiency    Chronic stable angina (HCC)    Dyspnea    CAD (coronary artery disease)    Hyperlipidemia    RBBB    Transitional cell bladder [facility] ([facility] )    MI (myocardial infarction)     Past Medical History:   Diagnosis Date    Anesthesia     "can't urinate after surgery"    Basal cell carcinoma     Coronary artery disease     Essential tremor     of head and hands bilat    Hearing aid worn     bilat    History of kidney stones     History of pneumonia     childhood    History of prostate cancer     Hx of radiation therapy     2007 for after prostate surgery    Hyperlipidemia     Hypertension     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 ([facility] )     and" left kidney and left ureter removed"    Urinary incontinence     wears Depends    Wears glasses     Wears partial dentures     lower       Allergies   Allergen Reactions    Levaquin [Levofloxacin] Rash    Percocet [Oxycodone-Acetaminophen] GI Intolerance     Constipation,,,happens with narcotics like percocet       Current Facility-Administered Medications   Medication Dose Route Frequency Provider Last Rate Last Dose    amLODIPine (NORVASC) tablet 2 5 mg  2 5 mg Oral Daily Hetul Soliz, DO   2 5 mg at 01/08/18 2024    aspirin chewable tablet 81 mg  81 mg Oral Daily Hetul Soliz, DO   81 mg at 01/08/18 0953    atorvastatin (LIPITOR) tablet 40 mg  40 mg Oral QPM Hetul Soliz, DO   40 mg at 01/07/18 1828    cholecalciferol (VITAMIN D3) tablet 1,000 Units  1,000 Units Oral Daily Hetul Soliz, DO   1,000 Units at 01/08/18 0953    clopidogrel (PLAVIX) tablet 75 mg  75 mg Oral Daily Abebe Rodas MD   75 mg at 01/08/18 0953    cyanocobalamin (VITAMIN B-12) tablet 1,000 mcg  1,000 mcg Oral Daily Hetul Soliz, DO   1,000 mcg at 01/08/18 7768    furosemide (LASIX) tablet 20 mg  20 mg Oral Daily Melba Martinez, DO   20 mg at 01/08/18 6376    isosorbide mononitrate (IMDUR) 24 hr tablet 60 mg  60 mg Oral Daily Abebe Rodas MD   60 mg at 01/08/18 0954    LORazepam (ATIVAN) tablet 0 5 mg  0 5 mg Oral Q8H PRN Hetul Soliz, DO        metoprolol tartrate (LOPRESSOR) tablet 25 mg  25 mg Oral Q8H Hetul Soliz, DO   25 mg at 01/08/18 0953    morphine injection 1 mg  1 mg Intravenous Q4H PRN Hetul Soliz, DO        multivitamin-minerals (CENTRUM) tablet 1 tablet  1 tablet Oral Daily Hetul Soliz, DO   1 tablet at 01/08/18 0953    nitroglycerin (NITROSTAT) SL tablet 0 4 mg  0 4 mg Sublingual Q5 Min PRN Hetul Soliz, DO        ondansetron (ZOFRAN) injection 4 mg  4 mg Intravenous Q6H PRN Hetul Soliz, DO        ranolazine (RANEXA) 12 hr tablet 1,000 mg  1,000 mg Oral BID Mario Aguayo MD   1,000 mg at 01/08/18 0953    senna (SENOKOT) tablet 8 6 mg  1 tablet Oral HS PRN Hetul Soliz, DO        sodium chloride (OCEAN) 0 65 % nasal spray 1 spray  1 spray Each Nare Q1H PRN Val Ballesteros MD           Vitals: /63 Comment: Map 85  Pulse 80   Temp 98 4 °F (36 9 °C) (Oral)   Resp 18   Ht 5' 10" (1 778 m)   Wt 79 8 kg (175 lb 14 8 oz)   SpO2 94%   BMI 25 24 kg/m²     Intake/Output Summary (Last 24 hours) at 01/08/18 1103  Last data filed at 01/08/18 0700 Gross per 24 hour   Intake                0 ml   Output              605 ml   Net             -605 ml     Wt Readings from Last 3 Encounters:   01/06/18 79 8 kg (175 lb 14 8 oz)   01/05/18 77 1 kg (170 lb)   07/03/17 77 6 kg (171 lb)       Body mass index is 25 24 kg/m²  ,     Vitals:    01/07/18 1946 01/08/18 0009 01/08/18 0400 01/08/18 0745   BP: 143/68 107/57 126/66 118/63   Pulse: 81 73 75 80   Patient Position - Orthostatic VS: Lying Lying  Sitting       Physical Exam:     GEN: Awake and alert, in no acute distress  HEENT: Sclera anicteric, conjunctivae pink, mucous membranes moist   NECK: Supple, no carotid bruits, no significant JVD  HEART: Regular rhythm, normal S1 and S2, no murmurs, clicks, gallops or rubs  LUNGS: Clear to auscultation bilaterally; no wheezes, rales, or rhonchi   ABDOMEN: Soft, nontender, nondistended, normoactive bowel sounds  EXTREMITIES: Skin warm and well perfused, no clubbing, cyanosis, or edema  NEURO: No focal findings  SKIN: Normal without suspicious lesions on exposed skin        Lab Results:     BMP:  Results from last 7 days  Lab Units 01/08/18  0450 01/07/18  0504 01/06/18  0417 01/05/18  2042   SODIUM mmol/L 136 138 139 139   POTASSIUM mmol/L 4 3 4 7 4 7 4 8   CHLORIDE mmol/L 102 105 108 104   CO2 mmol/L 27 26 22 27   BUN mg/dL 21 18 22 26*   CREATININE mg/dL 1 25 1 24 1 37* 1 68*   GLUCOSE RANDOM mg/dL 110 101 114 213*   CALCIUM mg/dL 8 6 8 5 8 2* 8 9       CBC:   Results from last 7 days  Lab Units 01/08/18  0450 01/07/18  0504 01/06/18  0417 01/06/18  0042 01/05/18  2042   WBC Thousand/uL 22 29* 23 00* 21 56*  --  24 25*   HEMOGLOBIN g/dL 8 6* 8 6* 8 9*  --  9 7*   HEMATOCRIT % 26 7* 26 2* 28 0*  --  30 8*   MCV fL 103* 104* 105*  --  106*   PLATELETS Thousands/uL 36* 41* 38* 42* 58*   MCH pg 33 1 34 0 33 5  --  33 4   MCHC g/dL 32 2 32 8 31 8  --  31 5   RDW % 20 8* 20 8* 20 7*  --  20 5*   MPV fL 10 8 11 0 10 8 11 2 9 7   NRBC AUTO /100 WBCs 0 0  --   --   -- Results from last 7 days  Lab Units 01/06/18  0700 01/06/18  0405 01/06/18  0042   TROPONIN I ng/mL 5 20* 5 81* 3 24*        Results from last 7 days  Lab Units 01/06/18  0417   MAGNESIUM mg/dL 1 7       INR:     Results from last 7 days  Lab Units 01/06/18  0042   INR  1 12       Lipid Profile:   Lab Results   Component Value Date    CHOL 103 01/06/2018     Lab Results   Component Value Date    HDL 34 (L) 01/06/2018     Lab Results   Component Value Date    LDLCALC 44 01/06/2018     Lab Results   Component Value Date    TRIG 124 01/06/2018       Telemetry: personally reviewed, SR, no events

## 2018-01-09 ENCOUNTER — APPOINTMENT (INPATIENT)
Dept: NON INVASIVE DIAGNOSTICS | Facility: HOSPITAL | Age: 73
DRG: 281 | End: 2018-01-09
Attending: INTERNAL MEDICINE
Payer: MEDICARE

## 2018-01-09 PROBLEM — I21.A1 NON-ST ELEVATION MYOCARDIAL INFARCTION (NSTEMI), TYPE 2: Status: ACTIVE | Noted: 2018-01-06

## 2018-01-09 LAB
ALBUMIN SERPL BCP-MCNC: 3.2 G/DL (ref 3.5–5)
ALP SERPL-CCNC: 134 U/L (ref 46–116)
ALT SERPL W P-5'-P-CCNC: 74 U/L (ref 12–78)
ANION GAP SERPL CALCULATED.3IONS-SCNC: 10 MMOL/L (ref 4–13)
ANISOCYTOSIS BLD QL SMEAR: PRESENT
AST SERPL W P-5'-P-CCNC: 47 U/L (ref 5–45)
BASOPHILS # BLD MANUAL: 0 THOUSAND/UL (ref 0–0.1)
BASOPHILS NFR MAR MANUAL: 0 % (ref 0–1)
BILIRUB SERPL-MCNC: 0.51 MG/DL (ref 0.2–1)
BUN SERPL-MCNC: 19 MG/DL (ref 5–25)
CALCIUM SERPL-MCNC: 8.7 MG/DL (ref 8.3–10.1)
CHLORIDE SERPL-SCNC: 102 MMOL/L (ref 100–108)
CO2 SERPL-SCNC: 24 MMOL/L (ref 21–32)
CREAT SERPL-MCNC: 1.31 MG/DL (ref 0.6–1.3)
EOSINOPHIL # BLD MANUAL: 0.18 THOUSAND/UL (ref 0–0.4)
EOSINOPHIL NFR BLD MANUAL: 1 % (ref 0–6)
ERYTHROCYTE [DISTWIDTH] IN BLOOD BY AUTOMATED COUNT: 20.6 % (ref 11.6–15.1)
GFR SERPL CREATININE-BSD FRML MDRD: 53 ML/MIN/1.73SQ M
GLUCOSE SERPL-MCNC: 110 MG/DL (ref 65–140)
HCT VFR BLD AUTO: 26 % (ref 36.5–49.3)
HGB BLD-MCNC: 8.6 G/DL (ref 12–17)
INR PPP: 1.09 (ref 0.86–1.16)
LDH SERPL-CCNC: 260 IU/L (ref 121–224)
LDH1 CFR SERPL ELPH: 19 % (ref 17–32)
LDH2 CFR SERPL ELPH: 32 % (ref 25–40)
LDH3 CFR SERPL ELPH: 21 % (ref 17–27)
LDH4 CFR SERPL ELPH: 11 % (ref 5–13)
LDH5 CFR SERPL ELPH: 17 % (ref 4–20)
LYMPHOCYTES # BLD AUTO: 0.37 THOUSAND/UL (ref 0.6–4.47)
LYMPHOCYTES # BLD AUTO: 2 % (ref 14–44)
MACROCYTES BLD QL AUTO: PRESENT
MCH RBC QN AUTO: 33.7 PG (ref 26.8–34.3)
MCHC RBC AUTO-ENTMCNC: 33.1 G/DL (ref 31.4–37.4)
MCV RBC AUTO: 102 FL (ref 82–98)
METAMYELOCYTES NFR BLD MANUAL: 2 % (ref 0–1)
MONOCYTES # BLD AUTO: 1.11 THOUSAND/UL (ref 0–1.22)
MONOCYTES NFR BLD: 6 % (ref 4–12)
MYELOCYTES NFR BLD MANUAL: 1 % (ref 0–1)
NEUTROPHILS # BLD MANUAL: 16.21 THOUSAND/UL (ref 1.85–7.62)
NEUTS SEG NFR BLD AUTO: 88 % (ref 43–75)
NRBC BLD AUTO-RTO: 0 /100 WBCS
PLATELET # BLD AUTO: 25 THOUSANDS/UL (ref 149–390)
PLATELET BLD QL SMEAR: ABNORMAL
PMV BLD AUTO: 10.8 FL (ref 8.9–12.7)
POIKILOCYTOSIS BLD QL SMEAR: PRESENT
POTASSIUM SERPL-SCNC: 4.2 MMOL/L (ref 3.5–5.3)
PROT SERPL-MCNC: 7 G/DL (ref 6.4–8.2)
PROTHROMBIN TIME: 14.1 SECONDS (ref 12.1–14.4)
RBC # BLD AUTO: 2.55 MILLION/UL (ref 3.88–5.62)
RBC MORPH BLD: PRESENT
SODIUM SERPL-SCNC: 136 MMOL/L (ref 136–145)
WBC # BLD AUTO: 18.42 THOUSAND/UL (ref 4.31–10.16)

## 2018-01-09 PROCEDURE — C1894 INTRO/SHEATH, NON-LASER: HCPCS | Performed by: INTERNAL MEDICINE

## 2018-01-09 PROCEDURE — 85007 BL SMEAR W/DIFF WBC COUNT: CPT | Performed by: HOSPITALIST

## 2018-01-09 PROCEDURE — 85610 PROTHROMBIN TIME: CPT | Performed by: INTERNAL MEDICINE

## 2018-01-09 PROCEDURE — C1769 GUIDE WIRE: HCPCS | Performed by: INTERNAL MEDICINE

## 2018-01-09 PROCEDURE — 99153 MOD SED SAME PHYS/QHP EA: CPT | Performed by: INTERNAL MEDICINE

## 2018-01-09 PROCEDURE — B2131ZZ FLUOROSCOPY OF MULTIPLE CORONARY ARTERY BYPASS GRAFTS USING LOW OSMOLAR CONTRAST: ICD-10-PCS | Performed by: INTERNAL MEDICINE

## 2018-01-09 PROCEDURE — 85027 COMPLETE CBC AUTOMATED: CPT | Performed by: HOSPITALIST

## 2018-01-09 PROCEDURE — B2111ZZ FLUOROSCOPY OF MULTIPLE CORONARY ARTERIES USING LOW OSMOLAR CONTRAST: ICD-10-PCS | Performed by: INTERNAL MEDICINE

## 2018-01-09 PROCEDURE — 93455 CORONARY ART/GRFT ANGIO S&I: CPT | Performed by: INTERNAL MEDICINE

## 2018-01-09 PROCEDURE — C1760 CLOSURE DEV, VASC: HCPCS | Performed by: INTERNAL MEDICINE

## 2018-01-09 PROCEDURE — 80053 COMPREHEN METABOLIC PANEL: CPT | Performed by: HOSPITALIST

## 2018-01-09 PROCEDURE — 99152 MOD SED SAME PHYS/QHP 5/>YRS: CPT | Performed by: INTERNAL MEDICINE

## 2018-01-09 RX ORDER — METOPROLOL TARTRATE 50 MG/1
50 TABLET, FILM COATED ORAL EVERY 12 HOURS SCHEDULED
Status: DISCONTINUED | OUTPATIENT
Start: 2018-01-09 | End: 2018-01-11 | Stop reason: HOSPADM

## 2018-01-09 RX ORDER — MIDAZOLAM HYDROCHLORIDE 1 MG/ML
INJECTION INTRAMUSCULAR; INTRAVENOUS CODE/TRAUMA/SEDATION MEDICATION
Status: COMPLETED | OUTPATIENT
Start: 2018-01-09 | End: 2018-01-09

## 2018-01-09 RX ORDER — LIDOCAINE HYDROCHLORIDE AND EPINEPHRINE 10; 10 MG/ML; UG/ML
1 INJECTION, SOLUTION INFILTRATION; PERINEURAL ONCE
Status: COMPLETED | OUTPATIENT
Start: 2018-01-09 | End: 2018-01-09

## 2018-01-09 RX ORDER — SODIUM CHLORIDE 9 MG/ML
100 INJECTION, SOLUTION INTRAVENOUS CONTINUOUS
Status: DISPENSED | OUTPATIENT
Start: 2018-01-09 | End: 2018-01-09

## 2018-01-09 RX ORDER — ISOSORBIDE MONONITRATE 60 MG/1
60 TABLET, EXTENDED RELEASE ORAL 2 TIMES DAILY
Status: DISCONTINUED | OUTPATIENT
Start: 2018-01-09 | End: 2018-01-11 | Stop reason: HOSPADM

## 2018-01-09 RX ORDER — LIDOCAINE HYDROCHLORIDE 10 MG/ML
INJECTION, SOLUTION INFILTRATION; PERINEURAL CODE/TRAUMA/SEDATION MEDICATION
Status: COMPLETED | OUTPATIENT
Start: 2018-01-09 | End: 2018-01-09

## 2018-01-09 RX ORDER — FENTANYL CITRATE 50 UG/ML
INJECTION, SOLUTION INTRAMUSCULAR; INTRAVENOUS CODE/TRAUMA/SEDATION MEDICATION
Status: COMPLETED | OUTPATIENT
Start: 2018-01-09 | End: 2018-01-09

## 2018-01-09 RX ADMIN — ACETYLCYSTEINE 600 MG: 200 SOLUTION ORAL; RESPIRATORY (INHALATION) at 12:02

## 2018-01-09 RX ADMIN — METOPROLOL TARTRATE 25 MG: 25 TABLET ORAL at 08:02

## 2018-01-09 RX ADMIN — FENTANYL CITRATE 50 MCG: 50 INJECTION INTRAMUSCULAR; INTRAVENOUS at 09:33

## 2018-01-09 RX ADMIN — Medication 1 TABLET: at 11:56

## 2018-01-09 RX ADMIN — SODIUM CHLORIDE 100 ML/HR: 0.9 INJECTION, SOLUTION INTRAVENOUS at 03:51

## 2018-01-09 RX ADMIN — ATORVASTATIN CALCIUM 40 MG: 40 TABLET, FILM COATED ORAL at 17:10

## 2018-01-09 RX ADMIN — RANOLAZINE 1000 MG: 500 TABLET, FILM COATED, EXTENDED RELEASE ORAL at 08:01

## 2018-01-09 RX ADMIN — Medication 1 SPRAY: at 11:58

## 2018-01-09 RX ADMIN — MIDAZOLAM 1 MG: 1 INJECTION INTRAMUSCULAR; INTRAVENOUS at 09:42

## 2018-01-09 RX ADMIN — ACETYLCYSTEINE 600 MG: 200 SOLUTION ORAL; RESPIRATORY (INHALATION) at 17:15

## 2018-01-09 RX ADMIN — SODIUM CHLORIDE 100 ML/HR: 0.9 INJECTION, SOLUTION INTRAVENOUS at 11:58

## 2018-01-09 RX ADMIN — LIDOCAINE HYDROCHLORIDE 5 ML: 10 INJECTION, SOLUTION INFILTRATION; PERINEURAL at 09:42

## 2018-01-09 RX ADMIN — MIDAZOLAM 2 MG: 1 INJECTION INTRAMUSCULAR; INTRAVENOUS at 09:33

## 2018-01-09 RX ADMIN — FENTANYL CITRATE 25 MCG: 50 INJECTION INTRAMUSCULAR; INTRAVENOUS at 09:41

## 2018-01-09 RX ADMIN — CYANOCOBALAMIN TAB 500 MCG 1000 MCG: 500 TAB at 11:55

## 2018-01-09 RX ADMIN — RANOLAZINE 1000 MG: 500 TABLET, FILM COATED, EXTENDED RELEASE ORAL at 17:09

## 2018-01-09 RX ADMIN — IOHEXOL 70 ML: 350 INJECTION, SOLUTION INTRAVENOUS at 10:08

## 2018-01-09 RX ADMIN — VITAMIN D, TAB 1000IU (100/BT) 1000 UNITS: 25 TAB at 11:55

## 2018-01-09 RX ADMIN — METOPROLOL TARTRATE 50 MG: 50 TABLET ORAL at 21:04

## 2018-01-09 RX ADMIN — LIDOCAINE HYDROCHLORIDE,EPINEPHRINE BITARTRATE 1 ML: 10; .01 INJECTION, SOLUTION INFILTRATION; PERINEURAL at 15:06

## 2018-01-09 RX ADMIN — ASPIRIN 81 MG 81 MG: 81 TABLET ORAL at 08:02

## 2018-01-09 RX ADMIN — AMLODIPINE BESYLATE 2.5 MG: 2.5 TABLET ORAL at 08:02

## 2018-01-09 RX ADMIN — METOPROLOL TARTRATE 25 MG: 25 TABLET ORAL at 00:32

## 2018-01-09 RX ADMIN — ISOSORBIDE MONONITRATE 60 MG: 60 TABLET, EXTENDED RELEASE ORAL at 08:03

## 2018-01-09 RX ADMIN — ISOSORBIDE MONONITRATE 60 MG: 60 TABLET, EXTENDED RELEASE ORAL at 17:10

## 2018-01-09 RX ADMIN — CLOPIDOGREL BISULFATE 75 MG: 75 TABLET ORAL at 08:02

## 2018-01-09 RX ADMIN — Medication 1 SPRAY: at 21:03

## 2018-01-09 NOTE — PROGRESS NOTES
NEPHROLOGY PROGRESS NOTE   Mable Luque 74 y o  male MRN: [id number]  Unit/Bed#: [facility] 426-01 Encounter: [id number]      ASSESSMENT and PLAN:  1  HARRIET: resolved and was due to cardiorenal   2  CKD III: baseline creatinine 1 3-1 4  Creatinine today stable at 1 3   -s/p cardiac cath today with 70cc of dye    -getting mucomyst x 1 more dose today and on IVF at 100cc/h for 8 hours post cath    -check am BMP   3  Solitary kidney s/p nephrectomy   4  History bladder cancer: on chemotherapy   5  NSTEMI: s/p cardiac cath showing multivessel disease and just getting continued medical management   6  HTN: overall BP is acceptable         SUBJECTIVE:  Feeling ok  Currently laying flat after cardiac cath with no shortness of breath  OBJECTIVE:  Current Weight: Weight - Scale: 79 8 kg (175 lb 14 8 oz)  Vitals:    01/09/18 1300   BP: 130/62   Pulse: 66   Resp:    Temp:    SpO2:        Intake/Output Summary (Last 24 hours) at 01/09/18 1318  Last data filed at 01/09/18 1300   Gross per 24 hour   Intake              240 ml   Output              600 ml   Net             -360 ml     General: no acute distress   Skin: no rash  Eyes:  Sclera anicteric   ENT: moist mucous membranes   Neck: supple, symmetric   Chest: clear to auscultation    CVS: regular rate and rhythm  Abdomen:  Soft, non-tender, non-distended   Extremities: no edema    Neuro: awake and alert   Psych: appropriate affect    Medications:  Scheduled Meds:    acetylcysteine 600 mg Oral BID   aspirin 81 mg Oral Daily   atorvastatin 40 mg Oral QPM   cholecalciferol 1,000 Units Oral Daily   cyanocobalamin 1,000 mcg Oral Daily   isosorbide mononitrate 60 mg Oral BID   metoprolol tartrate 50 mg Oral Q12H [facility]   multivitamin-minerals 1 tablet Oral Daily   ranolazine 1,000 mg Oral BID       PRN Meds:  LORazepam    morphine injection    nitroglycerin    ondansetron    senna    sodium chloride    Continuous Infusions:    sodium chloride 100 mL/hr Last Rate: 100 mL/hr (01/09/18 1158)       Laboratory Results:  Lab Results   Component Value Date    WBC 18 42 (H) 01/09/2018    HGB 8 6 (L) 01/09/2018    HCT 26 0 (L) 01/09/2018     (H) 01/09/2018    PLT 25 (LL) 01/09/2018     Lab Results   Component Value Date    GLUCOSE 110 01/09/2018    CALCIUM 8 7 01/09/2018     01/09/2018    K 4 2 01/09/2018    CO2 24 01/09/2018     01/09/2018    BUN 19 01/09/2018    CREATININE 1 31 (H) 01/09/2018     Lab Results   Component Value Date    CALCIUM 8 7 01/09/2018    PHOS 2 7 01/06/2018

## 2018-01-09 NOTE — PROGRESS NOTES
Progress Note - Cardiology   Caitlyn Cornejo 74 y o  male MRN: [id number]  Encounter: [id number]  01/09/18  11:45 AM        Assessment/Plan:    1  Recurrent angina now with NSTEMI type II  On aspirin, Plavix, statin, beta blocker, Imdur 60 and Ranexa 1000 bid  Had cath 1/9/18, no new disease, unable to intervene on occluded distal OM and occluded D1 which are likely cause of his angina  Recommended continued medical management  Will change Imdur 60 to bid, and switch Metoprolol to 50 bid  Given his thrombocytopenia, will stop Plavix  2  HTN  BP controlled with Metoprolol, Imdur, Lasix 20 and Amlodipine 2 5  Monitor with increasing Imdur, will stop Amlodipine in favor of Imdur given angina  Lasix held for cath, restart if creatinine remains stable  3  HARRIET on CKD III  Creatinine stable, improved from peak of 1 6, on Lasix 20 PO daily  Baseline 1 3-1 4  Held Lasix in prep for cath, recheck BMP in AM      4  HL  On Atorvastatin 40, LDL at goal      Follows with Dr Lacy Bravo as outpt  Subjective/Objective   Chief Complaint: No chief complaint on file  Subjective: 74year old man with a history of CAD s/p CABG, HTN, HL, bladder cancer undergoing chemotherapy, CKD III s/p nephrectomy for RCC, thrombocytopenia, who was admitted with recurrent exertional angina at [facility], troponin elevation concerning for NSTEMI, who was transferred to [facility] for possible high risk cardiac catheterization/PCI of known LCX disease which is likely causing his symptoms, despite maximal medical management  Cardiac catheterization 1/9/18 showed LIMA and SVG to distal RCA patent, has chronically occluded OM and D1  No PCI interventions available, recommended continued medical therapy  No current chest pain or SOB  No LE edema  No palpitations, dizziness, or lightheadedness  No fevers  No issues with bleeding currently      Patient Active Problem List   Diagnosis    Chest pain    Leukocytosis    Chronic anemia    [facility]    Chronic stable angina ([facility] )    Dyspnea    CAD (coronary artery disease)    Hyperlipidemia    RBBB    Transitional cell bladder cancer ([facility] )    MI (myocardial infarction)     Past Medical History:   Diagnosis Date    Anesthesia     "can't urinate after surgery"    Basal cell carcinoma     Coronary artery disease     Essential tremor     of head and hands bilat    Hearing aid worn     bilat    History of kidney stones     History of pneumonia     childhood    History of prostate cancer     Hx of radiation therapy     2007 for after prostate surgery    Hyperlipidemia     Hypertension     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 ([facility] )     and" left kidney and left ureter removed"    Urinary incontinence     wears Depends    Wears glasses     Wears partial dentures     lower       Allergies   Allergen Reactions    Levaquin [Levofloxacin] Rash    Percocet [Oxycodone-Acetaminophen] GI Intolerance     Constipation,,,happens with narcotics like percocet       Current Facility-Administered Medications   Medication Dose Route Frequency Provider Last Rate Last Dose    acetylcysteine (MUCOMYST) 200 mg/mL oral solution 600 mg  600 mg Oral BID Jaden Del Angel MD   600 mg at 01/08/18 1820    amLODIPine (NORVASC) tablet 2 5 mg  2 5 mg Oral Daily Hetul Soliz, DO   2 5 mg at 01/09/18 0802    aspirin chewable tablet 81 mg  81 mg Oral Daily Hetul Soliz, DO   81 mg at 01/09/18 0802    atorvastatin (LIPITOR) tablet 40 mg  40 mg Oral QPM Hetul Soliz, DO   40 mg at 01/08/18 1819    cholecalciferol (VITAMIN D3) tablet 1,000 Units  1,000 Units Oral Daily Hetul Soliz, DO   1,000 Units at 01/08/18 0953    cyanocobalamin (VITAMIN B-12) tablet 1,000 mcg  1,000 mcg Oral Daily Hetul Soliz, DO   1,000 mcg at 01/08/18 0953    isosorbide mononitrate (IMDUR) 24 hr tablet 60 mg  60 mg Oral Daily Tiarra Malagon MD   60 mg at 01/09/18 0803    LORazepam (ATIVAN) tablet 0 5 mg  0 5 mg Oral Q8H PRN Hetul Soliz, DO        metoprolol tartrate (LOPRESSOR) tablet 25 mg  25 mg Oral Q8H Hetul Soliz, DO   25 mg at 01/09/18 0802    morphine injection 1 mg  1 mg Intravenous Q4H PRN Hetul Soliz, DO        multivitamin-minerals (CENTRUM) tablet 1 tablet  1 tablet Oral Daily Hetul Soliz, DO   1 tablet at 01/08/18 0953    nitroglycerin (NITROSTAT) SL tablet 0 4 mg  0 4 mg Sublingual Q5 Min PRN Hetul Soliz, DO        ondansetron (ZOFRAN) injection 4 mg  4 mg Intravenous Q6H PRN Hetul Soliz, DO        ranolazine (RANEXA) 12 hr tablet 1,000 mg  1,000 mg Oral BID Vanesa Schwarz MD   1,000 mg at 01/09/18 0801    senna (SENOKOT) tablet 8 6 mg  1 tablet Oral HS PRN Hetul Soliz, DO        sodium chloride (OCEAN) 0 65 % nasal spray 1 spray  1 spray Each Nare Q1H PRN Gemma Toney MD   1 spray at 01/08/18 1603    sodium chloride 0 9 % infusion  100 mL/hr Intravenous Continuous Shari Georges MD           Vitals: /72 Comment: Map 101  Pulse 67   Temp 98 1 °F (36 7 °C) (Oral)   Resp 18   Ht 5' 10" (1 778 m)   Wt 79 8 kg (175 lb 14 8 oz)   SpO2 95%   BMI 25 24 kg/m²     Intake/Output Summary (Last 24 hours) at 01/09/18 1145  Last data filed at 01/08/18 2255   Gross per 24 hour   Intake              240 ml   Output              300 ml   Net              -60 ml     Wt Readings from Last 3 Encounters:   01/06/18 79 8 kg (175 lb 14 8 oz)   01/05/18 77 1 kg (170 lb)   07/03/17 77 6 kg (171 lb)       Body mass index is 25 24 kg/m²  ,     Vitals:    01/08/18 1941 01/08/18 2344 01/09/18 0325 01/09/18 0741   BP: 130/62 151/72 123/75 141/72   Pulse: 80 78 67 67   Patient Position - Orthostatic VS:  Lying Sitting Lying       Physical Exam:     GEN: Awake and alert, in no acute distress, lying in bed  HEENT: Sclera anicteric, conjunctivae pink, mucous membranes moist   NECK: Supple, no carotid bruits, no significant JVD  HEART: Regular rhythm, normal S1 and S2, no murmurs, clicks, gallops or rubs     LUNGS: Clear to auscultation bilaterally; no wheezes, rales, or rhonchi   ABDOMEN: Soft, nontender, nondistended, normoactive bowel sounds  EXTREMITIES: Skin warm and well perfused, no clubbing, cyanosis, or edema  NEURO: No focal findings  SKIN: Normal without suspicious lesions on exposed skin        Lab Results:     BMP:    Results from last 7 days  Lab Units 01/09/18  0502 01/08/18  0450 01/07/18  0504 01/06/18  0417 01/05/18  2042   SODIUM mmol/L 136 136 138 139 139   POTASSIUM mmol/L 4 2 4 3 4 7 4 7 4 8   CHLORIDE mmol/L 102 102 105 108 104   CO2 mmol/L 24 27 26 22 27   BUN mg/dL 19 21 18 22 26*   CREATININE mg/dL 1 31* 1 25 1 24 1 37* 1 68*   GLUCOSE RANDOM mg/dL 110 110 101 114 213*   CALCIUM mg/dL 8 7 8 6 8 5 8 2* 8 9       CBC:     Results from last 7 days  Lab Units 01/09/18  0502 01/08/18  0450 01/07/18  0504 01/06/18  0417 01/06/18  0042 01/05/18  2042   WBC Thousand/uL 18 42* 22 29* 23 00* 21 56*  --  24 25*   HEMOGLOBIN g/dL 8 6* 8 6* 8 6* 8 9*  --  9 7*   HEMATOCRIT % 26 0* 26 7* 26 2* 28 0*  --  30 8*   MCV fL 102* 103* 104* 105*  --  106*   PLATELETS Thousands/uL 25* 36* 41* 38* 42* 58*   MCH pg 33 7 33 1 34 0 33 5  --  33 4   MCHC g/dL 33 1 32 2 32 8 31 8  --  31 5   RDW % 20 6* 20 8* 20 8* 20 7*  --  20 5*   MPV fL 10 8 10 8 11 0 10 8 11 2 9 7   NRBC AUTO /100 WBCs 0 0 0  --   --   --          Results from last 7 days  Lab Units 01/06/18  0700 01/06/18  0405 01/06/18  0042   TROPONIN I ng/mL 5 20* 5 81* 3 24*          Results from last 7 days  Lab Units 01/06/18  0417   MAGNESIUM mg/dL 1 7       INR:     Results from last 7 days  Lab Units 01/09/18  0502 01/06/18  0042   INR  1 09 1 12       Lipid Profile:   Lab Results   Component Value Date    CHOL 103 01/06/2018     Lab Results   Component Value Date    HDL 34 (L) 01/06/2018     Lab Results   Component Value Date    LDLCALC 44 01/06/2018     Lab Results   Component Value Date    TRIG 124 01/06/2018       Telemetry: personally reviewed, SR, no events

## 2018-01-09 NOTE — PROGRESS NOTES
Called by nurse to bedside for some bleeding/using from the right femoral arterial site  Nurse and fellow previously both held groin of x2 with continued oozing  Right femoral arterial access ice prepped and injected with 20 mL of 1% lidocaine with 100,0000 units of epinephrine  Light pressure held for 5 minutes post   Bleeding and oozing stopped   Pressure dressing applied  Patient to remain on bedrest for the next 4 hours  Nurses  instructed not to remove the pressure dressing unless the bleeding resumes  Jesus Alva

## 2018-01-09 NOTE — PROGRESS NOTES
[facility] Internal Medicine Progress Note  Patient: Carolyn Araujo 74 y o  male   MRN: [id number]  PCP: Manuel Díaz MD  Unit/Bed#: [facility] 426-01 Encounter: [id number]  Date Of Visit: 01/09/18    Assessment:    Principal Problem:    MI (myocardial infarction)  Active Problems:    HARRIET (acute kidney injury) ([facility] )    Chronic stable angina ([facility] )    Hyperlipidemia    Transitional cell bladder cancer ([facility] )      Plan:    · NSTEMI, Type 1 with Recurrent Angina -   · Antiplatelet - Aspirin / Plavix, but plavix held pre-cath  · Beta Blocker - Lopressor  · Statin - atorvastatin  · Nitrate - Imdur  · Other - Ranexa  · Procedure - cardiac catheterization 1/9/2018 - will follow up official report, but no interventions needed  · Pressure dressings to the cath site  Monitor for bleeding  · Follows with Dr Kirill Mercado as outpatient  · CKD III (baseline Cr 1 3 - 1 4) with HARRIET with Solitary Kidney -   · Mucomyst was given for cath prep  IVF for 8 hours post cath  · Will need to monitor renal function following cath now for up to 48 hours to assure stability  · BMP in am   · Thrombocytopenia - seen by hematology this admission and felt Ok to proceed with cardiac cath, though risk is increased for this patient  CBCD in am   · Anemia - transfuse when hemoglobin < 8 given cardiac disease  · Bladder Cancer -   · History - diagnosed with prostate cancer > 10 years ago  Had surgery and radiation  Then left nephrectomy in 2/2017 for cancer  Diagnosed with Cancer of Urinary Bladder and  Now on Gemzar Chemotherapy which is the likely cause of the thrombocytopenia and anemia  · As outpatient, his oncologist is Dr Marco A [facility] at [facility]   · Essential Hypertension - blood pressures overall controlled  maintain current regimen  · Hyperlipidemia - maintain atorvastatin    LDL at goal        VTE Pharmacologic Prophylaxis:   Pharmacologic: Pharmacologic VTE Prophylaxis contraindicated due to thrombocytopenia  Mechanical VTE Prophylaxis in Place: Yes    Patient Centered Rounds: I have performed bedside rounds with nursing staff today  Discussions with Specialists or Other Care Team Provider: None    Education and Discussions with Family / Patient: Wife present at bedside during my visit  Time Spent for Care: 30 minutes  More than 50% of total time spent on counseling and coordination of care as described above  Current Length of Stay: 3 day(s)    Current Patient Status: Inpatient   Certification Statement: The patient will continue to require additional inpatient hospital stay due to evaluations and treatments for heart failure as above  Discharge Plan / Estimated Discharge Date: next 24 - more likely 48 hours dependent on renal function and platelet / hemoglobin numbers  Code Status: Level 2 - DNAR: but accepts endotracheal intubation      Subjective: The patient denies any chest pain or groin pain  He has no shortness of breath currently  The patient has had some bleeding at cath site twice per nursing  Now has had epinephrine injection locally and has pressure dressing  Objective:     Vitals:   Temp (24hrs), Av 3 °F (36 8 °C), Min:98 °F (36 7 °C), Max:98 8 °F (37 1 °C)    HR:  [65-80] 66  Resp:  [18] 18  BP: (111-151)/(59-75) 130/62  SpO2:  [93 %-95 %] 95 %  Body mass index is 25 24 kg/m²  Input and Output Summary (last 24 hours): Intake/Output Summary (Last 24 hours) at 18 1358  Last data filed at 18 1300   Gross per 24 hour   Intake              240 ml   Output              600 ml   Net             -360 ml       Physical Exam:     Physical Exam   Constitutional: No distress  HENT:   Mouth/Throat: Oropharynx is clear and moist  No oropharyngeal exudate  Eyes: Conjunctivae are normal  Pupils are equal, round, and reactive to light  Neck: No JVD present  Cardiovascular: Normal rate and regular rhythm  No murmur heard    Pulmonary/Chest: Effort normal and breath sounds normal  He has no wheezes  He has no rales  Auscultated anteriorly only today  Abdominal: Soft  Bowel sounds are normal  He exhibits no distension  There is no tenderness  Musculoskeletal:   Pressure dressing over right groin cath site  No bleeding currently observed  Pulses intact distally and no cyanosis  No mottling  Warm perfused lower extremities bilaterally  No calf tenderness or edema  Lymphadenopathy:     He has no cervical adenopathy  Neurological: He is alert  No cranial nerve deficit  Generally oriented  Skin: No rash noted  Vitals reviewed  Additional Data:     Labs:      Results from last 7 days  Lab Units 01/09/18  0502   WBC Thousand/uL 18 42*   HEMOGLOBIN g/dL 8 6*   HEMATOCRIT % 26 0*   PLATELETS Thousands/uL 25*   LYMPHO PCT % 2*   MONO PCT MAN % 6   EOSINO PCT MANUAL % 1       Results from last 7 days  Lab Units 01/09/18  0502   SODIUM mmol/L 136   POTASSIUM mmol/L 4 2   CHLORIDE mmol/L 102   CO2 mmol/L 24   BUN mg/dL 19   CREATININE mg/dL 1 31*   CALCIUM mg/dL 8 7   TOTAL PROTEIN g/dL 7 0   BILIRUBIN TOTAL mg/dL 0 51   ALK PHOS U/L 134*   ALT U/L 74   AST U/L 47*   GLUCOSE RANDOM mg/dL 110       Results from last 7 days  Lab Units 01/09/18  0502   INR  1 09       * I Have Reviewed All Lab Data Listed Above  * Additional Pertinent Lab Tests Reviewed:  [facility] Admission Reviewed    Imaging:    Imaging Reports Reviewed Today Include: Chest Xray  Imaging Personally Reviewed by Myself Includes:  None    Recent Cultures (last 7 days):       Results from last 7 days  Lab Units 01/06/18  0117   URINE CULTURE  >100,000 cfu/ml Klebsiella pneumoniae*       Last 24 Hours Medication List:     acetylcysteine 600 mg Oral BID   aspirin 81 mg Oral Daily   atorvastatin 40 mg Oral QPM   cholecalciferol 1,000 Units Oral Daily   cyanocobalamin 1,000 mcg Oral Daily   isosorbide mononitrate 60 mg Oral BID   metoprolol tartrate 50 mg Oral Q12H [address]   multivitamin-minerals 1 tablet Oral Daily   ranolazine 1,000 mg Oral BID        Today, Patient Was Seen By: Karyn Bah DO    ** Please Note: This note has been constructed using a voice recognition system   **

## 2018-01-10 ENCOUNTER — GENERIC CONVERSION - ENCOUNTER (OUTPATIENT)
Dept: OTHER | Facility: OTHER | Age: 73
End: 2018-01-10

## 2018-01-10 LAB
ANION GAP SERPL CALCULATED.3IONS-SCNC: 9 MMOL/L (ref 4–13)
ANISOCYTOSIS BLD QL SMEAR: PRESENT
BASOPHILS # BLD MANUAL: 0 THOUSAND/UL (ref 0–0.1)
BASOPHILS NFR MAR MANUAL: 0 % (ref 0–1)
BUN SERPL-MCNC: 19 MG/DL (ref 5–25)
CALCIUM SERPL-MCNC: 8.9 MG/DL (ref 8.3–10.1)
CHLORIDE SERPL-SCNC: 103 MMOL/L (ref 100–108)
CO2 SERPL-SCNC: 25 MMOL/L (ref 21–32)
CREAT SERPL-MCNC: 1.22 MG/DL (ref 0.6–1.3)
EOSINOPHIL # BLD MANUAL: 0 THOUSAND/UL (ref 0–0.4)
EOSINOPHIL NFR BLD MANUAL: 0 % (ref 0–6)
ERYTHROCYTE [DISTWIDTH] IN BLOOD BY AUTOMATED COUNT: 20.3 % (ref 11.6–15.1)
GFR SERPL CREATININE-BSD FRML MDRD: 58 ML/MIN/1.73SQ M
GLUCOSE SERPL-MCNC: 108 MG/DL (ref 65–140)
HCT VFR BLD AUTO: 25.1 % (ref 36.5–49.3)
HGB BLD-MCNC: 8.2 G/DL (ref 12–17)
LYMPHOCYTES # BLD AUTO: 0.76 THOUSAND/UL (ref 0.6–4.47)
LYMPHOCYTES # BLD AUTO: 3 % (ref 14–44)
MCH RBC QN AUTO: 32.9 PG (ref 26.8–34.3)
MCHC RBC AUTO-ENTMCNC: 32.7 G/DL (ref 31.4–37.4)
MCV RBC AUTO: 101 FL (ref 82–98)
METAMYELOCYTES NFR BLD MANUAL: 3 % (ref 0–1)
MONOCYTES # BLD AUTO: 0.76 THOUSAND/UL (ref 0–1.22)
MONOCYTES NFR BLD: 3 % (ref 4–12)
NEUTROPHILS # BLD MANUAL: 22.61 THOUSAND/UL (ref 1.85–7.62)
NEUTS BAND NFR BLD MANUAL: 2 % (ref 0–8)
NEUTS SEG NFR BLD AUTO: 87 % (ref 43–75)
NRBC BLD AUTO-RTO: 0 /100 WBCS
PLATELET # BLD AUTO: 24 THOUSANDS/UL (ref 149–390)
PLATELET BLD QL SMEAR: ABNORMAL
PMV BLD AUTO: 11.5 FL (ref 8.9–12.7)
POLYCHROMASIA BLD QL SMEAR: PRESENT
POTASSIUM SERPL-SCNC: 4.6 MMOL/L (ref 3.5–5.3)
RBC # BLD AUTO: 2.49 MILLION/UL (ref 3.88–5.62)
RBC MORPH BLD: PRESENT
SODIUM SERPL-SCNC: 137 MMOL/L (ref 136–145)
TOXIC GRANULES BLD QL SMEAR: PRESENT
VARIANT LYMPHS # BLD AUTO: 2 %
WBC # BLD AUTO: 25.4 THOUSAND/UL (ref 4.31–10.16)

## 2018-01-10 PROCEDURE — P9037 PLATE PHERES LEUKOREDU IRRAD: HCPCS

## 2018-01-10 PROCEDURE — 30233R1 TRANSFUSION OF NONAUTOLOGOUS PLATELETS INTO PERIPHERAL VEIN, PERCUTANEOUS APPROACH: ICD-10-PCS | Performed by: INTERNAL MEDICINE

## 2018-01-10 PROCEDURE — 85007 BL SMEAR W/DIFF WBC COUNT: CPT | Performed by: INTERNAL MEDICINE

## 2018-01-10 PROCEDURE — 85027 COMPLETE CBC AUTOMATED: CPT | Performed by: INTERNAL MEDICINE

## 2018-01-10 PROCEDURE — 80048 BASIC METABOLIC PNL TOTAL CA: CPT | Performed by: STUDENT IN AN ORGANIZED HEALTH CARE EDUCATION/TRAINING PROGRAM

## 2018-01-10 RX ORDER — ACETAMINOPHEN 325 MG/1
650 TABLET ORAL ONCE
Status: COMPLETED | OUTPATIENT
Start: 2018-01-10 | End: 2018-01-10

## 2018-01-10 RX ORDER — FUROSEMIDE 20 MG/1
20 TABLET ORAL DAILY
Status: DISCONTINUED | OUTPATIENT
Start: 2018-01-10 | End: 2018-01-11 | Stop reason: HOSPADM

## 2018-01-10 RX ADMIN — METOPROLOL TARTRATE 50 MG: 50 TABLET ORAL at 10:15

## 2018-01-10 RX ADMIN — ACETAMINOPHEN 650 MG: 325 TABLET, FILM COATED ORAL at 16:33

## 2018-01-10 RX ADMIN — ISOSORBIDE MONONITRATE 60 MG: 60 TABLET, EXTENDED RELEASE ORAL at 18:34

## 2018-01-10 RX ADMIN — VITAMIN D, TAB 1000IU (100/BT) 1000 UNITS: 25 TAB at 10:16

## 2018-01-10 RX ADMIN — ASPIRIN 81 MG 81 MG: 81 TABLET ORAL at 10:15

## 2018-01-10 RX ADMIN — ATORVASTATIN CALCIUM 40 MG: 40 TABLET, FILM COATED ORAL at 18:34

## 2018-01-10 RX ADMIN — FUROSEMIDE 20 MG: 20 TABLET ORAL at 16:33

## 2018-01-10 RX ADMIN — ISOSORBIDE MONONITRATE 60 MG: 60 TABLET, EXTENDED RELEASE ORAL at 10:17

## 2018-01-10 RX ADMIN — RANOLAZINE 1000 MG: 500 TABLET, FILM COATED, EXTENDED RELEASE ORAL at 18:33

## 2018-01-10 RX ADMIN — Medication 1 TABLET: at 10:16

## 2018-01-10 RX ADMIN — RANOLAZINE 1000 MG: 500 TABLET, FILM COATED, EXTENDED RELEASE ORAL at 10:16

## 2018-01-10 RX ADMIN — METOPROLOL TARTRATE 50 MG: 50 TABLET ORAL at 20:50

## 2018-01-10 RX ADMIN — CYANOCOBALAMIN TAB 500 MCG 1000 MCG: 500 TAB at 10:16

## 2018-01-10 NOTE — CASE MANAGEMENT
Continued Stay Review    Date: 1/10/2018    Vital Signs: /76   Pulse 76   Temp 99 5 °F (37 5 °C) (Oral)   Resp 18   Ht 5' 10" (1 778 m)   Wt 79 8 kg (175 lb 14 8 oz)   SpO2 95%   BMI 25 24 kg/m²     Medications:   Scheduled Meds:   acetaminophen 650 mg Oral Once   aspirin 81 mg Oral Daily   atorvastatin 40 mg Oral QPM   cholecalciferol 1,000 Units Oral Daily   cyanocobalamin 1,000 mcg Oral Daily   furosemide 20 mg Oral Daily   isosorbide mononitrate 60 mg Oral BID   metoprolol tartrate 50 mg Oral Q12H [facility]   multivitamin-minerals 1 tablet Oral Daily   ranolazine 1,000 mg Oral BID     Continuous Infusions:    PRN Meds: LORazepam    morphine injection    nitroglycerin    ondansetron    senna    sodium chloride    Abnormal Labs/Diagnostic Results:     Age/Sex: 74 y o  male     Assessment/Plan:    Non-ST elevation myocardial infarction (NSTEMI), type 2 (HCC)  Active Problems:    HARRIET (acute kidney injury) ([facility] )    Chronic stable angina (HCC)    Hyperlipidemia    Transitional cell bladder cancer ([facility] )        Plan:     · NSTEMI, Type 1 with Recurrent Angina -   ? Antiplatelet - Aspirin / Plavix, but plavix held pre-cath  ? Beta Blocker - Lopressor  ? Statin - atorvastatin  ? Nitrate - Imdur  ? Other - Ranexa  ? Procedure - cardiac catheterization 1/9/2018 by Dr Castillo Offer -  multi vessel CAD was noted with graft patent to the LAD and distal RCA  Symptoms and demand ischemia are most likely due to occluded distal OM and occluded D 1, but these are not suitable for percutaneous revascularization and medical therapy is indicated  Cardiology suggested that additional episodes of ischemia with moderate troponin release are likely in the future but invasive study should be probably best reserved for instances of objective evidence of anterior or inferior ischemia  ? Monitor cath site for any bleeding  ? Follows with Dr Mary Amos as outpatient    · CKD III (baseline Cr 1 3 - 1 4) with HARRIET with Solitary Kidney - ? Mucomyst was given for cath prep  ? BMP in am   · Thrombocytopenia - Platelet count is 24 k  Discussed with Hematology who recommends transfusion of platelets  Will give 2 units leuko-reduced irradiated platelets  CBC in AM   · Anemia - transfuse when hemoglobin < 8 given cardiac disease  · Bladder Cancer -   ? History - diagnosed with prostate cancer > 10 years ago  Had surgery and radiation  Then left nephrectomy in 2/2017 for cancer  Diagnosed with Cancer of Urinary Bladder and  Now on Gemzar Chemotherapy which is the likely cause of the thrombocytopenia and anemia  ? As outpatient, his oncologist is [facility] Robb [facility] at [facility]   · Essential Hypertension - blood pressures overall controlled  maintain current regimen  · Hyperlipidemia - maintain atorvastatin    LDL at goal         VTE Pharmacologic Prophylaxis:   Pharmacologic: Pharmacologic VTE Prophylaxis contraindicated due to thrombocytopenia  Mechanical VTE Prophylaxis in Place: Yes  Current Length of Stay: 4 day(s)     Current Patient Status: Inpatient   Certification Statement: The patient will continue to require additional inpatient hospital stay due to evaluations and treatments for heart failure as above      Discharge Plan / Estimated Discharge Date: Likely discharge by tomorrow

## 2018-01-10 NOTE — PROGRESS NOTES
Progress Note - Cardiology   Noni Barker 74 y o  male MRN: [id number]  Encounter: [id number]  01/10/18  11:01 AM        Assessment/Plan:    1  Recurrent angina now with NSTEMI type II  On aspirin, Plavix, statin, beta blocker, Imdur 60 and Ranexa 1000 bid  Had cath 1/9/18, no new disease, unable to intervene on occluded distal OM and occluded D1 which are likely cause of his angina  Recommended continued medical management  Changed Imdur 60 to bid, and switched Metoprolol to 50 bid  Given his thrombocytopenia, stopped Plavix  Continue aspirin if able  2  HTN  BP controlled with Metoprolol, Imdur, Lasix 20  Amlodipine stopped in favor of titrating up Imdur  3  HARRIET on CKD III  Creatinine stable, improved from peak of 1 6, restart Lasix 20 PO daily  Baseline 1 3-1 4  4  HL  On Atorvastatin 40, LDL at goal      Follows with Dr Mary Amos as outpt  Stable for discharge from cardiac standpoint  Subjective/Objective   Chief Complaint: No chief complaint on file  Subjective: 74year old man with a history of CAD s/p CABG, HTN, HL, bladder cancer undergoing chemotherapy, CKD III s/p nephrectomy for RCC, thrombocytopenia, who was admitted with recurrent exertional angina at [facility], troponin elevation concerning for NSTEMI, who was transferred to [facility] for possible high risk cardiac catheterization/PCI of known LCX disease which is likely causing his symptoms, despite maximal medical management  Cardiac catheterization 1/9/18 showed LIMA and SVG to distal RCA patent, has chronically occluded OM and D1  No PCI interventions available, recommended continued medical therapy  No current chest pain or SOB  No LE edema  No palpitations, dizziness, or lightheadedness  No fevers  Has some intermittent epistaxis  Had some groin bleeding overnight, currently with pressure dressing over site       Patient Active Problem List   Diagnosis    Chest pain    Leukocytosis    Chronic anemia    HARRIET [facility]acute kidney injury) ([facility] )    Chronic stable angina ([place] [address] )    Dyspnea    CAD (coronary artery disease)    Hyperlipidemia    RBBB    Transitional cell bladder cancer ([place] [address] )    Non-ST elevation myocardial infarction (NSTEMI), type 2 ([place] [address] )     Past Medical History:   Diagnosis Date    Anesthesia     "can't urinate after surgery"    Basal cell carcinoma     Coronary artery disease     Essential tremor     of head and hands bilat    Hearing aid worn     bilat    History of kidney stones     History of pneumonia     childhood    History of prostate cancer     Hx of radiation therapy     2007 for after prostate surgery    Hyperlipidemia     Hypertension     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 ([place] [address] )     and" left kidney and left ureter removed"    Urinary incontinence     wears Depends    Wears glasses     Wears partial dentures     lower       Allergies   Allergen Reactions    Levaquin [Levofloxacin] Rash    Percocet [Oxycodone-Acetaminophen] GI Intolerance     Constipation,,,happens with narcotics like percocet       Current Facility-Administered Medications   Medication Dose Route Frequency Provider Last Rate Last Dose    aspirin chewable tablet 81 mg  81 mg Oral Daily Hetul Soliz, DO   81 mg at 01/10/18 1015    atorvastatin (LIPITOR) tablet 40 mg  40 mg Oral QPM Hetul Soliz, DO   40 mg at 01/09/18 1710    cholecalciferol (VITAMIN D3) tablet 1,000 Units  1,000 Units Oral Daily Hetul Soliz, DO   1,000 Units at 01/10/18 1016    cyanocobalamin (VITAMIN B-12) tablet 1,000 mcg  1,000 mcg Oral Daily Hetul Soliz, DO   1,000 mcg at 01/10/18 1016    isosorbide mononitrate (IMDUR) 24 hr tablet 60 mg  60 mg Oral BID Ebony Martines MD   60 mg at 01/10/18 1017    LORazepam (ATIVAN) tablet 0 5 mg  0 5 mg Oral Q8H PRN Hetul Soliz, DO        metoprolol tartrate (LOPRESSOR) tablet 50 mg  50 mg Oral Q12H STEPHANIE Ebony Martines MD   50 mg at 01/10/18 1015    morphine injection 1 mg  1 mg Intravenous Q4H PRN Hetul Ernesto Meade,         multivitamin-minerals (CENTRUM) tablet 1 tablet  1 tablet Oral Daily Hetul Soliz, DO   1 tablet at 01/10/18 1016    nitroglycerin (NITROSTAT) SL tablet 0 4 mg  0 4 mg Sublingual Q5 Min PRN Hetul Soliz, DO        ondansetron (ZOFRAN) injection 4 mg  4 mg Intravenous Q6H PRN Hetul Soliz, DO        ranolazine (RANEXA) 12 hr tablet 1,000 mg  1,000 mg Oral BID Elizabeth Gillette MD   1,000 mg at 01/10/18 1016    senna (SENOKOT) tablet 8 6 mg  1 tablet Oral HS PRN Hetul Soliz, DO        sodium chloride (OCEAN) 0 65 % nasal spray 1 spray  1 spray Each Nare Q1H PRN Rene Abrams MD   1 spray at 01/09/18 2103       Vitals: /76   Pulse 76   Temp 97 7 °F (36 5 °C) (Oral)   Resp 18   Ht 5' 10" (1 778 m)   Wt 79 8 kg (175 lb 14 8 oz)   SpO2 93%   BMI 25 24 kg/m²     Intake/Output Summary (Last 24 hours) at 01/10/18 1101  Last data filed at 01/10/18 1001   Gross per 24 hour   Intake             2220 ml   Output             1175 ml   Net             1045 ml     Wt Readings from Last 3 Encounters:   01/06/18 79 8 kg (175 lb 14 8 oz)   01/05/18 77 1 kg (170 lb)   07/03/17 77 6 kg (171 lb)       Body mass index is 25 24 kg/m²  ,     Vitals:    01/09/18 2103 01/10/18 0008 01/10/18 0341 01/10/18 0749   BP: 139/68 128/71 130/72 150/76   Pulse: 80 73 67 76   Patient Position - Orthostatic VS:  Lying Lying Lying       Physical Exam:     GEN: Awake and alert, in no acute distress, lying in bed  HEENT: Sclera anicteric, conjunctivae pink, mucous membranes moist   NECK: Supple, no carotid bruits, no significant JVD  HEART: Regular rhythm, normal S1 and S2, no murmurs, clicks, gallops or rubs  LUNGS: Clear to auscultation bilaterally; no wheezes, rales, or rhonchi   ABDOMEN: Soft, nontender, nondistended, normoactive bowel sounds  EXTREMITIES: Skin warm and well perfused, no clubbing, cyanosis, or edema  Pressure dressing over right groin cath site  NEURO: No focal findings     SKIN: Normal without suspicious lesions on exposed skin  Lab Results:     BMP:    Results from last 7 days  Lab Units 01/10/18  0632 01/09/18  0502 01/08/18  0450 01/07/18  0504 01/06/18  0417 01/05/18  2042   SODIUM mmol/L 137 136 136 138 139 139   POTASSIUM mmol/L 4 6 4 2 4 3 4 7 4 7 4 8   CHLORIDE mmol/L 103 102 102 105 108 104   CO2 mmol/L 25 24 27 26 22 27   BUN mg/dL 19 19 21 18 22 26*   CREATININE mg/dL 1 22 1 31* 1 25 1 24 1 37* 1 68*   GLUCOSE RANDOM mg/dL 108 110 110 101 114 213*   CALCIUM mg/dL 8 9 8 7 8 6 8 5 8 2* 8 9       CBC:     Results from last 7 days  Lab Units 01/10/18  0632 01/09/18  0502 01/08/18  0450 01/07/18  0504 01/06/18  0417 01/06/18  0042 01/05/18  2042   WBC Thousand/uL 25 40* 18 42* 22 29* 23 00* 21 56*  --  24 25*   HEMOGLOBIN g/dL 8 2* 8 6* 8 6* 8 6* 8 9*  --  9 7*   HEMATOCRIT % 25 1* 26 0* 26 7* 26 2* 28 0*  --  30 8*   MCV fL 101* 102* 103* 104* 105*  --  106*   PLATELETS Thousands/uL 24* 25* 36* 41* 38* 42* 58*   MCH pg 32 9 33 7 33 1 34 0 33 5  --  33 4   MCHC g/dL 32 7 33 1 32 2 32 8 31 8  --  31 5   RDW % 20 3* 20 6* 20 8* 20 8* 20 7*  --  20 5*   MPV fL 11 5 10 8 10 8 11 0 10 8 11 2 9 7   NRBC AUTO /100 WBCs 0 0 0 0  --   --   --          Results from last 7 days  Lab Units 01/06/18  0700 01/06/18  0405 01/06/18  0042   TROPONIN I ng/mL 5 20* 5 81* 3 24*          Results from last 7 days  Lab Units 01/06/18  0417   MAGNESIUM mg/dL 1 7       INR:     Results from last 7 days  Lab Units 01/09/18  0502 01/06/18  0042   INR  1 09 1 12       Lipid Profile:   Lab Results   Component Value Date    CHOL 103 01/06/2018     Lab Results   Component Value Date    HDL 34 (L) 01/06/2018     Lab Results   Component Value Date    LDLCALC 44 01/06/2018     Lab Results   Component Value Date    TRIG 124 01/06/2018       Telemetry: personally reviewed, SR, no events

## 2018-01-10 NOTE — RESULT NOTES
Verified Results  (1) URINE CULTURE [id number] 07:17PM Belinda Longoria     Test Name Result Flag Reference   CLINICAL REPORT (Report)     Test:        Urine culture  Specimen Type:   Urine  Specimen Date:   6/27/2017 7:17 PM  Result Date:    6/28/2017 3:49 PM  Result Status:   Final result  Resulting Lab:   [facility] [address] [place] [address]            Tel: [phone]      CULTURE                                       ------------------                                   No Growth <1000 cfu/mL

## 2018-01-10 NOTE — PROGRESS NOTES
NEPHROLOGY PROGRESS NOTE   Santosh Melendez 74 y o  male MRN: [id number]  Unit/Bed#: [facility] 426-01 Encounter: [id number]      ASSESSMENT and PLAN:  1  HARRIET: resolved and was due to cardiorenal   2  CKD III: baseline creatinine 1 3-1 4  Creatinine today stable at 1 2   -s/p cardiac cath yesterday with 70cc of dye    -continue to trend bmp   3  Solitary kidney s/p nephrectomy   4  History bladder cancer: on chemotherapy   5  NSTEMI: s/p cardiac cath showing multivessel disease and just getting continued medical management   6  HTN: overall BP is acceptable     Dispo: ok to d/c from a renal standpoint     SUBJECTIVE:  Feeling ok  Currently laying flat after cardiac cath with no shortness of breath  OBJECTIVE:  Current Weight: Weight - Scale: 79 8 kg (175 lb 14 8 oz)  Vitals:    01/10/18 1143   BP: 137/76   Pulse: 76   Resp: 18   Temp: 99 5 °F (37 5 °C)   SpO2: 95%       Intake/Output Summary (Last 24 hours) at 01/10/18 1159  Last data filed at 01/10/18 1001   Gross per 24 hour   Intake             2220 ml   Output             1175 ml   Net             1045 ml     General: no acute distress   Skin: no rash   Eyes: sclera anicteric   ENT: moist mucous membranes  Neck: supple, symmetric  Chest: clear to auscultation    CVS: regular rate and rhythm  Abdomen:  Soft, non-tender, non-distended   Extremities: no edema    Neuro: awake and alert   Psych: appropriate affect       Medications:  Scheduled Meds:    aspirin 81 mg Oral Daily   atorvastatin 40 mg Oral QPM   cholecalciferol 1,000 Units Oral Daily   cyanocobalamin 1,000 mcg Oral Daily   isosorbide mononitrate 60 mg Oral BID   metoprolol tartrate 50 mg Oral Q12H [facility]   multivitamin-minerals 1 tablet Oral Daily   ranolazine 1,000 mg Oral BID       PRN Meds:  LORazepam    morphine injection    nitroglycerin    ondansetron    senna    sodium chloride       Laboratory Results:  Lab Results   Component Value Date    WBC 25 40 (H) 01/10/2018    HGB 8 2 (L) 01/10/2018 HCT 25 1 (L) 01/10/2018     (H) 01/10/2018    PLT 24 (LL) 01/10/2018     Lab Results   Component Value Date    GLUCOSE 108 01/10/2018    CALCIUM 8 9 01/10/2018     01/10/2018    K 4 6 01/10/2018    CO2 25 01/10/2018     01/10/2018    BUN 19 01/10/2018    CREATININE 1 22 01/10/2018     Lab Results   Component Value Date    CALCIUM 8 9 01/10/2018    PHOS 2 7 01/06/2018

## 2018-01-10 NOTE — PROGRESS NOTES
[facility] Internal Medicine Progress Note  Patient: Lisy Leggett 74 y o  male   MRN: [id number]  PCP: Murray Pham MD  Unit/Bed#: [facility] 426-01 Encounter: [id number]  Date Of Visit: 01/10/18    Assessment:    Principal Problem:    Non-ST elevation myocardial infarction (NSTEMI), type 2 ([email or web address] )  Active Problems:    HARRIET (acute kidney injury) ([email or web address] 75 )    Chronic stable angina ([email or web address] 75 )    Hyperlipidemia    Transitional cell bladder cancer ([email or web address] 75 )      Plan:    · NSTEMI, Type 1 with Recurrent Angina -   · Antiplatelet - Aspirin / Plavix, but plavix held pre-cath  · Beta Blocker - Lopressor  · Statin - atorvastatin  · Nitrate - Imdur  · Other - Ranexa  · Procedure - cardiac catheterization 1/9/2018 by Dr Rissa Morales -  multi vessel CAD was noted with graft patent to the LAD and distal RCA  Symptoms and demand ischemia are most likely due to occluded distal OM and occluded D 1, but these are not suitable for percutaneous revascularization and medical therapy is indicated  Cardiology suggested that additional episodes of ischemia with moderate troponin release are likely in the future but invasive study should be probably best reserved for instances of objective evidence of anterior or inferior ischemia  · Monitor cath site for any bleeding  · Follows with Dr All Tejeda as outpatient  · CKD III (baseline Cr 1 3 - 1 4) with HARRIET with Solitary Kidney -   · Mucomyst was given for cath prep  · BMP in am   · Thrombocytopenia - Platelet count is 24 k  Discussed with Hematology who recommends transfusion of platelets  Will give 2 units leuko-reduced irradiated platelets  CBC in AM   · Anemia - transfuse when hemoglobin < 8 given cardiac disease  · Bladder Cancer -   · History - diagnosed with prostate cancer > 10 years ago  Had surgery and radiation  Then left nephrectomy in 2/2017 for cancer    Diagnosed with Cancer of Urinary Bladder and  Now on Gemzar Chemotherapy which is the likely cause of the thrombocytopenia and anemia  · As outpatient, his oncologist is Dr Yoon [facility] at [facility]   · Essential Hypertension - blood pressures overall controlled  maintain current regimen  · Hyperlipidemia - maintain atorvastatin  LDL at goal        VTE Pharmacologic Prophylaxis:   Pharmacologic: Pharmacologic VTE Prophylaxis contraindicated due to thrombocytopenia  Mechanical VTE Prophylaxis in Place: Yes    Patient Centered Rounds: I have performed bedside rounds with nursing staff today  Discussions with Specialists or Other Care Team Provider: None    Education and Discussions with Family / Patient: wife updated today at bedside  Time Spent for Care: 30 minutes  More than 50% of total time spent on counseling and coordination of care as described above  Current Length of Stay: 4 day(s)    Current Patient Status: Inpatient   Certification Statement: The patient will continue to require additional inpatient hospital stay due to evaluations and treatments for heart failure as above  Discharge Plan / Estimated Discharge Date: Likely discharge by tomorrow  Code Status: Level 2 - DNAR: but accepts endotracheal intubation      Subjective: Th patient states that he had some blood on tissues when he would blow his nose  He also had bleeding yesterday from cath site, but this bleeding is now resolved  The patient denies any dyspnea or chest pain  No palpitations or dizziness  Objective:     Vitals:   Temp (24hrs), Av 3 °F (36 8 °C), Min:97 6 °F (36 4 °C), Max:99 5 °F (37 5 °C)    HR:  [65-82] 76  Resp:  [18] 18  BP: (126-150)/(62-76) 137/76  SpO2:  [91 %-95 %] 95 %  Body mass index is 25 24 kg/m²  Input and Output Summary (last 24 hours): Intake/Output Summary (Last 24 hours) at 01/10/18 1256  Last data filed at 01/10/18 1001   Gross per 24 hour   Intake             2220 ml   Output             1175 ml   Net             1045 ml       Physical Exam:     Physical Exam   Constitutional: No distress  HENT:   Mouth/Throat: Oropharynx is clear and moist  No oropharyngeal exudate  Eyes: Conjunctivae are normal  Pupils are equal, round, and reactive to light  Neck: No JVD present  Cardiovascular: Normal rate and regular rhythm  No murmur heard  Pulmonary/Chest: Effort normal  He has no wheezes  He has rales (mild at bases only  )  Abdominal: Soft  Bowel sounds are normal  He exhibits no distension  There is no tenderness  Musculoskeletal:   No longer has pressure dressing over right groin cath site  No bleeding currently observed  Warm perfused lower extremities bilaterally with intact pulses  No calf tenderness or edema  Neurological: He is alert  No cranial nerve deficit  Generally oriented  Skin: No pallor  Vitals reviewed  Additional Data:     Labs:      Results from last 7 days  Lab Units 01/10/18  0632   WBC Thousand/uL 25 40*   HEMOGLOBIN g/dL 8 2*   HEMATOCRIT % 25 1*   PLATELETS Thousands/uL 24*   LYMPHO PCT % 3*   MONO PCT MAN % 3*   EOSINO PCT MANUAL % 0       Results from last 7 days  Lab Units 01/10/18  0632 01/09/18  0502   SODIUM mmol/L 137 136   POTASSIUM mmol/L 4 6 4 2   CHLORIDE mmol/L 103 102   CO2 mmol/L 25 24   BUN mg/dL 19 19   CREATININE mg/dL 1 22 1 31*   CALCIUM mg/dL 8 9 8 7   TOTAL PROTEIN g/dL  --  7 0   BILIRUBIN TOTAL mg/dL  --  0 51   ALK PHOS U/L  --  134*   ALT U/L  --  74   AST U/L  --  47*   GLUCOSE RANDOM mg/dL 108 110       Results from last 7 days  Lab Units 01/09/18  0502   INR  1 09       * I Have Reviewed All Lab Data Listed Above  * Additional Pertinent Lab Tests Reviewed: All Labs Within Last 24 Hours Reviewed    Imaging:    Imaging Reports Reviewed Today Include: No new imaging    Imaging Personally Reviewed by Myself Includes:  None    Recent Cultures (last 7 days):       Results from last 7 days  Lab Units 01/06/18  0117   URINE CULTURE  >100,000 cfu/ml Klebsiella pneumoniae*       Last 24 Hours Medication List:     aspirin 81 mg Oral Daily atorvastatin 40 mg Oral QPM   cholecalciferol 1,000 Units Oral Daily   cyanocobalamin 1,000 mcg Oral Daily   isosorbide mononitrate 60 mg Oral BID   metoprolol tartrate 50 mg Oral Q12H [address]   multivitamin-minerals 1 tablet Oral Daily   ranolazine 1,000 mg Oral BID        Today, Patient Was Seen By: Dayan Rowland DO    ** Please Note: This note has been constructed using a voice recognition system   **

## 2018-01-11 VITALS
SYSTOLIC BLOOD PRESSURE: 129 MMHG | HEART RATE: 71 BPM | RESPIRATION RATE: 18 BRPM | DIASTOLIC BLOOD PRESSURE: 61 MMHG | BODY MASS INDEX: 25.19 KG/M2 | TEMPERATURE: 98.3 F | OXYGEN SATURATION: 97 % | HEIGHT: 70 IN | WEIGHT: 175.93 LBS

## 2018-01-11 PROBLEM — I10 ESSENTIAL HYPERTENSION: Status: ACTIVE | Noted: 2018-01-11

## 2018-01-11 PROBLEM — D69.59 THROMBOCYTOPENIA DUE TO DRUGS: Status: ACTIVE | Noted: 2018-01-11

## 2018-01-11 PROBLEM — I21.4 NSTEMI (NON-ST ELEVATED MYOCARDIAL INFARCTION) (HCC): Status: ACTIVE | Noted: 2018-01-06

## 2018-01-11 PROBLEM — T50.905A THROMBOCYTOPENIA DUE TO DRUGS: Status: ACTIVE | Noted: 2018-01-11

## 2018-01-11 PROBLEM — R06.00 DYSPNEA: Status: RESOLVED | Noted: 2018-01-05 | Resolved: 2018-01-11

## 2018-01-11 PROBLEM — R07.9 CHEST PAIN: Status: RESOLVED | Noted: 2018-01-05 | Resolved: 2018-01-11

## 2018-01-11 PROBLEM — N18.30 CKD (CHRONIC KIDNEY DISEASE), STAGE III (HCC): Status: ACTIVE | Noted: 2018-01-11

## 2018-01-11 PROBLEM — N17.9 AKI (ACUTE KIDNEY INJURY) (HCC): Status: RESOLVED | Noted: 2018-01-05 | Resolved: 2018-01-11

## 2018-01-11 LAB
ABO GROUP BLD BPU: NORMAL
ABO GROUP BLD BPU: NORMAL
ANION GAP SERPL CALCULATED.3IONS-SCNC: 9 MMOL/L (ref 4–13)
BPU ID: NORMAL
BPU ID: NORMAL
BUN SERPL-MCNC: 18 MG/DL (ref 5–25)
CALCIUM SERPL-MCNC: 9.3 MG/DL (ref 8.3–10.1)
CHLORIDE SERPL-SCNC: 102 MMOL/L (ref 100–108)
CO2 SERPL-SCNC: 26 MMOL/L (ref 21–32)
CREAT SERPL-MCNC: 1.32 MG/DL (ref 0.6–1.3)
ERYTHROCYTE [DISTWIDTH] IN BLOOD BY AUTOMATED COUNT: 20.5 % (ref 11.6–15.1)
GFR SERPL CREATININE-BSD FRML MDRD: 53 ML/MIN/1.73SQ M
GLUCOSE SERPL-MCNC: 113 MG/DL (ref 65–140)
HCT VFR BLD AUTO: 24.1 % (ref 36.5–49.3)
HGB BLD-MCNC: 8 G/DL (ref 12–17)
MCH RBC QN AUTO: 33.6 PG (ref 26.8–34.3)
MCHC RBC AUTO-ENTMCNC: 33.2 G/DL (ref 31.4–37.4)
MCV RBC AUTO: 101 FL (ref 82–98)
PLATELET # BLD AUTO: 84 THOUSANDS/UL (ref 149–390)
PMV BLD AUTO: 10.4 FL (ref 8.9–12.7)
POTASSIUM SERPL-SCNC: 4.3 MMOL/L (ref 3.5–5.3)
RBC # BLD AUTO: 2.38 MILLION/UL (ref 3.88–5.62)
SODIUM SERPL-SCNC: 137 MMOL/L (ref 136–145)
UNIT DISPENSE STATUS: NORMAL
UNIT DISPENSE STATUS: NORMAL
UNIT PRODUCT CODE: NORMAL
UNIT PRODUCT CODE: NORMAL
UNIT RH: NORMAL
UNIT RH: NORMAL
WBC # BLD AUTO: 20.77 THOUSAND/UL (ref 4.31–10.16)

## 2018-01-11 PROCEDURE — 85027 COMPLETE CBC AUTOMATED: CPT | Performed by: INTERNAL MEDICINE

## 2018-01-11 PROCEDURE — 80048 BASIC METABOLIC PNL TOTAL CA: CPT | Performed by: INTERNAL MEDICINE

## 2018-01-11 RX ORDER — METOPROLOL TARTRATE 50 MG/1
50 TABLET, FILM COATED ORAL EVERY 12 HOURS SCHEDULED
Qty: 60 TABLET | Refills: 0 | Status: SHIPPED | OUTPATIENT
Start: 2018-01-11 | End: 2018-01-31 | Stop reason: SDUPTHER

## 2018-01-11 RX ORDER — ATORVASTATIN CALCIUM 40 MG/1
40 TABLET, FILM COATED ORAL EVERY EVENING
Qty: 30 TABLET | Refills: 0 | Status: SHIPPED | OUTPATIENT
Start: 2018-01-11 | End: 2018-02-20 | Stop reason: SDUPTHER

## 2018-01-11 RX ORDER — FUROSEMIDE 20 MG/1
20 TABLET ORAL DAILY
Qty: 30 TABLET | Refills: 0 | Status: SHIPPED | OUTPATIENT
Start: 2018-01-11 | End: 2018-01-31 | Stop reason: SDUPTHER

## 2018-01-11 RX ORDER — RANOLAZINE 1000 MG/1
1000 TABLET, EXTENDED RELEASE ORAL 2 TIMES DAILY
Qty: 60 TABLET | Refills: 0 | Status: SHIPPED | OUTPATIENT
Start: 2018-01-11 | End: 2018-02-20

## 2018-01-11 RX ORDER — ISOSORBIDE MONONITRATE 60 MG/1
60 TABLET, EXTENDED RELEASE ORAL 2 TIMES DAILY
Qty: 60 TABLET | Refills: 0 | Status: SHIPPED | OUTPATIENT
Start: 2018-01-11 | End: 2018-01-31 | Stop reason: SDUPTHER

## 2018-01-11 RX ADMIN — ISOSORBIDE MONONITRATE 60 MG: 60 TABLET, EXTENDED RELEASE ORAL at 09:55

## 2018-01-11 RX ADMIN — Medication 1 TABLET: at 09:49

## 2018-01-11 RX ADMIN — ASPIRIN 81 MG 81 MG: 81 TABLET ORAL at 09:49

## 2018-01-11 RX ADMIN — METOPROLOL TARTRATE 50 MG: 50 TABLET ORAL at 09:49

## 2018-01-11 RX ADMIN — CYANOCOBALAMIN TAB 500 MCG 1000 MCG: 500 TAB at 09:49

## 2018-01-11 RX ADMIN — VITAMIN D, TAB 1000IU (100/BT) 1000 UNITS: 25 TAB at 09:55

## 2018-01-11 RX ADMIN — RANOLAZINE 1000 MG: 500 TABLET, FILM COATED, EXTENDED RELEASE ORAL at 09:49

## 2018-01-11 RX ADMIN — FUROSEMIDE 20 MG: 20 TABLET ORAL at 09:49

## 2018-01-11 NOTE — PROGRESS NOTES
Active Problems    1  Adenocarcinoma of kidney (189 0) (C64 9)   2  Benign essential HTN (401 1) (I10)   3  Benign essential tremor (333 1) (G25 0)   4  CAD (coronary artery disease) (414 00) (I25 10)   5  Chronic stable angina (413 9) (I20 8)   6  Gross hematuria (599 71) (R31 0)   7  Hyperlipidemia (272 4) (E78 5)   8  Incontinence (788 30) (R32)   9  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 (188 2) (C67 2)   10  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)   11  RBBB (right bundle branch block) (426 4) (I45 10)   12  Sleep disturbance (780 50) (G47 9)    Current Meds   1  AmLODIPine Besylate 2 5 MG Oral Tablet; TAKE 1 TABLET DAILY; Therapy: (Recorded:[id number]) to Recorded   2  Aspirin 81 MG TABS; Take 1 tablet daily; Therapy: (Recorded:[id number]) to Recorded   3  Atorvastatin Calcium 20 MG Oral Tablet; TAKE 1 TABLET DAILY; Therapy: [id number] to Recorded   4  CARBOplatin 150 MG/15ML Intravenous Solution; AS DIRECTED   LISTED AS 8   /CARBOPLATOIN AUC5 D1 EVERY 21;   Therapy: [id number] to Recorded   5  Gemzar 1 GM Intravenous Solution Reconstituted; USE AS DIRECTED; Therapy: [id number] to Recorded   6  Metoprolol Tartrate 25 MG Oral Tablet; TAKE 1 TABLET TWICE DAILY; Therapy: (Recorded:[id number]) to Recorded   7  Multi-Vitamins TABS; TAKE 1 TABLET DAILY; Therapy: (Recorded:[id number]) to Recorded   8  Ranexa 500 MG Oral Tablet Extended Release 12 Hour; Take 1 tablet twice daily; Therapy: (Recorded:[id number]) to Recorded   9  Vitamin B-12 TABS; TAKE 1 TABLET DAILY; Therapy: (Recorded:[id number]) to Recorded   10  Vitamin D3 2000 UNIT Oral Capsule; take 1 capsule daily; Therapy: (Recorded:[id number]) to Recorded    Allergies    1  Levaquin TABS    Procedure    Procedure: Self Catheter: Written and verbal instructions given  Demonstration complete  PT  PRESENTS FOR A CIC TEACHING, WHICH WENT WELL  THE DIRECTIONS WERE CLEAR   PT  DID STATE URINE DID FLOW THROUGH CATHETER, ANS HE FELT COMFORTABLE ENOUGH TO DO IT, IF HE RUNS INTO TROUBLE  PT  AND WIFE HAD SOME QUESTIONS FOR DR BLANCHARD, SO HE STEPPED IN BRIEFLY  WILL KEEP UPCOMING APPT'S  Procedure: Bladder Catheterization        Future Appointments    Date/Time Provider Specialty Site   09/14/2017 09:00 AM María Graves, [facility]rology [facility] [address] [facility]   10/05/2017 09:15 AM Kentrell Luevano MD Urology [address]     Signatures   Electronically signed by : Prabhakar Cash, ; Sep  7 2017 10:29AM EST                       (Author)    Electronically signed by : Nithin Puentes MD; Sep  7 2017 10:29AM EST                       (Author)

## 2018-01-11 NOTE — MISCELLANEOUS
Message  DAUGHTER SAID, DR DUARTE DOES NOT WANT CYSTO NOW DUE TO LOW PLATELETS  HE REC  WAIT TIL EVAL AFTER NEXT PET SCAN LATE OCTOBER   PT NOT BLEEDING RECENTLY; HE HAS STRAIGHT CATH IN CASE GET OBSTRUCTED      Signatures   Electronically signed by : Deidra Fernandez MD; Sep 14 2017 10:02AM EST                       (Author)

## 2018-01-11 NOTE — PROGRESS NOTES
Progress Note - Cardiology   Nikita Cardozo 74 y o  male MRN: [id number]  Encounter: [id number]  01/11/18  10:41 AM        Assessment/Plan:    1  Recurrent angina now with NSTEMI type II  On aspirin, Plavix, statin, beta blocker, Imdur 60 and Ranexa 1000 bid  Had cath 1/9/18, no new disease, unable to intervene on occluded distal OM and occluded D1 which are likely cause of his angina  Recommended continued medical management  Changed Imdur 60 to bid, and switched Metoprolol to 50 bid  Given his thrombocytopenia, stopped Plavix  Continued aspirin given his CAD and angina  2  HTN  BP controlled with Metoprolol, Imdur, Lasix 20  Amlodipine stopped in favor of titrating up Imdur  3  HARRIET on CKD III  Creatinine stable, improved from peak of 1 6, restarted Lasix 20 PO daily 1/10  Baseline 1 3-1 4  4  HL  On Atorvastatin 40, LDL at goal      Follows with Dr Eric Mera as outpt  Stable for discharge from cardiac standpoint  Will sign off, call with questions  Subjective/Objective   Chief Complaint: No chief complaint on file  Subjective: 74year old man with a history of CAD s/p CABG, HTN, HL, bladder cancer undergoing chemotherapy, CKD III s/p nephrectomy for RCC, thrombocytopenia, who was admitted with recurrent exertional angina at [facility], troponin elevation concerning for NSTEMI, who was transferred to [facility] AND [facility] for possible high risk cardiac catheterization/PCI of known LCX disease which is likely causing his symptoms, despite maximal medical management  Cardiac catheterization 1/9/18 showed LIMA and SVG to distal RCA patent, has chronically occluded OM and D1  No PCI interventions available, recommended continued medical therapy  He did get platelet transfusion yesterday, but still has occasional nose bleeding, but nothing continuous  Platelets improved today  Did go for walks yesterday, no exertional CP or SOB  No LE edema  No palpitations, dizziness, or lightheadedness  No fevers      Patient Active Problem List   Diagnosis    Leukocytosis    Chronic anemia    Chronic stable angina (HCC)    CAD (coronary artery disease)    Hyperlipidemia    RBBB    Trans[facility] bladder [facility] )    NSTEMI (non-ST elevated myocardial infarction) ([facility] )    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    Essential hypertension    CKD (chronic kidney disease), stage III     Past Medical History:   Diagnosis Date    Anesthesia     "can't urinate after surgery"    Basal cell carcinoma     Coronary artery disease     Essential tremor     of head and hands bilat    Hearing aid worn     bilat    History of kidney stones     History of pneumonia     childhood    History of prostate cancer     Hx of radiation therapy     2007 for after prostate surgery    Hyperlipidemia     Hypertension     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     and" left kidney and left ureter removed"    Urinary incontinence     wears Depends    Wears glasses     Wears partial dentures     lower       Allergies   Allergen Reactions    Levaquin [Levofloxacin] Rash    Percocet [Oxycodone-Acetaminophen] GI Intolerance     Constipation,,,happens with narcotics like percocet       Current Facility-Administered Medications   Medication Dose Route Frequency Provider Last Rate Last Dose    aspirin chewable tablet 81 mg  81 mg Oral Daily Hetul Soliz, DO   81 mg at 01/11/18 0949    atorvastatin (LIPITOR) tablet 40 mg  40 mg Oral QPM Hetul Soliz, DO   40 mg at 01/10/18 1834    cholecalciferol (VITAMIN D3) tablet 1,000 Units  1,000 Units Oral Daily Hetul Soliz, DO   1,000 Units at 01/11/18 0955    cyanocobalamin (VITAMIN B-12) tablet 1,000 mcg  1,000 mcg Oral Daily Hetul Soliz, DO   1,000 mcg at 01/11/18 0949    furosemide (LASIX) tablet 20 mg  20 mg Oral Daily Erin Sifuentes MD   20 mg at 01/11/18 0949    isosorbide mononitrate (IMDUR) 24 hr tablet 60 mg  60 mg Oral BID Erin Sifuentes MD   60 mg at 01/11/18 0955    LORazepam (ATIVAN) tablet 0 5 mg  0 5 mg Oral Q8H PRN Hetul Soliz, DO        metoprolol tartrate (LOPRESSOR) tablet 50 mg  50 mg Oral Q12H [address] Niesha Rivera MD   50 mg at 01/11/18 0949    morphine injection 1 mg  1 mg Intravenous Q4H PRN Hetul Soliz, DO        multivitamin-minerals (CENTRUM) tablet 1 tablet  1 tablet Oral Daily Hetul Soliz, DO   1 tablet at 01/11/18 0949    nitroglycerin (NITROSTAT) SL tablet 0 4 mg  0 4 mg Sublingual Q5 Min PRN Hetul Soliz, DO        ondansetron (ZOFRAN) injection 4 mg  4 mg Intravenous Q6H PRN Hetul Soliz, DO        ranolazine (RANEXA) 12 hr tablet 1,000 mg  1,000 mg Oral BID Isabella Yun MD   1,000 mg at 01/11/18 0949    senna (SENOKOT) tablet 8 6 mg  1 tablet Oral HS PRN Hetul Soliz, DO        sodium chloride (OCEAN) 0 65 % nasal spray 1 spray  1 spray Each Nare Q1H PRN Shashi Griffith MD   1 spray at 01/09/18 2103       Vitals: /61 Comment: Map 88  Pulse 71   Temp 98 3 °F (36 8 °C) (Oral)   Resp 18   Ht 5' 10" (1 778 m)   Wt 79 8 kg (175 lb 14 8 oz)   SpO2 97%   BMI 25 24 kg/m²     Intake/Output Summary (Last 24 hours) at 01/11/18 1041  Last data filed at 01/11/18 0700   Gross per 24 hour   Intake             1180 ml   Output              250 ml   Net              930 ml     Wt Readings from Last 3 Encounters:   01/06/18 79 8 kg (175 lb 14 8 oz)   01/05/18 77 1 kg (170 lb)   07/03/17 77 6 kg (171 lb)       Body mass index is 25 24 kg/m²  ,     Vitals:    01/10/18 2050 01/11/18 0002 01/11/18 0334 01/11/18 0817   BP: 151/75 127/61 131/68 129/61   Pulse: 78 67 71 71   Patient Position - Orthostatic VS:  Lying Lying Lying       Physical Exam:     GEN: Awake and alert, in no acute distress, lying in bed  HEENT: Sclera anicteric, conjunctivae pink, mucous membranes moist   NECK: Supple, no carotid bruits, no significant JVD  HEART: Regular rhythm, normal S1 and S2, no murmurs, clicks, gallops or rubs     LUNGS: Clear to auscultation bilaterally; no wheezes, rales, or rhonchi   ABDOMEN: Soft, nontender, nondistended, normoactive bowel sounds  EXTREMITIES: Skin warm and well perfused, no clubbing, cyanosis, or edema  NEURO: No focal findings  SKIN: Normal without suspicious lesions on exposed skin        Lab Results:     BMP:    Results from last 7 days  Lab Units 01/11/18  0503 01/10/18  1400 01/09/18  0502 01/08/18  0450 01/07/18  0504 01/06/18  0417 01/05/18  2042   SODIUM mmol/L 137 137 136 136 138 139 139   POTASSIUM mmol/L 4 3 4 6 4 2 4 3 4 7 4 7 4 8   CHLORIDE mmol/L 102 103 102 102 105 108 104   CO2 mmol/L 26 25 24 27 26 22 27   BUN mg/dL 18 19 19 21 18 22 26*   CREATININE mg/dL 1 32* 1 22 1 31* 1 25 1 24 1 37* 1 68*   GLUCOSE RANDOM mg/dL 113 108 110 110 101 114 213*   CALCIUM mg/dL 9 3 8 9 8 7 8 6 8 5 8 2* 8 9       CBC:     Results from last 7 days  Lab Units 01/11/18  0503 01/10/18  3988 01/09/18  0502 01/08/18  0450 01/07/18  0504 01/06/18  0417 01/06/18  0042 01/05/18  2042   WBC Thousand/uL 20 77* 25 40* 18 42* 22 29* 23 00* 21 56*  --  24 25*   HEMOGLOBIN g/dL 8 0* 8 2* 8 6* 8 6* 8 6* 8 9*  --  9 7*   HEMATOCRIT % 24 1* 25 1* 26 0* 26 7* 26 2* 28 0*  --  30 8*   MCV fL 101* 101* 102* 103* 104* 105*  --  106*   PLATELETS Thousands/uL 84* 24* 25* 36* 41* 38* 42* 58*   MCH pg 33 6 32 9 33 7 33 1 34 0 33 5  --  33 4   MCHC g/dL 33 2 32 7 33 1 32 2 32 8 31 8  --  31 5   RDW % 20 5* 20 3* 20 6* 20 8* 20 8* 20 7*  --  20 5*   MPV fL 10 4 11 5 10 8 10 8 11 0 10 8 11 2 9 7   NRBC AUTO /100 WBCs  --  0 0 0 0  --   --   --          Results from last 7 days  Lab Units 01/06/18  0700 01/06/18  0405 01/06/18  0042   TROPONIN I ng/mL 5 20* 5 81* 3 24*          Results from last 7 days  Lab Units 01/06/18  0417   MAGNESIUM mg/dL 1 7       INR:     Results from last 7 days  Lab Units 01/09/18  0502 01/06/18  0042   INR  1 09 1 12       Lipid Profile:   Lab Results   Component Value Date    CHOL 103 01/06/2018     Lab Results   Component Value Date    HDL 34 (L) 01/06/2018 Lab Results   Component Value Date    LDLCALC 44 01/06/2018     Lab Results   Component Value Date    TRIG 124 01/06/2018       Telemetry: off tele

## 2018-01-11 NOTE — DISCHARGE INSTRUCTIONS
Dear Eddi Chase,     It was our pleasure to care for you here at [facility]   It is our hope that we were always able to meet and exceed the expected standards for your care during your stay  You were hospitalized due to chest pain with multivessel coronary disease and thrombocytopenia (low platelet counts)  You were cared for on the 4th floor under the service of Karyn Bah, DO with the [facility] who covers for your primary care physician (PCP), Yenni Jamison MD, while you were hospitalized  If you have any questions or concerns related to this hospitalization, you may contact us at [phone]  For follow up, we recommend that you follow up with your primary care physician  Please review this entire discharge summary as additional general instructions may be provided later as well  However, at this time we provide for you here, the most important instructions / recommendations at discharge:     · See Post Catheterization Instructions by cardiology below this letter  · Limit your total sodium intake each day to less than 2000 mg (2 grams) per day maximum  It is very important to read food labels as there is hidden sodium in various types of foods  Also, do not add salt to your food  Sodium / salt causes fluid retention and can cause your heart failure to act up again  · Limit total fluid intake to one and a half liters per day maximum (or one and a half quarts if that is easier to remember)  This includes all fluids consumed  · Take your diuretic (water pill), furosemide, and other prescribed heart medications as recommended  Due to its tendency to cause swelling in the legs, we stopped amlodipine (norvasc) which you were taking for blood pressure, especially as we have adjusted various other medications for the heart to higher doses    · Continue on Ranexa, isosorbide, metoprolol, aspirin, and atorvastatin at the doses specified on discharge medication list   Please note that some of the doses have been adjusted from your prior home doses  · Follow up with your heart doctor within 1 week  · Weigh yourself daily on the same scale with the same amount of clothing each day  If you have a weight gain > 3 lbs in a day or 5 lbs in a week, please contact your cardiologist for further instructions especially if weight gain is associated with increased shortness of breath or leg swelling  For [facility] Cardiology Patients ONLY, we suggest calling the [facility] at  instead of directly calling your cardiologist   · We recommend that you have bloodwork repeated on Tuesday 1/16/2018  Sincerely,     Josey Section, DO    Post Cardiac Catheterization Instructions:     1  Please see the post cardiac catheterization dishcarge instructions  No heavy lifting, greater than 10 lbs  or strenuous  activity for 48 hrs  2 Remove band aid tomorrow  Shower and wash area- wrist gently with soap and water- beginning tomorrow  Rinse and pat dry  Apply new water seal band aid  Repeat this process for 5 days  No powders, creams lotions or antibiotic ointments  for 5 days  No tub baths, hot tubs or swimming for 5 days  3  Please call our office ([phone]) if you have any fever, redness, swelling, discharge from your wrist access site  4 No driving for 2 days          Left Heart Catheterization   WHAT YOU NEED TO KNOW:   A left heart catheterization is a procedure to look at your heart and its arteries  You may need this procedure if you have chest pain, heart disease, or your heart is not working as it should  DISCHARGE INSTRUCTIONS:   Follow up with your healthcare provider as directed:  Write down your questions so you remember to ask them during your visits  Limit activity as directed:   · Avoid unnecessary stair climbing for 48 hours, if a catheter was put in your groin      · Do not place pressure on your arm, hand, or wrist, if the catheter was placed in your wrist  Avoid pushing, pulling, or heavy lifting with that arm  · If you need to cough, support the area where the catheter was inserted with your hand  · Ask your healthcare provider how long you need to limit movement and avoid certain activities  · You may feel like resting more after your procedure  Slowly start to do more each day  Rest when you feel it is needed  Drink liquids as directed:  Liquids help flush the dye used for your procedure out of your body  Ask your healthcare provider how much liquid to drink each day, and which liquids to drink  Some foods, such as soup and fruit, also provide liquid  Wound care:  Ask your healthcare provider about how to care for your incision wound  Ask when you can get into a tub, shower, or pool  Contact your healthcare provider if:   · You have a fever  · The skin around your wound is red, swollen, or has pus coming from it  · You have trouble breathing, or your skin is itchy, swollen, or has a rash  · You have questions or concerns about your condition or care  Seek care immediately or call 911 if:   · The area where the catheter was placed is swollen and filled with blood or is bleeding  · The leg or arm used for the procedure becomes numb or turns white or blue  · You feel lightheaded, short of breath, and have chest pain  · You cough up blood  · You have any of the following signs of a heart attack:      ¨ Squeezing, pressure, or pain in your chest that lasts longer than 5 minutes or returns    ¨ Discomfort or pain in your back, neck, jaw, stomach, or arm     ¨ Trouble breathing    ¨ Nausea or vomiting    ¨ Lightheadedness or a sudden cold sweat, especially with chest pain or trouble breathing    · Your arm or leg feels warm, tender, and painful  It may look swollen and red      · You have any of the following signs of a stroke:     ¨ Part of your face droops or is numb    ¨ Weakness in an arm or leg    ¨ Confusion or difficulty speaking    ¨ Dizziness, a severe headache, or vision loss  © 2017 [employer] Information is for End User's use only and may not be sold, redistributed or otherwise used for commercial purposes  All illustrations and images included in CareNotes® are the copyrighted property of A D [employer] M [employer]  or [employer] Ocampo  The above information is an  only  It is not intended as medical advice for individual conditions or treatments  Talk to your doctor, nurse or pharmacist before following any medical regimen to see if it is safe and effective for you

## 2018-01-11 NOTE — SOCIAL WORK
Pt is cleared for d/c this day to return home w/ no needs  Pt and his wife Victoria Felix were both informed of d/c  Wife will transport pt home later this day, pickup time TBD  IMM signed by pt's wife on 1/9/18  No chart copy required  CM to follow

## 2018-01-11 NOTE — PLAN OF CARE
DISCHARGE PLANNING - CARE MANAGEMENT     Discharge to post-acute care or home with appropriate resources Completed        Prexisting or High Potential for Compromised Skin Integrity     Skin integrity is maintained or improved Completed

## 2018-01-11 NOTE — DISCHARGE SUMMARY
Discharge Summary - [facility]    Patient Information: Nikita Cardozo 74 y o  male MRN: [id number]  Unit/Bed#: [facility] 426-01 Encounter: [id number]    Discharging Physician / Practitioner: Rigoberto Espinoza DO  PCP: Lebron Pantoja MD  Admission Date: 1/6/2018  Discharge Date: 01/11/18    Reason for Admission: Chest Pain, thrombocytopenia, anemia    Discharge Diagnoses:     Principal Problem:    NSTEMI (non-ST elevated myocardial infarction) [facility])  Active Problems:    Leukocytosis    Chronic anemia    Chronic stable angina (HCC)    CAD (coronary artery disease)    Hyperlipidemia    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    Essential hypertension    CKD (chronic kidney disease), stage III  Resolved Problems:    Chest pain    HARRIET (acute kidney injury) ([facility] )    Dyspnea      Consultations During Hospital Stay:  · Cardiology - Dr Daysi Cheek  · Nephrology - Dr Jaspreet Walsh  · Hematology / [facility] - Dr Celeste Casas    Procedures Performed:     · Cardiac Catheterization on 1/9/2018 - multi vessel CAD with grafts patent to the LAD and distal RCA  Symptoms/demand ischemia most likely due to occluded distal OM and occluded D1, which are not felt to be suitable to percutaneous revascularization  Continued medical therapy was indicated  There is anticipation of additional episodes of ischemia with moderate troponin release in the future  However invasive studies are probably best reserved for instances of objective evidence of anterior or inferior ischemia  In terms of details, Left main: Normal   LAD: There was a 100 % stenosis in mid vessel  The mid and distal LAD fill via a LIMA conduit  Circumflex: The vessel was normal sized and gives rise to a major OM branch  The OM is completely occluded distally, and fills by collateral flow from the grafted RCA  RCA: The vessel was normal sized and dominant  There was a total occlusion in mid vessel  The distal RCA and PDA fill via an SVG  Graft to the LAD: The graft was a LIMA  The body of the graft and the anastomosis were normal  The grafted mid and distal LAD filled well and were free of critical disease  The LAD sent collateral flow to D1  Graft to the distal RCA: The graft was a saphenous vein graft from the ascending aorta  The body of the graft and the distal anastomosis were normal  The grafted distal RCA/PDA filled well and were free of critical disease  The SVG/RCA sent collateral flow to the occluded OM  · Transfusion 2 units leukoreduced irradiated Platelets on 1/16/0740  Significant Findings / Test Results:     · Chest x-ray 01/06/2018 - interval development of mild vascular congestion  Persistent cardiomegaly status post CABG  · Urine culture showed greater than 100,000 CFU/mL Klebsiella pneumoniae with resistance to ampicillin and intermediate susceptibility to nitrofurantoin  Urinalysis itself showed large occult blood, negative nitrites, and small leukocytes with occasional bacteria  Despite all this, patient lacked any true symptoms of a urinary tract infection  · Creatinine on admission was 1 68  This trended down to 1 24 with a BUN of 18 by 01/07/2018  It would then fluctuate in the 1 2-1 3 range for the rest of the hospitalization  Electrolytes were all stable  · Troponins began at 3 24, peaked at 5 81, and improved to 5 20 when last checked on 01/06/2018  · Lipid profile showed cholesterol 103, triglyceride 124, HDL 34, and LDL 44  · White blood cell count elevated throughout the entire admission  Initial white blood cell count 24 25 and final white blood cell count 20 77  The range during this admission was 18 42 to 25 40  · Hemoglobin range for this admission was 8 0-9 7  · Platelet count range during this admission was from 24 - 84  On admission platelet count was 58  On discharge it is 80 following transfusion 2 units platelets  · Haptoglobin 213  Fibrinogen 337      Incidental Findings: · None    Test Results Pending at Discharge (will require follow up): · None     Outpatient Tests Requested:  · CBC and BMP on 1/16/2018 - copy to PCP (Dr Luana Marks) and Dr      Complications:  Mild bleeding from groin catheter site following cardiac catheterization, which resolved on the day of the catheterization  This, however, required pressure dressing and epinephrine injection  Hospital Course:     Corrie Benites is a 74 y o  male patient who originally presented to the [address] [place] [address] on 1/6/2018 as a transfer from [place] [address]  where he had presented with chest pain and was subsequently found to have elevated troponins  When admitted the patient was also discovered to have anemia and thrombocytopenia which was felt to be most likely related to chemotherapy that the patient is receiving for transitional cell carcinoma of the bladder under the direction of his hematologist/oncologist at [facility]   Due to the thrombocytopenia, the patient was felt to be high risk for cardiac catheterization which is what prompted the transfer to the [facility]  Due to the thrombocytopenia, we did not provide heparin drip for this patient but did continue anti-platelet therapy while he was here  The patient was monitored clinically by the hospital service as well as Cardiology and his symptoms seemed to improve  The patient did go for cardiac catheterization and aside from some bleeding at the groin site following the procedure which resolve with pressure dressing and epinephrine injection, he had no other significant complications  The patient was found to have multi-vessel disease, but none of the lesions noted were able to be treated through stenting and medical management was recommended  See cardiac catheterization result above for full details    At this point, the patient is being maintained on aspirin 81 mg daily, atorvastatin 40 mg daily, furosemide 20 mg daily, isosorbide 60 mg daily, metoprolol 50 mg b i d , and Ranexa 1000 mg b i d     The patient did require transfusion for his thrombocytopenia when the counts dropped down to 24,000  It was at that time that the patient had his cardiac catheterization and did have some bleeding in the groin site as well as some a mild epistaxis  By the time of discharge all these bleeding symptoms have resolved and the patient's platelet count following transfusion of 2 units of platelets is up at 98,990  A repeat CBC as well as BMP is recommended on 01/16/2018  Condition at Discharge: stable     Discharge Day Visit / Exam:     Subjective: No acute complaints today  He feels well  No additional bleeding  Vitals: Blood Pressure: 129/61 (Map 88) (01/11/18 0817)  Pulse: 71 (01/11/18 0817)  Temperature: 98 3 °F (36 8 °C) (01/11/18 0817)  Temp Source: Oral (01/11/18 0817)  Respirations: 18 (01/11/18 0817)  Height: 5' 10" (177 8 cm) (01/06/18 1311)  Weight - Scale: 79 8 kg (175 lb 14 8 oz) (01/06/18 1311)  SpO2: 97 % (01/11/18 0817)  Exam:   Physical Exam   Constitutional: He is oriented to person, place, and time  HENT:   Mouth/Throat: Oropharynx is clear and moist  No oropharyngeal exudate  Eyes: Pupils are equal, round, and reactive to light  Cardiovascular: Normal rate and regular rhythm  No murmur heard  Pulmonary/Chest: Effort normal and breath sounds normal  No respiratory distress  He has no wheezes  He has no rales  Abdominal: Soft  Bowel sounds are normal  He exhibits no distension  There is no tenderness  Genitourinary:   Genitourinary Comments: No bleeding at groin catheter site   Musculoskeletal: He exhibits no edema or tenderness  Neurological: He is alert and oriented to person, place, and time  No cranial nerve deficit  Skin: No rash noted  Vitals reviewed  Discussion with Family: wife updated at bedside      Discharge instructions/Information to patient and family:   See after visit summary for information provided to patient and family  Provisions for Follow-Up Care:  See after visit summary for information related to follow-up care and any pertinent home health orders  Disposition:     Home    Planned Readmission: None     Discharge Statement:  I spent 40 minutes discharging the patient  This time was spent on the day of discharge  I had direct contact with the patient on the day of discharge  Greater than 50% of the total time was spent examining patient, answering all patient questions, arranging and discussing plan of care with patient as well as directly providing post-discharge instructions  Additional time then spent on discharge activities  Discharge Medications:  See after visit summary for reconciled discharge medications provided to patient and family        ** Please Note: This note has been constructed using a voice recognition system **

## 2018-01-12 VITALS
DIASTOLIC BLOOD PRESSURE: 67 MMHG | SYSTOLIC BLOOD PRESSURE: 142 MMHG | HEIGHT: 70 IN | BODY MASS INDEX: 25.2 KG/M2 | WEIGHT: 176 LBS

## 2018-01-12 NOTE — MISCELLANEOUS
Message   Recorded as Task   Date: 08/24/2017 03:39 PM, Created By: Lraa Cain   Task Name: Call Back   Assigned To: Mark GEE,TEAM   Regarding Patient: Lesvia Luu, Status: In Progress   Clyde Monreal - 24 Aug 2017 3:39 PM     TASK CREATED  Pt called and stated that the last few days he has had brown and red blood intermittent  Stated that he does workout on the bike and treadmill  Pt denies fever, chills or painful urination  Informed pt that it is most likely scabs breaking lose from TURBT in 07/17  Pt was just concerned and wanted to talk to Dr Elle Cain - 24 Aug 2017 3:40 PM     TASK EDITED  Pt's wife stated she called last week and no call was returned  Lara Cain - 24 Aug 2017 3:42 PM     TASK EDITED  Will address with Dr Elle Randle personally tomorrow  Lara Cain - 24 Aug 2017 3:42 PM     TASK IN PROGRESS   Lara Cain - 25 Aug 2017 2:18 PM     TASK EDITED  Pt's wife returned call and stated that he passed a blood clot around 12:00 and unable to void now  Pt was directed to come in  Active Problems    1  Adenocarcinoma of kidney (189 0) (C64 9)   2  Benign essential HTN (401 1) (I10)   3  Benign essential tremor (333 1) (G25 0)   4  CAD (coronary artery disease) (414 00) (I25 10)   5  Chronic stable angina (413 9) (I20 8)   6  Gross hematuria (599 71) (R31 0)   7  Hyperlipidemia (272 4) (E78 5)   8  Incontinence (788 30) (R32)   9  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 (188 2) (C67 2)   10  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)   11  RBBB (right bundle branch block) (426 4) (I45 10)   12  Sleep disturbance (780 50) (G47 9)    Current Meds   1  AmLODIPine Besylate 2 5 MG Oral Tablet; TAKE 1 TABLET DAILY; Therapy: (Recorded:90Dog9578) to Recorded   2  Aspirin 81 MG TABS; Take 1 tablet daily; Therapy: (Recorded:05Oxp9992) to Recorded   3   Atorvastatin Calcium 20 MG Oral Tablet; TAKE 1 TABLET DAILY; Therapy: 34MJZ6519 to Recorded   4  CARBOplatin 150 MG/15ML Intravenous Solution; AS DIRECTED   LISTED AS 8   /CARBOPLATOIN AUC5 D1 EVERY 21;   Therapy: 22Aug2017 to Recorded   5  Gemzar 1 GM Intravenous Solution Reconstituted (Gemcitabine HCl); USE AS   DIRECTED; Therapy: 22Aug2017 to Recorded   6  Metoprolol Tartrate 25 MG Oral Tablet; TAKE 1 TABLET TWICE DAILY; Therapy: (Recorded:85Chi5101) to Recorded   7  Multi-Vitamins TABS; TAKE 1 TABLET DAILY; Therapy: (Recorded:13Sep2016) to Recorded   8  Ranexa 500 MG Oral Tablet Extended Release 12 Hour; Take 1 tablet twice daily; Therapy: (Recorded:14Apr2016) to Recorded   9  Vitamin B-12 TABS; TAKE 1 TABLET DAILY; Therapy: (Recorded:13Sep2016) to Recorded   10  Vitamin D3 2000 UNIT Oral Capsule; take 1 capsule daily; Therapy: (Recorded:12Nov2015) to Recorded    Allergies    1   Levaquin TABS    Signatures   Electronically signed by : Ryne Tirado, ; Aug 25 2017  2:18PM EST                       (Author)

## 2018-01-13 VITALS
BODY MASS INDEX: 23.67 KG/M2 | SYSTOLIC BLOOD PRESSURE: 128 MMHG | DIASTOLIC BLOOD PRESSURE: 68 MMHG | HEIGHT: 71 IN | WEIGHT: 169.06 LBS

## 2018-01-13 VITALS
WEIGHT: 173.5 LBS | HEART RATE: 79 BPM | SYSTOLIC BLOOD PRESSURE: 138 MMHG | BODY MASS INDEX: 24.89 KG/M2 | DIASTOLIC BLOOD PRESSURE: 70 MMHG

## 2018-01-13 NOTE — MISCELLANEOUS
Message   Recorded as Task   Date: 08/14/2017 10:24 AM, Created By: Viktoria Delgadillo   Task Name: Call Back   Assigned To: Mark GEE,TEAM   Regarding Patient: Gaby Lane, Status: In Progress   Joseph Plate - 14 Aug 2017 10:24 AM     TASK CREATED  Caller: Stevenellie Rajiv; [phone]  Pt's wife lmbrandee requesting to speak with Dr Escalante regarding 07/03/17 biopsy  Marva Burris - 14 Aug 2017 10:29 AM     TASK EDITED  WILL DIRECT TO Marva Ramirez - 14 Aug 2017 10:29 AM     TASK IN PROGRESS        Active Problems    1  Adenocarcinoma of kidney (189 0) (C64 9)   2  Benign essential tremor (333 1) (G25 0)   3  Gross hematuria (599 71) (R31 0)   4  Incontinence (788 30) (R32)   5  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 (188 2) (C67 2)   6  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)   7  Sleep disturbance (780 50) (G47 9)    Current Meds   1  AmLODIPine Besylate 2 5 MG Oral Tablet; TAKE 1 TABLET DAILY; Therapy: (Recorded:06May2015) to Recorded   2  Aspirin 81 MG TABS; Take 1 tablet daily; Therapy: (Recorded:53Xrn3641) to Recorded   3  Atorvastatin Calcium 20 MG Oral Tablet; TAKE 1 TABLET DAILY; Therapy: 11FBW5372 to Recorded   4  Metoprolol Tartrate 25 MG Oral Tablet; TAKE 1 TABLET TWICE DAILY; Therapy: (Recorded:06May2015) to Recorded   5  Multi-Vitamins TABS; TAKE 1 TABLET DAILY; Therapy: (Recorded:74Zrz1526) to Recorded   6  Ranexa 500 MG Oral Tablet Extended Release 12 Hour; Take 1 tablet twice daily; Therapy: (Recorded:18Ysb1597) to Recorded   7  Vitamin B-12 TABS; TAKE 1 TABLET DAILY; Therapy: (Recorded:41Lxd9738) to Recorded   8  Vitamin D3 2000 UNIT Oral Capsule; take 1 capsule daily; Therapy: (Recorded:12Nov2015) to Recorded    Allergies    1   Levaquin TABS    Signatures   Electronically signed by : Ramonita Josue RN; Aug 14 2017 10:30AM EST                       (Author)

## 2018-01-14 VITALS
HEART RATE: 68 BPM | HEIGHT: 71 IN | WEIGHT: 175.13 LBS | SYSTOLIC BLOOD PRESSURE: 122 MMHG | DIASTOLIC BLOOD PRESSURE: 76 MMHG | RESPIRATION RATE: 16 BRPM | BODY MASS INDEX: 24.52 KG/M2

## 2018-01-14 VITALS
DIASTOLIC BLOOD PRESSURE: 63 MMHG | BODY MASS INDEX: 25.05 KG/M2 | WEIGHT: 175 LBS | SYSTOLIC BLOOD PRESSURE: 138 MMHG | HEIGHT: 70 IN

## 2018-01-15 NOTE — MISCELLANEOUS
Message   Recorded as Task   Date: 11/15/2017 10:00 AM, Created By: David Treviño   Task Name: Call Back   Assigned To: Mark GEE,TEAM   Regarding Patient: Maribel Gudino, Status: In Progress   Comment:    David Treviño - 15 Nov 2017 10:00 AM     TASK CREATED  Caller: Lewis Barone, Spouse; Results Inquiry; [phone]  Looking for urine results from last week  Marva Burris - 15 Nov 2017 10:43 AM     TASK EDITED  OBTAINED RESULTS FROM [facility] DIRECTED TO Marva Perdomo - 15 Nov 2017 10:43 AM     TASK IN PROGRESS   Marva Burris - 15 Nov 2017 2:47 PM     TASK EDITED  CYTOLOGY NEG PER DR BLANCHARD  LMOM FOR PT WITH INFORMATION  REVIEWED RESULTS WITH WIFE  INFORMED OFFICE PT HAD MI, CURRENTLY IN [facility]  DR BLANCHARD NOTIFIED  1        1 Amended By: Leobardo Valverde; Nov 17 2017 9:35 AM EST    Active Problems   1  Adenocarcinoma of kidney (189 0) (C64 9)  2  Benign essential HTN (401 1) (I10)  3  Benign essential tremor (333 1) (G25 0)  4  CAD (coronary artery disease) (414 00) (I25 10)  5  Chronic stable angina (413 9) (I20 8)  6  Gross hematuria (599 71) (R31 0)  7  Hyperlipidemia (272 4) (E78 5)  8  Incontinence (788 30) (R32)  9  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 (188 2) (C67 2)  10  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)  11  RBBB (right bundle branch block) (426 4) (I45 10)  12  Sleep disturbance (780 50) (G47 9)    Current Meds  1  AmLODIPine Besylate 2 5 MG Oral Tablet; TAKE 1 TABLET DAILY; Therapy: (Recorded:95Xuq5650) to Recorded  2  Aspirin 81 MG TABS; Take 1 tablet daily; Therapy: (Recorded:75Sxw2914) to Recorded  3  Atorvastatin Calcium 20 MG Oral Tablet; TAKE 1 TABLET DAILY; Therapy: 05GRJ8604 to Recorded  4  CARBOplatin 150 MG/15ML Intravenous Solution; AS DIRECTED   LISTED AS 8   /CARBOPLATOIN AUC5 D1 EVERY 21;   Therapy: 01Fti9494 to Recorded  5   Gemzar 1 GM Intravenous Solution Reconstituted (Gemcitabine HCl); USE AS   DIRECTED; Therapy: 87Gnm1507 to Recorded  6  Metoprolol Tartrate 25 MG Oral Tablet; TAKE 1 TABLET TWICE DAILY; Therapy: (Recorded:55Ppp6553) to Recorded  7  Multi-Vitamins TABS; TAKE 1 TABLET DAILY; Therapy: (Recorded:44Dje9258) to Recorded  8  Ranexa 500 MG Oral Tablet Extended Release 12 Hour; Take 1 tablet twice daily; Therapy: (Recorded:59Nol3543) to Recorded  9  Vitamin B-12 TABS; TAKE 1 TABLET DAILY; Therapy: (Recorded:11Uwb5888) to Recorded  10  Vitamin D3 2000 UNIT Oral Capsule; take 1 capsule daily; Therapy: (Recorded:12Nov2015) to Recorded  11  Zofran 4 MG Oral Tablet (Ondansetron HCl); Therapy: (Recorded:43Gpf8895) to Recorded    Allergies   1   Levaquin TABS    Signatures   Electronically signed by : Leslye Jose RN; Nov 17 2017  9:35AM EST                       (Author)

## 2018-01-16 NOTE — MISCELLANEOUS
Message   Recorded as Task   Date: 07/24/2017 02:27 PM, Created By: Kathy Summers   Task Name: Call Back   Assigned To: Mark GEE,TEAM   Regarding Patient: Shari Jones, Status: Active   Comment:    Kathy Summers - 24 Jul 2017 2:27 PM     TASK CREATED  Caller: Dr Zoltan Harper; [phone]  Dr Mcgee Parents from [place] [address] would like to speak to Dr Aggie Crabtree when in the office next regarding mutual patient  Esther Rowe - 24 Jul 2017 3:08 PM     TASK EDITED  Dr Aggie Crabtree to be notified        Active Problems    1  Adenocarcinoma of kidney (189 0) (C64 9)   2  Benign essential tremor (333 1) (G25 0)   3  Gross hematuria (599 71) (R31 0)   4  Incontinence (788 30) (R32)   5  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 (188 2) (C67 2)   6  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)   7  Sleep disturbance (780 50) (G47 9)    Current Meds   1  AmLODIPine Besylate 2 5 MG Oral Tablet; TAKE 1 TABLET DAILY; Therapy: (Recorded:47Moq2790) to Recorded   2  Aspirin 81 MG TABS; Take 1 tablet daily; Therapy: (Recorded:57Zcd4641) to Recorded   3  Atorvastatin Calcium 20 MG Oral Tablet; TAKE 1 TABLET DAILY; Therapy: 51WAQ8723 to Recorded   4  Metoprolol Tartrate 25 MG Oral Tablet; TAKE 1 TABLET TWICE DAILY; Therapy: (Recorded:27Eka0521) to Recorded   5  Multi-Vitamins TABS; TAKE 1 TABLET DAILY; Therapy: (Recorded:20Rmj0672) to Recorded   6  Ranexa 500 MG Oral Tablet Extended Release 12 Hour; Take 1 tablet twice daily; Therapy: (Recorded:15Abl2820) to Recorded   7  Vitamin B-12 TABS; TAKE 1 TABLET DAILY; Therapy: (Recorded:87Ypv4325) to Recorded   8  Vitamin D3 2000 UNIT Oral Capsule; take 1 capsule daily; Therapy: (Recorded:25Bfv2848) to Recorded    Allergies    1   Levaquin TABS    Signatures   Electronically signed by : Arvin Lopez, ; Jul 24 2017  3:08PM EST                       (Author)

## 2018-01-18 NOTE — MISCELLANEOUS
Message   Recorded as Task   Date: 09/13/2017 12:41 PM, Created By: Viktoria Delgadillo   Task Name: Call Back   Assigned To: Mark YeB,TEAM   Regarding Patient: Gaby Lane, Status: Active   CommentEl Plate - 13 Sep 2017 12:41 PM     TASK CREATED  Caller: Yun Vance; [phone]  Wife cx'd 10/05/17 appointment asking to speak with clinical reason not given  Marva Burris - 13 Sep 2017 1:23 PM     TASK EDITED  Dr Vicky Burt states platelets too low to do cysto  Will repeat PET scan after course of chemo and then decide if pt able to have  Wife states notes can be obtained on Care Everywhere  Will notify Dr Willy Tam  Active Problems    1  Adenocarcinoma of kidney (189 0) (C64 9)   2  Benign essential HTN (401 1) (I10)   3  Benign essential tremor (333 1) (G25 0)   4  CAD (coronary artery disease) (414 00) (I25 10)   5  Chronic stable angina (413 9) (I20 8)   6  Gross hematuria (599 71) (R31 0)   7  Hyperlipidemia (272 4) (E78 5)   8  Incontinence (788 30) (R32)   9  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 (188 2) (C67 2)   10  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)   11  RBBB (right bundle branch block) (426 4) (I45 10)   12  Sleep disturbance (780 50) (G47 9)    Current Meds   1  AmLODIPine Besylate 2 5 MG Oral Tablet; TAKE 1 TABLET DAILY; Therapy: (Recorded:06May2015) to Recorded   2  Aspirin 81 MG TABS; Take 1 tablet daily; Therapy: (Recorded:76Pqx3774) to Recorded   3  Atorvastatin Calcium 20 MG Oral Tablet; TAKE 1 TABLET DAILY; Therapy: 80ICF3517 to Recorded   4  CARBOplatin 150 MG/15ML Intravenous Solution; AS DIRECTED   LISTED AS 8   /CARBOPLATOIN AUC5 D1 EVERY 21;   Therapy: 20Vfi6367 to Recorded   5  Gemzar 1 GM Intravenous Solution Reconstituted (Gemcitabine HCl); USE AS   DIRECTED; Therapy: 87Vpv6627 to Recorded   6  Metoprolol Tartrate 25 MG Oral Tablet; TAKE 1 TABLET TWICE DAILY;    Therapy: (Recorded:06May2015) to Recorded   7  Multi-Vitamins TABS; TAKE 1 TABLET DAILY; Therapy: (Recorded:60Gyj8216) to Recorded   8  Ranexa 500 MG Oral Tablet Extended Release 12 Hour; Take 1 tablet twice daily; Therapy: (Recorded:85Msy8560) to Recorded   9  Vitamin B-12 TABS; TAKE 1 TABLET DAILY; Therapy: (Recorded:91Jca6465) to Recorded   10  Vitamin D3 2000 UNIT Oral Capsule; take 1 capsule daily; Therapy: (Recorded:20Mpv7230) to Recorded    Allergies    1   Levaquin TABS    Signatures   Electronically signed by : Myah Perdue RN; Sep 13 2017  1:24PM EST                       (Author)

## 2018-01-22 VITALS
DIASTOLIC BLOOD PRESSURE: 70 MMHG | WEIGHT: 174 LBS | HEIGHT: 70 IN | BODY MASS INDEX: 24.91 KG/M2 | SYSTOLIC BLOOD PRESSURE: 140 MMHG

## 2018-01-22 VITALS
BODY MASS INDEX: 25.05 KG/M2 | DIASTOLIC BLOOD PRESSURE: 76 MMHG | HEIGHT: 70 IN | WEIGHT: 175 LBS | SYSTOLIC BLOOD PRESSURE: 120 MMHG

## 2018-01-23 NOTE — MISCELLANEOUS
Message     Recorded as Task   Date: 01/10/2018 11:55 AM, Created By: Callie Lopez   Task Name: Care Coordination   Assigned To: Mark GEE,TEAM   Regarding Patient: Riena Ayers, Status: Active   Comment:    Callie Lopez - 10 Bertrand 2018 11:55 AM     TASK CREATED  Received a call from [employer] asking to speak with the nurse in regards to paperwork that was faxed over can they receive a call back at  please and thank you  Elena Spear - 10 Bertrand 2018 1:23 PM     TASK EDITED  I CALLED BACK [employer], THERE WAS PAPERWORK SENT OVER WITHOUT A DATE AND SIGNATURE, WHICH REQUIRED  THEY ARE RE- FAXING IT BACK TO US FOR CORRECTION  Active Problems    1  Abnormal CT scan (793 99) (R93 8)   2  Adenocarcinoma of kidney (189 0) (C64 9)   3  Benign essential HTN (401 1) (I10)   4  Benign essential tremor (333 1) (G25 0)   5  CAD (coronary artery disease) (414 00) (I25 10)   6  Chronic stable angina (413 9) (I20 8)   7  Gross hematuria (599 71) (R31 0)   8  Hyperlipidemia (272 4) (E78 5)   9  Incontinence (788 30) (R32)   10  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 (188 2) (C67 2)   11  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)   12  Mitral regurgitation (424 0) (I34 0)   13  RBBB (right bundle branch block) (426 4) (I45 10)   14  Sleep disturbance (780 50) (G47 9)    Current Meds   1  AmLODIPine Besylate 2 5 MG Oral Tablet; TAKE 1 TABLET DAILY; Therapy: (Recorded:67Fpu6826) to Recorded   2  Aspirin 81 MG TABS; Take 1 tablet daily; Therapy: (Recorded:80Wkd1864) to Recorded   3  Atorvastatin Calcium 20 MG Oral Tablet; TAKE 1 TABLET DAILY; Therapy: 42TVV1051 to Recorded   4  CARBOplatin 150 MG/15ML Intravenous Solution; AS DIRECTED   LISTED AS 8   /CARBOPLATOIN AUC5 D1 EVERY 21;   Therapy: 43Maw9583 to Recorded   5  Gemzar 1 GM Intravenous Solution Reconstituted (Gemcitabine HCl); USE AS   DIRECTED; Therapy: 51Aca6633 to Recorded   6  Isosorbide Mononitrate ER 30 MG Oral Tablet Extended Release 24 Hour; TAKE 1   TABLET DAILY; Therapy: 83CDJ2297 to (ZZSKVECX:30LTN8951) Recorded   7  Metoprolol Tartrate 25 MG Oral Tablet; TAKE 1 TABLET TWICE DAILY; Therapy: (Recorded:80Nrg8075) to Recorded   8  Multi-Vitamins TABS; TAKE 1 TABLET DAILY; Therapy: (Recorded:36Xaw0852) to Recorded   9  Ranexa 500 MG Oral Tablet Extended Release 12 Hour; Take 1 tablet twice daily; Therapy: (Recorded:23Rtf4936) to Recorded   10  Vitamin B-12 TABS; TAKE 1 TABLET DAILY; Therapy: (Recorded:65Nmx5538) to Recorded   11  Vitamin D3 2000 UNIT Oral Capsule; take 1 capsule daily; Therapy: (Recorded:08Fhv6010) to Recorded   12  Zofran 4 MG Oral Tablet (Ondansetron HCl); Therapy: (Recorded:84Yju3509) to Recorded    Allergies    1   Levaquin TABS    Signatures   Electronically signed by : Sylvie Matson, ; Bertrand 10 2018  1:24PM EST                       (Author)

## 2018-01-29 RX ORDER — GEMCITABINE HYDROCHLORIDE 1 G/1
INJECTION, POWDER, LYOPHILIZED, FOR SOLUTION INTRAVENOUS
COMMUNITY
Start: 2017-08-22 | End: 2018-06-12 | Stop reason: ALTCHOICE

## 2018-01-29 RX ORDER — CARBOPLATIN 10 MG/ML
INJECTION INTRAVENOUS
COMMUNITY
Start: 2017-08-22 | End: 2018-06-12 | Stop reason: ALTCHOICE

## 2018-01-29 RX ORDER — ONDANSETRON HYDROCHLORIDE 8 MG/1
TABLET, FILM COATED ORAL
COMMUNITY
Start: 2017-08-22 | End: 2018-01-31 | Stop reason: CLARIF

## 2018-01-29 RX ORDER — AMLODIPINE BESYLATE 2.5 MG/1
2.5 TABLET ORAL
COMMUNITY
Start: 2015-04-06 | End: 2018-06-12 | Stop reason: ALTCHOICE

## 2018-01-29 RX ORDER — PROCHLORPERAZINE MALEATE 10 MG
10 TABLET ORAL EVERY 6 HOURS
COMMUNITY
Start: 2017-08-22 | End: 2018-07-20 | Stop reason: ALTCHOICE

## 2018-01-31 ENCOUNTER — OFFICE VISIT (OUTPATIENT)
Dept: CARDIOLOGY CLINIC | Facility: CLINIC | Age: 73
End: 2018-01-31
Payer: MEDICARE

## 2018-01-31 VITALS
HEART RATE: 63 BPM | SYSTOLIC BLOOD PRESSURE: 122 MMHG | HEIGHT: 70 IN | BODY MASS INDEX: 24.11 KG/M2 | DIASTOLIC BLOOD PRESSURE: 68 MMHG | WEIGHT: 168.4 LBS | RESPIRATION RATE: 16 BRPM

## 2018-01-31 DIAGNOSIS — E78.5 HYPERLIPIDEMIA, UNSPECIFIED HYPERLIPIDEMIA TYPE: ICD-10-CM

## 2018-01-31 DIAGNOSIS — I25.10 CAD (CORONARY ARTERY DISEASE): Primary | ICD-10-CM

## 2018-01-31 PROCEDURE — 99214 OFFICE O/P EST MOD 30 MIN: CPT | Performed by: NURSE PRACTITIONER

## 2018-01-31 RX ORDER — ISOSORBIDE MONONITRATE 60 MG/1
60 TABLET, EXTENDED RELEASE ORAL 2 TIMES DAILY
Qty: 60 TABLET | Refills: 0 | Status: SHIPPED | OUTPATIENT
Start: 2018-01-31 | End: 2018-02-20 | Stop reason: SDUPTHER

## 2018-01-31 RX ORDER — ISOSORBIDE MONONITRATE 30 MG/1
TABLET, EXTENDED RELEASE ORAL
Refills: 0 | COMMUNITY
Start: 2017-12-16 | End: 2018-01-31 | Stop reason: CLARIF

## 2018-01-31 RX ORDER — METOPROLOL TARTRATE 50 MG/1
50 TABLET, FILM COATED ORAL EVERY 12 HOURS SCHEDULED
Qty: 60 TABLET | Refills: 0 | Status: SHIPPED | OUTPATIENT
Start: 2018-01-31 | End: 2018-02-20 | Stop reason: SDUPTHER

## 2018-01-31 RX ORDER — FUROSEMIDE 20 MG/1
20 TABLET ORAL EVERY OTHER DAY
Qty: 30 TABLET | Refills: 0 | Status: SHIPPED | OUTPATIENT
Start: 2018-01-31 | End: 2018-09-19 | Stop reason: ALTCHOICE

## 2018-01-31 RX ORDER — CEFDINIR 300 MG/1
CAPSULE ORAL
Refills: 0 | COMMUNITY
Start: 2017-11-18 | End: 2018-06-12 | Stop reason: ALTCHOICE

## 2018-01-31 NOTE — PATIENT INSTRUCTIONS
Maintain a 2 gram daily sodium diet and 1500 ml daily fluid restriction  Check daily weights  If you gained 3 pounds in one day, 5 pounds in one week, or experience worsening shortness of breath or increasing lower leg swelling  Please call the heart failure office at [phone]

## 2018-01-31 NOTE — PROGRESS NOTES
Heart Failure Office Visit- Nikita Cardozo 74 y o  male MRN: [id number]    [facility]  Mr Becky Cunningham is a 74year old male with a known past medical history of prostate CA sp prostatectomy, bladder CA treated with Chemotherapy, CAD, CABG, Known disease of LCx, attempted intervention without success, Angina, chronic anemia,HTN,  HLD, RBBB, sp nephrectomy, solitary kidney,  CKD III baseline creat 1 3-1 4  Mr Mickey Pickard  was admitted at [facility] in November 2017 with chest pain, which sounds like typical angina  It does not seem like he had spilled troponins, and aggressive medical management was recommended  He was admitted once again in December 2017, without troponin elevation, and recurrent chest pain, at which time medical management was amplified with isosorbide, Ranexa, aspirin, metoprolol, sublingual nitroglycerin  He did have a nuclear stress test in November 2017, revealing significant reversible ischemia in the mid and basal lateral myocardial wall segments with ejection fraction 57%  Echocardiogram at that same time revealed a normal ejection fraction  Mr Mickey Pickard was recently admitted to [facility] on 1/06-1/11/18 with CP, thrombocytopenia, anemia and Non STEMI type I  He was transferred from [address] [address] due to elevated troponin to [facility]   around 6:00 p m  he went out to his mailbox, and started getting chest tightness, which was significantly alleviated but did not resolve completely with 2 sublingual nitroglycerin  He had recurrent chest pain while eating dinner later and took another sublingual nitroglycerin with relief but not complete resolution  He had recurrent pain later in the evening, which prompted him to come to the emergency department  ECG revealed bifascicular block with nonspecific ST segment abnormality  He was transferred to [facility] for cardiac catheterization    Cardiac catheterization on 1/9/2018 - multi vessel CAD with grafts patent to the LAD and distal RCA  Symptoms/demand ischemia most likely due to occluded distal OM and occluded D1, which are not felt to be suitable to percutaneous revascularization  Continued medical therapy was indicated  There is anticipation of additional episodes of ischemia with moderate troponin release in the future  However invasive studies are probably best reserved for instances of objective evidence of anterior or inferior ischemia  In terms of details, Left main: Normal   LAD: There was a 100 % stenosis in mid vessel  The mid and distal LAD fill via a LIMA conduit  Circumflex: The vessel was normal sized and gives rise to a major OM branch  The OM is completely occluded distally, and fills by collateral flow from the grafted RCA  RCA: The vessel was normal sized and dominant  There was a total occlusion in mid vessel  The distal RCA and PDA fill via an SVG  Graft to the LAD: The graft was a LIMA  The body of the graft and the anastomosis were normal  The grafted mid and distal LAD filled well and were free of critical disease  The LAD sent collateral flow to D1  Graft to the distal RCA: The graft was a saphenous vein graft from the ascending aorta  The body of the graft and the distal anastomosis were normal  The grafted distal RCA/PDA filled well and were free of critical disease  The SVG/RCA sent collateral flow to the occluded OM  He did have some bleeding at the groin site following the procedure which resolve with pressure dressing and epinephrine injection    CXR on admission showed mild CHF  No TTE done during this admission  He was found to have thrombocytopenia,  Hematology was consulted  This was felt to be related to chemotherapy with Gemzar and   Nuelasta  He required transfusion of platelets  Mr Red Litten was discharged home on increased Imdur 60mg BID, Metoprolol tartrate 50mg BID, Ranexa 1000mg BID, atorvastatin 40mg daily  He was started on Lasix 20mg daily  TodayMr  Red Litten presents to our office for a recent hospitalization follow up visit  He is accompanied by his wife  Mr Red Litten denies any re occurrence of CP, palpitations, lightheadedness or dizziness  He admits to dyspnea with ambulation and fatigue  He admits to bladder incontinence  His weight is stable at home  He is avoiding high sodium foods  His Chemotherapy treatment is completed  Patient Active Problem List   Diagnosis    Leukocytosis    Chronic anemia    Chronic stable angina (HCC)    CAD (coronary artery disease)    Hyperlipidemia    RBBB    Transitional cell bladder cancer ([facility] )    NSTEMI (non-ST elevated myocardial infarction) ([facility] )    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    Essential hypertension    CKD (chronic kidney disease), stage III       Review of Systems   Constitution: Positive for malaise/fatigue  HENT: Negative  Eyes: Negative  Cardiovascular: Negative  Respiratory: Positive for shortness of breath  Endocrine: Negative  Skin: Negative  Musculoskeletal: Negative  Genitourinary: Positive for bladder incontinence  Neurological: Negative  Psychiatric/Behavioral: Negative  Subjective:   Per HPI    Objective:     Vitals: Blood pressure 122/68, pulse 63, resp  rate 16, height 5' 10" (1 778 m), weight 76 4 kg (168 lb 6 4 oz)  , Body mass index is 24 16 kg/m² ,   Wt Readings from Last 3 Encounters:   01/31/18 76 4 kg (168 lb 6 4 oz)   01/06/18 79 8 kg (175 lb 14 8 oz)   01/05/18 77 1 kg (170 lb)         Physical Exam:  Vitals:    01/31/18 1027   BP: 122/68   BP Location: Left arm   Patient Position: Sitting   Cuff Size: Standard   Pulse: 63   Resp: 16   Weight: 76 4 kg (168 lb 6 4 oz)   Height: 5' 10" (1 778 m)       GEN: Ileana Walsh appears well, alert and oriented x 3, pleasant and cooperative   HEENT: pupils equal, round, and reactive to light; extraocular muscles intact  NECK: supple, no carotid bruits   HEART: regular rhythm, normal S1 and S2, no murmurs, clicks, gallops or rubs, JVP is flat, + V wave   LUNGS: clear to auscultation bilaterally; no wheezes, rales, or rhonchi   ABDOMEN: normal bowel sounds, soft, no tenderness, no distention  EXTREMITIES: peripheral pulses normal; no clubbing, cyanosis, or edema  NEURO: no focal findings   SKIN: normal without suspicious lesions on exposed skin      Current Outpatient Prescriptions:     aspirin 81 mg chewable tablet, Chew 81 mg daily, Disp: , Rfl:     atorvastatin (LIPITOR) 40 mg tablet, Take 1 tablet by mouth every evening (Patient taking differently: Take 20 mg by mouth every evening  ), Disp: 30 tablet, Rfl: 0    Cholecalciferol (D3-1000) 1000 units capsule, Take 1,000 Units by mouth daily, Disp: , Rfl:     Cyanocobalamin (B-12) 1000 MCG CAPS, Take by mouth daily, Disp: , Rfl:     furosemide (LASIX) 20 mg tablet, Take 1 tablet by mouth daily, Disp: 30 tablet, Rfl: 0    isosorbide mononitrate (IMDUR) 60 mg 24 hr tablet, Take 1 tablet by mouth 2 (two) times a day, Disp: 60 tablet, Rfl: 0    metoprolol tartrate (LOPRESSOR) 50 mg tablet, Take 1 tablet by mouth every 12 (twelve) hours, Disp: 60 tablet, Rfl: 0    Multiple Vitamin (MULTIVITAMIN) tablet, Take 1 tablet by mouth daily, Disp: , Rfl:     ondansetron (ZOFRAN) 8 mg tablet, Take by mouth every 8 (eight) hours as needed for nausea or vomiting, Disp: , Rfl:     ranolazine (RANEXA) 1000 MG SR tablet, Take 1 tablet by mouth 2 (two) times a day, Disp: 60 tablet, Rfl: 0    amLODIPine (NORVASC) 2 5 mg tablet, Take 2 5 mg by mouth, Disp: , Rfl:     CARBOplatin (PARAPLATIN) 150 mg/15 mL, Infuse into a venous catheter, Disp: , Rfl:     cefdinir (OMNICEF) 300 mg capsule, take 1 capsule by mouth twice a day for 5 days, Disp: , Rfl: 0    econazole nitrate 1 % cream, , Disp: , Rfl: 0    gemcitabine (GEMZAR) 1 g, Infuse into a venous catheter, Disp: , Rfl:     isosorbide mononitrate (IMDUR) 30 mg 24 hr tablet, , Disp: , Rfl: 0    LORazepam (ATIVAN) 0 5 mg tablet, Take by mouth every 8 (eight) hours as needed for anxiety, Disp: , Rfl:     metoprolol tartrate (LOPRESSOR) 25 mg tablet, , Disp: , Rfl:     Nitroglycerin (NITROTAB SL), Place under the tongue every 5 (five) minutes as needed "never used", Disp: , Rfl:     ondansetron (ZOFRAN) 8 mg tablet, 1 tablet (8 mg) BID days 2 and 3 then 1 tablet (8 mg) every 8 hours as needed for nausea, Disp: , Rfl:     prochlorperazine (COMPAZINE) 10 mg tablet, Take 10 mg by mouth every 6 (six) hours as needed for nausea or vomiting, Disp: , Rfl:     prochlorperazine (COMPAZINE) 10 mg tablet, Take 10 mg by mouth every 6 (six) hours, Disp: , Rfl:           Assessment/ Plan:  1  CAD-no symptoms of angina,  continue Imdur 60mg BID, Ranexa 1000mg BID, Metoprolol tartrate 50mg BID, ASA 81mg daily, and atorvastatin 40mg daily, low fat low cholesterol diet  Follow up with Dr Aislinn Walton in one month  2  HTN- controlled on current medical regimen  3  HLD- take increase dose of Atrovastatin 40mg daily, low fat low cholesterol diet  4  Chronic heart failure -  2 D echocardiogram to evaluate wall function and valvular function given, decrease Lasix 20mg to every other day, Maintain a 2 gram daily sodium diet and 1500 ml daily fluid restriction  Check daily weights  If you gained 3 pounds in one day, 5 pounds in one week, or experience worsening shortness of breath or increasing lower leg swelling    Please call the heart failure office at [phone]     5  CKD III baseline creat 1 2-1 3 reported Creat elevated to 1 35, decrease Lasix to every other day

## 2018-02-02 ENCOUNTER — TELEPHONE (OUTPATIENT)
Dept: CARDIOLOGY CLINIC | Facility: CLINIC | Age: 73
End: 2018-02-02

## 2018-02-02 NOTE — TELEPHONE ENCOUNTER
Received a call from [facility] stating Imdur 60mg BID should be 120mg daily  S/W NP who advised to keep 60mg BID   S/W [employer], advised

## 2018-02-05 ENCOUNTER — OFFICE VISIT (OUTPATIENT)
Dept: UROLOGY | Facility: MEDICAL CENTER | Age: 73
End: 2018-02-05
Payer: MEDICARE

## 2018-02-05 ENCOUNTER — TELEPHONE (OUTPATIENT)
Dept: NEPHROLOGY | Facility: CLINIC | Age: 73
End: 2018-02-05

## 2018-02-05 VITALS
WEIGHT: 169 LBS | DIASTOLIC BLOOD PRESSURE: 77 MMHG | SYSTOLIC BLOOD PRESSURE: 136 MMHG | HEIGHT: 70 IN | BODY MASS INDEX: 24.2 KG/M2

## 2018-02-05 DIAGNOSIS — C67.9 TRANSITIONAL CELL BLADDER CANCER (HCC): Primary | Chronic | ICD-10-CM

## 2018-02-05 DIAGNOSIS — N18.9 CHRONIC KIDNEY DISEASE, UNSPECIFIED CKD STAGE: Primary | ICD-10-CM

## 2018-02-05 PROBLEM — N39.3 STRESS INCONTINENCE: Status: ACTIVE | Noted: 2018-02-05

## 2018-02-05 PROCEDURE — 52000 CYSTOURETHROSCOPY: CPT | Performed by: UROLOGY

## 2018-02-05 PROCEDURE — 99213 OFFICE O/P EST LOW 20 MIN: CPT | Performed by: UROLOGY

## 2018-02-05 NOTE — LETTER
February 5, 2018     MD Rupesh Nobles   [address]  [address] [facility]    Patient: Corrie Benites   YOB: 1945   Date of Visit: 2/5/2018       Dear Dr Luana Marks: Thank you for referring Melanie Payan to me for evaluation  Below are my notes for this consultation  If you have questions, please do not hesitate to call me  I look forward to following your patient along with you  Sincerely,        Melquiades Hernandez MD        CC: MD Melquiades Shoemaker MD  2/5/2018  9:28 AM  Signed  Assessment/Plan:      Diagnoses and all orders for this visit:    Transitional cell bladder cancer ([facility] )  -     Cytology, urine; Future        1  Urine cytology at the lab this week   2  Await PET scan   3  If there is malignant cells in the urine cytology will start BCG  4  If cytology negative, then cysto in three months  Subjective:     Patient ID: Corrie Benites is a 74 y o  male  Follow-up for transitional cell cancer, see prior note regarding the left nephro ureterectomy, chemotherapy by Dr Hernando Peterson  Carcinoma in situ of the bladder with last cytology being indeterminate  Passive incontinence about the same  CT scan last week was negative for any new lymph node metastases, PET scan is pending  Review of Systems   Constitutional: Positive for fatigue  Cardiovascular:        Recent MI   Genitourinary: Positive for enuresis  Negative for hematuria  Objective:     Physical Exam   Constitutional: He appears well-developed and well-nourished  Pulmonary/Chest: Effort normal    Abdominal: Soft  Genitourinary: Testes normal and penis normal      Cystoscopy  Date/Time: 2/5/2018 9:24 AM  Performed by: Vee Merchant  Authorized by: Vee Merchant     Procedure details: cystoscopy    Patient tolerance: Patient tolerated the procedure well with no immediate complications        The patient was carefully  positioned supine on the examining table  Sterile preparation was performed on the urethra  Xylocaine jelly was instilled and left  Indwelling for the procedure  The 13 Thai flexible cystoscope was passed with the following findings:      Urethra:  No strictures    Prostate:  Surgically absent    Bladder:  Scattered erythema posterior and lateral walls, sloughing epithelium on anterior wall  No specific exophytic tumors     Residual urine:  50 mL    Patient tolerated the procedure well and was escorted from the examining table

## 2018-02-05 NOTE — PROGRESS NOTES
Assessment/Plan:      Diagnoses and all orders for this visit:    Transitional cell bladder cancer (HCC)  -     Cytology, urine; Future        1  Urine cytology at the lab this week   2  Await PET scan   3  If there is malignant cells in the urine cytology will start BCG  4  If cytology negative, then cysto in three months  Subjective:     Patient ID: Steff Moulton is a 74 y o  male  Follow-up for transitional cell cancer, see prior note regarding the left nephro ureterectomy, chemotherapy by Dr Lissette Singh  Carcinoma in situ of the bladder with last cytology being indeterminate  Passive incontinence about the same  CT scan last week was negative for any new lymph node metastases, PET scan is pending  Review of Systems   Constitutional: Positive for fatigue  Cardiovascular:        Recent MI   Genitourinary: Positive for enuresis  Negative for hematuria  Objective:     Physical Exam   Constitutional: He appears well-developed and well-nourished  Pulmonary/Chest: Effort normal    Abdominal: Soft  Genitourinary: Testes normal and penis normal      Cystoscopy  Date/Time: 2/5/2018 9:24 AM  Performed by: Yumiko Rodriguez  Authorized by: Yumiko Rodriguez     Procedure details: cystoscopy    Patient tolerance: Patient tolerated the procedure well with no immediate complications        The patient was carefully  positioned supine on the examining table  Sterile preparation was performed on the urethra  Xylocaine jelly was instilled and left  Indwelling for the procedure  The 13 [place] flexible cystoscope was passed with the following findings:      Urethra:  No strictures    Prostate:  Surgically absent    Bladder:  Scattered erythema posterior and lateral walls, sloughing epithelium on anterior wall  No specific exophytic tumors     Residual urine:  50 mL    Patient tolerated the procedure well and was escorted from the examining table

## 2018-02-06 ENCOUNTER — APPOINTMENT (OUTPATIENT)
Dept: LAB | Facility: MEDICAL CENTER | Age: 73
End: 2018-02-06
Attending: UROLOGY
Payer: MEDICARE

## 2018-02-06 ENCOUNTER — TRANSCRIBE ORDERS (OUTPATIENT)
Dept: ADMINISTRATIVE | Facility: HOSPITAL | Age: 73
End: 2018-02-06

## 2018-02-06 ENCOUNTER — OFFICE VISIT (OUTPATIENT)
Dept: NEPHROLOGY | Facility: CLINIC | Age: 73
End: 2018-02-06
Payer: MEDICARE

## 2018-02-06 VITALS
HEIGHT: 70 IN | DIASTOLIC BLOOD PRESSURE: 78 MMHG | WEIGHT: 167.8 LBS | BODY MASS INDEX: 24.02 KG/M2 | HEART RATE: 78 BPM | SYSTOLIC BLOOD PRESSURE: 132 MMHG

## 2018-02-06 DIAGNOSIS — N18.31 CHRONIC KIDNEY DISEASE (CKD) STAGE G3A/A1, MODERATELY DECREASED GLOMERULAR FILTRATION RATE (GFR) BETWEEN 45-59 ML/MIN/1.73 SQUARE METER AND ALBUMINURIA CREATININE RATIO LESS THAN 30 MG/G (HCC): ICD-10-CM

## 2018-02-06 DIAGNOSIS — N17.9 ACUTE RENAL FAILURE, UNSPECIFIED ACUTE RENAL FAILURE TYPE (HCC): Primary | ICD-10-CM

## 2018-02-06 DIAGNOSIS — C67.9 TRANSITIONAL CELL BLADDER CANCER (HCC): Chronic | ICD-10-CM

## 2018-02-06 DIAGNOSIS — I10 ESSENTIAL HYPERTENSION: ICD-10-CM

## 2018-02-06 DIAGNOSIS — Z90.5 SOLITARY KIDNEY, ACQUIRED: ICD-10-CM

## 2018-02-06 DIAGNOSIS — D64.9 CHRONIC ANEMIA: Chronic | ICD-10-CM

## 2018-02-06 DIAGNOSIS — N18.9 CHRONIC KIDNEY DISEASE, UNSPECIFIED CKD STAGE: ICD-10-CM

## 2018-02-06 LAB
ANION GAP SERPL CALCULATED.3IONS-SCNC: 8 MMOL/L (ref 4–13)
BUN SERPL-MCNC: 26 MG/DL (ref 5–25)
CALCIUM SERPL-MCNC: 9.1 MG/DL (ref 8.3–10.1)
CHLORIDE SERPL-SCNC: 101 MMOL/L (ref 100–108)
CO2 SERPL-SCNC: 27 MMOL/L (ref 21–32)
CREAT SERPL-MCNC: 1.41 MG/DL (ref 0.6–1.3)
GFR SERPL CREATININE-BSD FRML MDRD: 49 ML/MIN/1.73SQ M
GLUCOSE SERPL-MCNC: 197 MG/DL (ref 65–140)
POTASSIUM SERPL-SCNC: 4.4 MMOL/L (ref 3.5–5.3)
SODIUM SERPL-SCNC: 136 MMOL/L (ref 136–145)

## 2018-02-06 PROCEDURE — 99214 OFFICE O/P EST MOD 30 MIN: CPT | Performed by: NURSE PRACTITIONER

## 2018-02-06 PROCEDURE — 36415 COLL VENOUS BLD VENIPUNCTURE: CPT

## 2018-02-06 PROCEDURE — 88112 CYTOPATH CELL ENHANCE TECH: CPT | Performed by: PATHOLOGY

## 2018-02-06 PROCEDURE — 80048 BASIC METABOLIC PNL TOTAL CA: CPT

## 2018-02-06 PROCEDURE — 88112 CYTOPATH CELL ENHANCE TECH: CPT

## 2018-02-06 NOTE — PROGRESS NOTES
OFFICE FOLLOW UP - Nephrology   Quintin Kirk 74 y o  male MRN: [id number]       ASSESSMENT and PLAN:  Lenard Collazo was seen today for hospital follow-up HARRIET  Diagnoses and all orders for this visit:    Acute renal failure, unspecified acute renal failure type [facility]): Resolved prior to discharge  -Likely secondary to [address]  Peak creatinine 1 6    Chronic kidney disease (CKD) stage G3a/A1, moderately decreased glomerular filtration rate (GFR) between 45-59 mL/min/1 73 square meter and albuminuria creatinine ratio less than 30 mg/g  -Likely secondary to HTN and CRS in the setting of solitary Kidney  -Baseline creatinine 1 3-1 4  -Avoid nephrotoxins  -Avoid hypotension  -Will see in 6 months for ongoing renal management     Essential hypertension:  BP acceptable  -No change in current hypertensive medication management  -For ECHO Thursday per cardiology    Solitary kidney, acquired: History of RCC  -S/P chemo  Last dose on 01/03/2018   -For PET scan next week per Oncology    Anemia:  Likely secondary to chronic disease  -Stable  -Will check iron stores with next appointment    Volume: Examines euvolemic      Patient Instructions   All questions asked and answered  The patient has been instructed to call office at [phone] with any questions or concerns  The patient is in agreement  -Will see in 6 months for renal management with updated blood work  -Eduction material given for [employer] [facility]" and "Monitoring kidney Health"        HPI: Quintin Kirk is a 74 y o  male who is here for Follow-up [facility])    The patient was recently admitted and underwent cardiac cath and was found to have MVD; now medically managed  He was found to have HARRIET that resolved prior to discharge and CKD  The patient currently has no complaints at this time and is feeling well  Patient denies any chest pain, shortness of breath, nausea, vomiting, change in appetite, urinary issues or swelling   The last blood work was done this am and still pending at this time  Once reviewed further recommendations will be forthcoming  Per wife, he has finished chemo for RCC in January and is for repeat PET ct scan  He had cardiology follow up and is for ECHO later this week  ROS:   All the systems were reviewed and were negative except as documented on the HPI  Allergies: Levaquin [levofloxacin];  Percocet [oxycodone-acetaminophen]; and Lovastatin    Medications:   Current Outpatient Prescriptions:     amLODIPine (NORVASC) 2 5 mg tablet, Take 2 5 mg by mouth, Disp: , Rfl:     aspirin 81 mg chewable tablet, Chew 81 mg daily, Disp: , Rfl:     atorvastatin (LIPITOR) 40 mg tablet, Take 1 tablet by mouth every evening (Patient taking differently: Take 20 mg by mouth every evening  ), Disp: 30 tablet, Rfl: 0    CARBOplatin (PARAPLATIN) 150 mg/15 mL, Infuse into a venous catheter, Disp: , Rfl:     cefdinir (OMNICEF) 300 mg capsule, take 1 capsule by mouth twice a day for 5 days, Disp: , Rfl: 0    Cholecalciferol (D3-1000) 1000 units capsule, Take 1,000 Units by mouth daily, Disp: , Rfl:     Cyanocobalamin (B-12) 1000 MCG CAPS, Take by mouth daily, Disp: , Rfl:     econazole nitrate 1 % cream, , Disp: , Rfl: 0    furosemide (LASIX) 20 mg tablet, Take 1 tablet (20 mg total) by mouth every other day, Disp: 30 tablet, Rfl: 0    gemcitabine (GEMZAR) 1 g, Infuse into a venous catheter, Disp: , Rfl:     isosorbide mononitrate (IMDUR) 60 mg 24 hr tablet, Take 1 tablet (60 mg total) by mouth 2 (two) times a day, Disp: 60 tablet, Rfl: 0    LORazepam (ATIVAN) 0 5 mg tablet, Take by mouth every 8 (eight) hours as needed for anxiety, Disp: , Rfl:     metoprolol tartrate (LOPRESSOR) 50 mg tablet, Take 1 tablet (50 mg total) by mouth every 12 (twelve) hours, Disp: 60 tablet, Rfl: 0    Multiple Vitamin (MULTIVITAMIN) tablet, Take 1 tablet by mouth daily, Disp: , Rfl:     Nitroglycerin (NITROTAB SL), Place under the tongue every 5 (five) minutes as needed "never used", Disp: , Rfl:     ondansetron (ZOFRAN) 8 mg tablet, Take by mouth every 8 (eight) hours as needed for nausea or vomiting, Disp: , Rfl:     prochlorperazine (COMPAZINE) 10 mg tablet, Take 10 mg by mouth every 6 (six) hours as needed for nausea or vomiting, Disp: , Rfl:     prochlorperazine (COMPAZINE) 10 mg tablet, Take 10 mg by mouth every 6 (six) hours, Disp: , Rfl:     ranolazine (RANEXA) 1000 MG SR tablet, Take 1 tablet by mouth 2 (two) times a day, Disp: 60 tablet, Rfl: 0    Past Medical History:   Diagnosis Date    Anesthesia     "can't urinate after surgery"    Basal cell carcinoma     Coronary artery disease     Essential tremor     of head and hands bilat    Hearing aid worn     bilat    History of kidney stones     History of pneumonia     childhood    History of prostate cancer     Hx of radiation therapy     2007 for after prostate surgery    Hyperlipidemia     Hypertension     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 ([address] )     and" left kidney and left ureter removed"    Urinary incontinence     wears Depends    Wears glasses     Wears partial dentures     lower     Past Surgical History:   Procedure Laterality Date    APPENDECTOMY      BACK SURGERY      lumbar herniated disc repair    CARDIAC CATHETERIZATION      CORONARY ARTERY BYPASS GRAFT      x2 in 2004   [address] [place] [address] [address] N/A 7/3/2017    Procedure: BLADDER BIOPSY;  Surgeon: Adrain Phalen, MD;  Location: [facility];  Service: Urology    CYSTOSCOPY W/ RETROGRADES Right 7/3/2017    Procedure: CYSTOSCOPY WITH RETROGRADE PYELOGRAM;  Surgeon: Adrain Phalen, MD;  Location[facility];  Service: Urology    HERNIA REPAIR      NEPHRECTOMY Left     and left ureter    PROSTATECTOMY      ROTATOR CUFF REPAIR Left     SHOULDER SURGERY      dislocation     History reviewed  No pertinent family history  reports that he has never smoked   He has never used smokeless tobacco  He reports that he drinks alcohol  He reports that he does not use drugs  Physical Exam:   Vitals:    02/06/18 1220   BP: 132/78   BP Location: Left arm   Patient Position: Sitting   Cuff Size: Standard   Pulse: 78   Weight: 76 1 kg (167 lb 12 8 oz)   Height: 5' 10" (1 778 m)     Body mass index is 24 08 kg/m²      General: cooperative, in not acute distress  Eyes: conjunctivae pink, anicteric sclerae  ENT: lips and mucous membranes moist  Neck: supple, no JVD  Chest: clear breath sounds bilateral, no crackles, ronchus or wheezings  CVS: distinct S1 & S2, normal rate, regular rhythm  Abdomen: soft, non-tender, non-distended, normoactive bowel sounds  Extremities: no edema of both legs  Skin: no rash  Neuro: awake, alert, oriented      Lab Results:  Results for orders placed or performed during the hospital encounter of 16/87/04   Basic metabolic panel   Result Value Ref Range    Sodium 138 136 - 145 mmol/L    Potassium 4 7 3 5 - 5 3 mmol/L    Chloride 105 100 - 108 mmol/L    CO2 26 21 - 32 mmol/L    Anion Gap 7 4 - 13 mmol/L    BUN 18 5 - 25 mg/dL    Creatinine 1 24 0 60 - 1 30 mg/dL    Glucose 101 65 - 140 mg/dL    Calcium 8 5 8 3 - 10 1 mg/dL    eGFR 57 ml/min/1 73sq m   CBC and differential   Result Value Ref Range    WBC 23 00 (H) 4 31 - 10 16 Thousand/uL    RBC 2 53 (L) 3 88 - 5 62 Million/uL    Hemoglobin 8 6 (L) 12 0 - 17 0 g/dL    Hematocrit 26 2 (L) 36 5 - 49 3 %     (H) 82 - 98 fL    MCH 34 0 26 8 - 34 3 pg    MCHC 32 8 31 4 - 37 4 g/dL    RDW 20 8 (H) 11 6 - 15 1 %    MPV 11 0 8 9 - 12 7 fL    Platelets 41 (LL) 800 - 390 Thousands/uL    nRBC 0 /100 WBCs   CBC and differential   Result Value Ref Range    WBC 22 29 (H) 4 31 - 10 16 Thousand/uL    RBC 2 60 (L) 3 88 - 5 62 Million/uL    Hemoglobin 8 6 (L) 12 0 - 17 0 g/dL    Hematocrit 26 7 (L) 36 5 - 49 3 %     (H) 82 - 98 fL    MCH 33 1 26 8 - 34 3 pg    MCHC 32 2 31 4 - 37 4 g/dL    RDW 20 8 (H) 11 6 - 15 1 %    MPV 10 8 8 9 - 12 7 fL    Platelets 36 (LL) 149 - 390 Thousands/uL    nRBC 0 /100 WBCs   Comprehensive metabolic panel   Result Value Ref Range    Sodium 136 136 - 145 mmol/L    Potassium 4 3 3 5 - 5 3 mmol/L    Chloride 102 100 - 108 mmol/L    CO2 27 21 - 32 mmol/L    Anion Gap 7 4 - 13 mmol/L    BUN 21 5 - 25 mg/dL    Creatinine 1 25 0 60 - 1 30 mg/dL    Glucose 110 65 - 140 mg/dL    Calcium 8 6 8 3 - 10 1 mg/dL    AST 52 (H) 5 - 45 U/L    ALT 73 12 - 78 U/L    Alkaline Phosphatase 145 (H) 46 - 116 U/L    Total Protein 7 1 6 4 - 8 2 g/dL    Albumin 3 3 (L) 3 5 - 5 0 g/dL    Total Bilirubin 0 55 0 20 - 1 00 mg/dL    eGFR 56 ml/min/1 73sq m   Protime-INR   Result Value Ref Range    Protime 14 1 12 1 - 14 4 seconds    INR 1 09 0 86 - 1 16   CBC and differential   Result Value Ref Range    WBC 18 42 (H) 4 31 - 10 16 Thousand/uL    RBC 2 55 (L) 3 88 - 5 62 Million/uL    Hemoglobin 8 6 (L) 12 0 - 17 0 g/dL    Hematocrit 26 0 (L) 36 5 - 49 3 %     (H) 82 - 98 fL    MCH 33 7 26 8 - 34 3 pg    MCHC 33 1 31 4 - 37 4 g/dL    RDW 20 6 (H) 11 6 - 15 1 %    MPV 10 8 8 9 - 12 7 fL    Platelets 25 (LL) 380 - 390 Thousands/uL    nRBC 0 /100 WBCs   Comprehensive metabolic panel   Result Value Ref Range    Sodium 136 136 - 145 mmol/L    Potassium 4 2 3 5 - 5 3 mmol/L    Chloride 102 100 - 108 mmol/L    CO2 24 21 - 32 mmol/L    Anion Gap 10 4 - 13 mmol/L    BUN 19 5 - 25 mg/dL    Creatinine 1 31 (H) 0 60 - 1 30 mg/dL    Glucose 110 65 - 140 mg/dL    Calcium 8 7 8 3 - 10 1 mg/dL    AST 47 (H) 5 - 45 U/L    ALT 74 12 - 78 U/L    Alkaline Phosphatase 134 (H) 46 - 116 U/L    Total Protein 7 0 6 4 - 8 2 g/dL    Albumin 3 2 (L) 3 5 - 5 0 g/dL    Total Bilirubin 0 51 0 20 - 1 00 mg/dL    eGFR 53 ml/min/1 73sq m   Basic metabolic panel   Result Value Ref Range    Sodium 137 136 - 145 mmol/L    Potassium 4 6 3 5 - 5 3 mmol/L    Chloride 103 100 - 108 mmol/L    CO2 25 21 - 32 mmol/L    Anion Gap 9 4 - 13 mmol/L    BUN 19 5 - 25 mg/dL    Creatinine 1 22 0 60 - 1 30 mg/dL Glucose 108 65 - 140 mg/dL    Calcium 8 9 8 3 - 10 1 mg/dL    eGFR 58 ml/min/1 73sq m   CBC and differential   Result Value Ref Range    WBC 25 40 (H) 4 31 - 10 16 Thousand/uL    RBC 2 49 (L) 3 88 - 5 62 Million/uL    Hemoglobin 8 2 (L) 12 0 - 17 0 g/dL    Hematocrit 25 1 (L) 36 5 - 49 3 %     (H) 82 - 98 fL    MCH 32 9 26 8 - 34 3 pg    MCHC 32 7 31 4 - 37 4 g/dL    RDW 20 3 (H) 11 6 - 15 1 %    MPV 11 5 8 9 - 12 7 fL    Platelets 24 (LL) 971 - 390 Thousands/uL    nRBC 0 /100 WBCs   Basic metabolic panel   Result Value Ref Range    Sodium 137 136 - 145 mmol/L    Potassium 4 3 3 5 - 5 3 mmol/L    Chloride 102 100 - 108 mmol/L    CO2 26 21 - 32 mmol/L    Anion Gap 9 4 - 13 mmol/L    BUN 18 5 - 25 mg/dL    Creatinine 1 32 (H) 0 60 - 1 30 mg/dL    Glucose 113 65 - 140 mg/dL    Calcium 9 3 8 3 - 10 1 mg/dL    eGFR 53 ml/min/1 73sq m   CBC   Result Value Ref Range    WBC 20 77 (H) 4 31 - 10 16 Thousand/uL    RBC 2 38 (L) 3 88 - 5 62 Million/uL    Hemoglobin 8 0 (L) 12 0 - 17 0 g/dL    Hematocrit 24 1 (L) 36 5 - 49 3 %     (H) 82 - 98 fL    MCH 33 6 26 8 - 34 3 pg    MCHC 33 2 31 4 - 37 4 g/dL    RDW 20 5 (H) 11 6 - 15 1 %    Platelets 84 (L) 184 - 390 Thousands/uL    MPV 10 4 8 9 - 12 7 fL   Prepare platelet pheresis:Transfusion Indications: Prophylactic in stable, non-bleeding outpatient with platelet count < or = 20,000, Platelet Inhibiting drugs or platelet dysfunction; Special Requirements: CMV/Leukoreduced, Irradiated; Has consen       Result Value Ref Range    Unit Product Code E9375O06     Unit Number [id number]     Unit ABO O     Unit [facility] POS     Unit Dispense Status Presumed Trans     Unit Product Code Z5146P32     Unit Number [id number]     Unit ABO B     Unit RH POS     Unit Dispense Status Presumed Trans    Manual Differential(PHLEBS Do Not Order)   Result Value Ref Range    Segmented % 93 (H) 43 - 75 %    Bands % 2 0 - 8 %    Lymphocytes % 2 (L) 14 - 44 %    Monocytes % 1 (L) 4 - 12 % Eosinophils % 2 0 - 6 %    Basophils % 0 0 - 1 %    Absolute Neutrophils 21 85 (H) 1 85 - 7 62 Thousand/uL    Lymphocytes Absolute 0 46 (L) 0 60 - 4 47 Thousand/uL    Monocytes Absolute 0 23 0 00 - 1 22 Thousand/uL    Eosinophils Absolute 0 46 (H) 0 00 - 0 40 Thousand/uL    Basophils Absolute 0 00 0 00 - 0 10 Thousand/uL    Total Counted      Smudge Cells Present     RBC Morphology Present     Anisocytosis Present     Macrocytes Present     Platelet Estimate Decreased (A) Adequate   Manual Differential(PHLEBS Do Not Order)   Result Value Ref Range    Segmented % 94 (H) 43 - 75 %    Lymphocytes % 1 (L) 14 - 44 %    Monocytes % 5 4 - 12 %    Eosinophils % 0 0 - 6 %    Basophils % 0 0 - 1 %    Absolute Neutrophils 20 95 (H) 1 85 - 7 62 Thousand/uL    Lymphocytes Absolute 0 22 (L) 0 60 - 4 47 Thousand/uL    Monocytes Absolute 1 11 0 00 - 1 22 Thousand/uL    Eosinophils Absolute 0 00 0 00 - 0 40 Thousand/uL    Basophils Absolute 0 00 0 00 - 0 10 Thousand/uL    Total Counted      RBC Morphology Present     Anisocytosis Present     Macrocytes Present     Platelet Estimate Decreased (A) Adequate   Manual Differential(PHLEBS Do Not Order)   Result Value Ref Range    Segmented % 88 (H) 43 - 75 %    Lymphocytes % 2 (L) 14 - 44 %    Monocytes % 6 4 - 12 %    Eosinophils % 1 0 - 6 %    Basophils % 0 0 - 1 %    Metamyelocytes% 2 (H) 0 - 1 %    Myelocytes % 1 0 - 1 %    Absolute Neutrophils 16 21 (H) 1 85 - 7 62 Thousand/uL    Lymphocytes Absolute 0 37 (L) 0 60 - 4 47 Thousand/uL    Monocytes Absolute 1 11 0 00 - 1 22 Thousand/uL    Eosinophils Absolute 0 18 0 00 - 0 40 Thousand/uL    Basophils Absolute 0 00 0 00 - 0 10 Thousand/uL    Total Counted      RBC Morphology Present     Anisocytosis Present     Macrocytes Present     Poikilocytes Present     Platelet Estimate Decreased (A) Adequate   Manual Differential(PHLEBS Do Not Order)   Result Value Ref Range    Segmented % 87 (H) 43 - 75 %    Bands % 2 0 - 8 %    Lymphocytes % 3 (L) 14 - 44 %    Monocytes % 3 (L) 4 - 12 %    Eosinophils % 0 0 - 6 %    Basophils % 0 0 - 1 %    Metamyelocytes% 3 (H) 0 - 1 %    Atypical Lymphocytes % 2 (H) <=0 %    Absolute Neutrophils 22 61 (H) 1 85 - 7 62 Thousand/uL    Lymphocytes Absolute 0 76 0 60 - 4 47 Thousand/uL    Monocytes Absolute 0 76 0 00 - 1 22 Thousand/uL    Eosinophils Absolute 0 00 0 00 - 0 40 Thousand/uL    Basophils Absolute 0 00 0 00 - 0 10 Thousand/uL    Total Counted      Toxic Granulation Present     RBC Morphology Present     Anisocytosis Present     Polychromasia Present     Platelet Estimate Decreased (A) Adequate               Portions of the record may have been created with voice recognition software  Occasional wrong word or "sound a like" substitutions may have occurred due to the inherent limitations of voice recognition software  Read the chart carefully and recognize, using context, where substitutions have occurred  If you have any questions, please contact the dictating provider

## 2018-02-06 NOTE — PATIENT INSTRUCTIONS
All questions asked and answered  The patient has been instructed to call office at [phone] with any questions or concerns    The patient is in agreement  -Will see in 6 months for renal management with updated blood work  -Eduction material given for [employer]" and "Monitoring kidney Health"

## 2018-02-08 ENCOUNTER — HOSPITAL ENCOUNTER (OUTPATIENT)
Dept: NON INVASIVE DIAGNOSTICS | Facility: CLINIC | Age: 73
Discharge: HOME | End: 2018-02-08
Payer: MEDICARE

## 2018-02-08 DIAGNOSIS — I25.10 CAD (CORONARY ARTERY DISEASE): ICD-10-CM

## 2018-02-08 PROCEDURE — 93306 TTE W/DOPPLER COMPLETE: CPT | Performed by: INTERNAL MEDICINE

## 2018-02-08 PROCEDURE — 93306 TTE W/DOPPLER COMPLETE: CPT

## 2018-02-13 NOTE — MISCELLANEOUS
Message   Recorded as Task   Date: 11/06/2017 09:36 AM, Created By: Rashida Godinez   Task Name: Call Back   Assigned To: Mark GEE,TEAM   Regarding Patient: Christine Fair, Status: Active   CommentJeridanielakim Free - 06 Nov 2017 9:36 AM     TASK CREATED  Other  Patients wife Maria Isabel Garcia called asking to speak with a nurse regarding a cysto and his treatments please call her back [phone]   Marva Burris - 06 Nov 2017 10:53 AM     TASK EDITED  SPOKE TO WIFE, STATES DR Phuc Carballo 15 OR NEXT  APPT 11/9 AT 8:30 WITH DR BLANCHARD  Active Problems    1  Adenocarcinoma of kidney (189 0) (C64 9)   2  Benign essential HTN (401 1) (I10)   3  Benign essential tremor (333 1) (G25 0)   4  CAD (coronary artery disease) (414 00) (I25 10)   5  Chronic stable angina (413 9) (I20 8)   6  Gross hematuria (599 71) (R31 0)   7  Hyperlipidemia (272 4) (E78 5)   8  Incontinence (788 30) (R32)   9  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 (188 2) (C67 2)   10  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)   11  RBBB (right bundle branch block) (426 4) (I45 10)   12  Sleep disturbance (780 50) (G47 9)    Current Meds   1  AmLODIPine Besylate 2 5 MG Oral Tablet; TAKE 1 TABLET DAILY; Therapy: (Recorded:[id number]) to Recorded   2  Aspirin 81 MG TABS; Take 1 tablet daily; Therapy: (Recorded:[id number]) to Recorded   3  Atorvastatin Calcium 20 MG Oral Tablet; TAKE 1 TABLET DAILY; Therapy: [id number] to Recorded   4  CARBOplatin 150 MG/15ML Intravenous Solution; AS DIRECTED   LISTED AS 8   /CARBOPLATOIN AUC5 D1 EVERY 21;   Therapy: [id number] to Recorded   5  Gemzar 1 GM Intravenous Solution Reconstituted (Gemcitabine HCl); USE AS   DIRECTED; Therapy: [id number] to Recorded   6  Metoprolol Tartrate 25 MG Oral Tablet; TAKE 1 TABLET TWICE DAILY; Therapy: (Recorded:[id number]) to Recorded   7  Multi-Vitamins TABS; TAKE 1 TABLET DAILY;    Therapy: (Recorded:15Pgn1145) to Recorded   8  Ranexa 500 MG Oral Tablet Extended Release 12 Hour; Take 1 tablet twice daily; Therapy: (Recorded:[id number]) to Recorded   9  Vitamin B-12 TABS; TAKE 1 TABLET DAILY; Therapy: (Recorded:[id number]) to Recorded   10  Vitamin D3 2000 UNIT Oral Capsule; take 1 capsule daily; Therapy: (Recorded:[id number]) to Recorded   11  Zofran 4 MG Oral Tablet (Ondansetron HCl); Therapy: (Recorded:23Gyq9067) to Recorded    Allergies    1   Levaquin TABS    Signatures   Electronically signed by : Ramona Fragoso RN; Nov 6 2017 10:54AM EST                       (Author)

## 2018-02-20 ENCOUNTER — OFFICE VISIT (OUTPATIENT)
Dept: CARDIOLOGY CLINIC | Facility: CLINIC | Age: 73
End: 2018-02-20
Payer: MEDICARE

## 2018-02-20 VITALS
SYSTOLIC BLOOD PRESSURE: 124 MMHG | DIASTOLIC BLOOD PRESSURE: 62 MMHG | HEART RATE: 56 BPM | WEIGHT: 166 LBS | BODY MASS INDEX: 23.77 KG/M2 | RESPIRATION RATE: 16 BRPM | HEIGHT: 70 IN

## 2018-02-20 DIAGNOSIS — I10 ESSENTIAL HYPERTENSION: ICD-10-CM

## 2018-02-20 DIAGNOSIS — I34.0 NON-RHEUMATIC MITRAL REGURGITATION: ICD-10-CM

## 2018-02-20 DIAGNOSIS — I25.118 CORONARY ARTERY DISEASE OF NATIVE ARTERY OF NATIVE HEART WITH STABLE ANGINA PECTORIS (HCC): Chronic | ICD-10-CM

## 2018-02-20 DIAGNOSIS — E78.5 HYPERLIPIDEMIA, UNSPECIFIED HYPERLIPIDEMIA TYPE: Chronic | ICD-10-CM

## 2018-02-20 DIAGNOSIS — I20.8 CHRONIC STABLE ANGINA (HCC): Primary | Chronic | ICD-10-CM

## 2018-02-20 DIAGNOSIS — I45.10 RBBB: Chronic | ICD-10-CM

## 2018-02-20 PROCEDURE — 99214 OFFICE O/P EST MOD 30 MIN: CPT | Performed by: INTERNAL MEDICINE

## 2018-02-20 RX ORDER — RANOLAZINE 500 MG/1
500 TABLET, EXTENDED RELEASE ORAL 2 TIMES DAILY
Refills: 0
Start: 2018-02-20 | End: 2018-07-20 | Stop reason: SDUPTHER

## 2018-02-20 RX ORDER — ATORVASTATIN CALCIUM 20 MG/1
20 TABLET, FILM COATED ORAL EVERY EVENING
Qty: 90 TABLET | Refills: 3 | Status: SHIPPED | OUTPATIENT
Start: 2018-02-20 | End: 2018-09-19 | Stop reason: ALTCHOICE

## 2018-02-20 RX ORDER — ISOSORBIDE MONONITRATE 60 MG/1
60 TABLET, EXTENDED RELEASE ORAL DAILY
Qty: 60 TABLET | Refills: 0
Start: 2018-02-20 | End: 2018-07-20 | Stop reason: SDUPTHER

## 2018-02-20 RX ORDER — METOPROLOL TARTRATE 50 MG/1
50 TABLET, FILM COATED ORAL EVERY 12 HOURS SCHEDULED
Qty: 180 TABLET | Refills: 3 | Status: SHIPPED | OUTPATIENT
Start: 2018-02-20 | End: 2019-01-25 | Stop reason: SDUPTHER

## 2018-02-20 NOTE — PROGRESS NOTES
Cardiology Follow Up    Ge Pool  1945  [id number]  [address] [place] [address] [address]  Isreal Bowie   [phone]    Reason for visit:Follow-up for chronic angina, CAD status post remote coronary artery bypass grafting with recent catheterization last month showing severe native circumflex disease not amenable to intervention, hypertension, hyperlipidemia, right bundle branch block and moderate mitral regurgitation    1  Chronic stable angina ([facility] )     2  Coronary artery disease of native artery of native heart with stable angina pectoris ([facility] )     3  Essential hypertension     4  Hyperlipidemia, unspecified hyperlipidemia type     5  RBBB     6  Non-rheumatic mitral regurgitation         Interval History:    since the patient's last visit, he was admitted to the hospital last month with accelerating chest pain and had a nontransmural myocardial infarction  Echocardiogram showed a preserved ejection fraction with moderate mitral regurgitation  There was no right ventricular  Dilation is appreciated previously  Cardiac catheterization showed patent grafts to the LAD and right coronary  Systems with severe native disease of the circumflex which had been seen before  He was fairly anemic  He has not had chemotherapy for a while  His last hemoglobin was 9 8  He denies any chest pain or shortness of breath at this time  He denies edema, palpitations or lightheadedness      Patient Active Problem List   Diagnosis    Leukocytosis    Chronic anemia    Chronic stable angina (HCC)    CAD (coronary artery disease)    Hyperlipidemia    RBBB    Transitional cell bladder cancer ([facility] )    NSTEMI (non-ST elevated myocardial infarction) ([facility] )    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    Essential hypertension    Chronic kidney disease (CKD) stage G3a/A1, moderately decreased glomerular filtration rate (GFR) between 45-59 mL/min/1 73 square meter and albuminuria creatinine ratio less than 30 mg/g    Stress incontinence    Solitary kidney, acquired    Non-rheumatic mitral regurgitation     Past Medical History:   Diagnosis Date    Anesthesia     "can't urinate after surgery"    Basal cell carcinoma     Coronary artery disease     Essential tremor     of head and hands bilat    Hearing aid worn     bilat    History of kidney stones     History of pneumonia     childhood    History of prostate cancer     Hx of radiation therapy     2007 for after prostate surgery    Hyperlipidemia     Hypertension     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 ([address] )     and" left kidney and left ureter removed"    Urinary incontinence     wears Depends    Wears glasses     Wears partial dentures     lower     Social History     Social History    Marital status: /Civil Union     Spouse name: N/A    Number of children: N/A    Years of education: N/A     Occupational History    Not on file  Social History Main Topics    Smoking status: Never Smoker    Smokeless tobacco: Never Used    Alcohol use Yes      Comment: RARELY    Drug use: No    Sexual activity: Not on file     Other Topics Concern    Not on file     Social History Narrative    No narrative on file      No family history on file    Past Surgical History:   Procedure Laterality Date    APPENDECTOMY      BACK SURGERY      lumbar herniated disc repair    CARDIAC CATHETERIZATION      CORONARY ARTERY BYPASS GRAFT      x2 in 2004   [address] [place] [address] [address] N/A 7/3/2017    Procedure: BLADDER BIOPSY;  Surgeon: Chelsi Stoll MD;  Location: [facility];  Service: Urology    CYSTOSCOPY W/ RETROGRADES Right 7/3/2017    Procedure: CYSTOSCOPY WITH RETROGRADE PYELOGRAM;  Surgeon: Chelsi Stoll MD;  Location[facility];  Service: Urology    HERNIA REPAIR      NEPHRECTOMY Left     and left ureter    PROSTATECTOMY      ROTATOR CUFF REPAIR Left     SHOULDER SURGERY dislocation       Current Outpatient Prescriptions:     aspirin 81 mg chewable tablet, Chew 81 mg daily, Disp: , Rfl:     atorvastatin (LIPITOR) 40 mg tablet, Take 1 tablet by mouth every evening, Disp: 30 tablet, Rfl: 0    Cholecalciferol (D3-1000) 1000 units capsule, Take 1,000 Units by mouth daily, Disp: , Rfl:     Cyanocobalamin (B-12) 1000 MCG CAPS, Take by mouth daily, Disp: , Rfl:     furosemide (LASIX) 20 mg tablet, Take 1 tablet (20 mg total) by mouth every other day, Disp: 30 tablet, Rfl: 0    isosorbide mononitrate (IMDUR) 60 mg 24 hr tablet, Take 1 tablet (60 mg total) by mouth 2 (two) times a day, Disp: 60 tablet, Rfl: 0    LORazepam (ATIVAN) 0 5 mg tablet, Take by mouth every 8 (eight) hours as needed for anxiety, Disp: , Rfl:     metoprolol tartrate (LOPRESSOR) 50 mg tablet, Take 1 tablet (50 mg total) by mouth every 12 (twelve) hours, Disp: 60 tablet, Rfl: 0    Multiple Vitamin (MULTIVITAMIN) tablet, Take 1 tablet by mouth daily, Disp: , Rfl:     Nitroglycerin (NITROTAB SL), Place under the tongue every 5 (five) minutes as needed "never used", Disp: , Rfl:     ranolazine (RANEXA) 1000 MG SR tablet, Take 1 tablet by mouth 2 (two) times a day, Disp: 60 tablet, Rfl: 0    amLODIPine (NORVASC) 2 5 mg tablet, Take 2 5 mg by mouth, Disp: , Rfl:     CARBOplatin (PARAPLATIN) 150 mg/15 mL, Infuse into a venous catheter, Disp: , Rfl:     cefdinir (OMNICEF) 300 mg capsule, take 1 capsule by mouth twice a day for 5 days, Disp: , Rfl: 0    econazole nitrate 1 % cream, , Disp: , Rfl: 0    gemcitabine (GEMZAR) 1 g, Infuse into a venous catheter, Disp: , Rfl:     ondansetron (ZOFRAN) 8 mg tablet, Take by mouth every 8 (eight) hours as needed for nausea or vomiting, Disp: , Rfl:     prochlorperazine (COMPAZINE) 10 mg tablet, Take 10 mg by mouth every 6 (six) hours as needed for nausea or vomiting, Disp: , Rfl:     prochlorperazine (COMPAZINE) 10 mg tablet, Take 10 mg by mouth every 6 (six) hours, Disp: , Rfl:   Allergies   Allergen Reactions    Levaquin [Levofloxacin] Rash    Percocet [Oxycodone-Acetaminophen] GI Intolerance     Constipation,,,happens with narcotics like percocet    Lovastatin Other (See Comments)     myopathy       Review of Systems:  Review of Systems   Constitutional: Positive for fatigue  Negative for chills, fever and unexpected weight change  Respiratory: Negative for cough, chest tightness, shortness of breath and wheezing  Cardiovascular: Negative for chest pain, palpitations and leg swelling  Gastrointestinal: Negative for abdominal pain, blood in stool, constipation, diarrhea and nausea  Genitourinary: Positive for frequency  Negative for flank pain, hematuria and testicular pain  Musculoskeletal: Negative for arthralgias, back pain, gait problem and joint swelling  Psychiatric/Behavioral: Negative for agitation, behavioral problems, confusion and decreased concentration  Physical Exam:  Vitals:    02/20/18 0940   BP: 124/62   BP Location: Left arm   Patient Position: Sitting   Cuff Size: Standard   Pulse: 56   Resp: 16   Weight: 75 3 kg (166 lb)   Height: 5' 10" (1 778 m)     Physical Exam   Constitutional: He is oriented to person, place, and time  He appears well-developed and well-nourished  No distress  HENT:   Head: Normocephalic and atraumatic  Mouth/Throat: No oropharyngeal exudate  Eyes: No scleral icterus  Pale conjunctiva   Neck: Neck supple  Normal carotid pulses and no JVD present  Carotid bruit is not present  No thyromegaly present  Cardiovascular: Normal rate and regular rhythm  Exam reveals decreased pulses  Exam reveals no gallop and no friction rub  Murmur (grade 1 systolic murmur at base  no diastolic murmur  grade 1-2 apical systolic murmur) heard  Pulses:       Dorsalis pedis pulses are 0 on the right side, and 0 on the left side  Posterior tibial pulses are 1+ on the right side, and 1+ on the left side  Pulmonary/Chest: He has decreased breath sounds in the right upper field, the right middle field, the right lower field, the left upper field, the left middle field and the left lower field  He has no wheezes  He has no rhonchi  He has no rales  Abdominal: There is no hepatosplenomegaly  There is no tenderness  There is no guarding  Musculoskeletal: He exhibits deformity (kyphosis)  He exhibits no edema  Neurological: He is alert and oriented to person, place, and time  He has normal strength  No cranial nerve deficit or sensory deficit  Skin: Skin is warm and dry  No rash noted  No erythema  No pallor  Psychiatric: He has a normal mood and affect  His behavior is normal  Judgment and thought content normal           Discussion/Summary:  1  Chronic stable angina  Patient having no chest pain at this time albeit with limited activities  Anemia appears to play a major role in activating angina in the distribution of the circumflex vessel  Continue nitrates, metoprolol and ranolazine  2  CAD  Findings as noted above  No plans for intervention  Medical treatment  Continue aspirin and statin therapy  3   Hypertension  Well controlled on beta-blockers  Continue same  4  Hyperlipidemia  LDL cholesterol is 44 on presentation  No need to continue higher dose of atorvastatin  Resume 20 mg daily   5  Right bundle branch block  No evidence for bradycardia arrhythmias  6  Mitral regurgitation  Moderate on echocardiogram   Has not progressed for some time   7  Question congestive heart failure on presentation  May have been related to ischemia  Have reduce furosemide to 20 mg twice weekly and intend to wean him off completely in the next 2-4 weeks      FU 4 months    Omer Mehta MD

## 2018-03-26 ENCOUNTER — TELEPHONE (OUTPATIENT)
Dept: CARDIOLOGY CLINIC | Facility: CLINIC | Age: 73
End: 2018-03-26

## 2018-03-26 NOTE — TELEPHONE ENCOUNTER
Phone call from patient with update from last ov 2/20/18  He has reduced atorvastatin to 20mg daily  He has weaned himself off of furosemide as directed by Dr Kirill Mercado  He has not taken any furosemide for approx one week now  He has no complaints of SOB or swelling  He is feeling good  He has return ov June/2018 with Dr Kirill Mercado

## 2018-05-07 ENCOUNTER — PROCEDURE VISIT (OUTPATIENT)
Dept: UROLOGY | Facility: MEDICAL CENTER | Age: 73
End: 2018-05-07
Payer: MEDICARE

## 2018-05-07 DIAGNOSIS — N39.3 STRESS INCONTINENCE, MALE: ICD-10-CM

## 2018-05-07 DIAGNOSIS — D09.0 CARCINOMA IN SITU OF BLADDER: Primary | ICD-10-CM

## 2018-05-07 PROBLEM — D09.9 INTRAEPITHELIAL CARCINOMA: Status: ACTIVE | Noted: 2018-05-07

## 2018-05-07 LAB
SL AMB  POCT GLUCOSE, UA: ABNORMAL
SL AMB LEUKOCYTE ESTERASE,UA: ABNORMAL
SL AMB POCT BILIRUBIN,UA: ABNORMAL
SL AMB POCT BLOOD,UA: ABNORMAL
SL AMB POCT CLARITY,UA: CLEAR
SL AMB POCT COLOR,UA: YELLOW
SL AMB POCT KETONES,UA: ABNORMAL
SL AMB POCT NITRITE,UA: ABNORMAL
SL AMB POCT PH,UA: 5.5
SL AMB POCT SPECIFIC GRAVITY,UA: 1.03
SL AMB POCT URINE PROTEIN: ABNORMAL
SL AMB POCT UROBILINOGEN: 0.2

## 2018-05-07 PROCEDURE — 52000 CYSTOURETHROSCOPY: CPT | Performed by: UROLOGY

## 2018-05-07 PROCEDURE — 81003 URINALYSIS AUTO W/O SCOPE: CPT | Performed by: UROLOGY

## 2018-05-07 PROCEDURE — 88112 CYTOPATH CELL ENHANCE TECH: CPT | Performed by: PATHOLOGY

## 2018-05-07 PROCEDURE — 99214 OFFICE O/P EST MOD 30 MIN: CPT | Performed by: UROLOGY

## 2018-05-07 NOTE — PROGRESS NOTES
Assessment/Plan:  1  Urine cytology, cysto appearance is no different than  past three examinations  2   Cysto in three months, CT scans per Dr Vineet Cruz  No problem-specific [address] [place] [address] notes found for this encounter  Diagnoses and all orders for this visit:    Carcinoma in situ of bladder  -     POCT urine dip auto non-scope  -     Cytology, urine; Future    Stress incontinence, male    Other orders  -     Cystoscopy          Subjective:      Patient ID: Bernard Rosales is a 74 y o  male  Follow-up for CIS of bladder  See prior note regarding left nephro ureterectomy, several bladder tumor resections  Last chemotherapy was three months ago with Dr Vineet Cruz of [facility]  Past of and stress incontinence is no different  , no hematuria burning or other bladder symptoms  The following portions of the patient's history were reviewed and updated as appropriate: allergies, current medications, past family history, past medical history, past social history, past surgical history and problem list     Review of Systems   All other systems reviewed and are negative  Objective: There were no vitals taken for this visit  Physical Exam   Constitutional: He is oriented to person, place, and time  He appears well-developed and well-nourished  No distress  HENT:   Head: Normocephalic and atraumatic  Eyes: Conjunctivae are normal    Cardiovascular: Normal rate and regular rhythm  Pulmonary/Chest: Effort normal and breath sounds normal  No respiratory distress  He has no wheezes  Abdominal: Soft  Bowel sounds are normal  He exhibits no distension and no mass  There is no tenderness  Genitourinary: Testes normal and penis normal    Neurological: He is alert and oriented to person, place, and time  Skin: Skin is warm and dry  He is not diaphoretic         Cystoscopy  Date/Time: 5/7/2018 10:10 AM  Performed by: Rosi Parker  Authorized by: Rosi Parker     Procedure details: cystoscopy    Additional Procedure Details: The patient was carefully  positioned supine on the examining table  Sterile preparation was performed on the urethra  Xylocaine jelly was instilled and left  Indwelling for the procedure  The 13 Marshallese flexible cystoscope was passed with the following findings:      Urethra:  No strictures    Prostate:  Absent    Bladder:  Empties well, mild erythema right posterior wall slight sloughing mucosa  No nodular tumor or any bleeding     Residual urine:  50 mL    Patient tolerated the procedure well and was escorted from the examining table

## 2018-05-07 NOTE — LETTER
May 7, 2018     MD Rupesh Mcfarland   [address]  [facility]    Patient: Deejay Lees   YOB: 1945   Date of Visit: 5/7/2018       Dear Dr Jose Manuel Kruger: Thank you for referring Isatu Rob to me for evaluation  Below are my notes for this consultation  If you have questions, please do not hesitate to call me  I look forward to following your patient along with you  Sincerely,        Kaylee Cruz MD        CC: MD Kaylee Finn Res, MD  5/7/2018 10:11 AM  Sign at close encounter  Assessment/Plan:  1  Urine cytology, cysto appearance is no different than  past three examinations  2   Cysto in three months, CT scans per Dr Edward Julian  No problem-specific [address] [place] [address] notes found for this encounter  Diagnoses and all orders for this visit:    Carcinoma in situ of bladder  -     POCT urine dip auto non-scope  -     Cytology, urine; Future    Stress incontinence, male    Other orders  -     Cystoscopy          Subjective:      Patient ID: Deejay Lees is a 74 y o  male  Follow-up for CIS of bladder  See prior note regarding left nephro ureterectomy, several bladder tumor resections  Last chemotherapy was three months ago with Dr Edward Julian of [facility]  Past of and stress incontinence is no different  , no hematuria burning or other bladder symptoms  The following portions of the patient's history were reviewed and updated as appropriate: allergies, current medications, past family history, past medical history, past social history, past surgical history and problem list     Review of Systems   All other systems reviewed and are negative  Objective: There were no vitals taken for this visit  Physical Exam   Constitutional: He is oriented to person, place, and time  He appears well-developed and well-nourished  No distress  HENT:   Head: Normocephalic and atraumatic     Eyes: Conjunctivae are normal    Cardiovascular: Normal rate and regular rhythm  Pulmonary/Chest: Effort normal and breath sounds normal  No respiratory distress  He has no wheezes  Abdominal: Soft  Bowel sounds are normal  He exhibits no distension and no mass  There is no tenderness  Genitourinary: Testes normal and penis normal    Neurological: He is alert and oriented to person, place, and time  Skin: Skin is warm and dry  He is not diaphoretic  Cystoscopy  Date/Time: 5/7/2018 10:10 AM  Performed by: Tiburcio Gillis  Authorized by: Tiburcio Gillis     Procedure details: cystoscopy    Additional Procedure Details: The patient was carefully  positioned supine on the examining table  Sterile preparation was performed on the urethra  Xylocaine jelly was instilled and left  Indwelling for the procedure  The 13 [place]ian flexible cystoscope was passed with the following findings:      Urethra:  No strictures    Prostate:  Absent    Bladder:  Empties well, mild erythema right posterior wall slight sloughing mucosa  No nodular tumor or any bleeding     Residual urine:  50 mL    Patient tolerated the procedure well and was escorted from the examining table

## 2018-05-07 NOTE — PROGRESS NOTES
KEFLEX 500MG WAS GIVEN TO PT  PRIOR TO CYSTO PROCEDURE    LOT# [id number]  EXP: 03/2019   NDC# 7525-6370-86

## 2018-05-16 ENCOUNTER — TELEPHONE (OUTPATIENT)
Dept: UROLOGY | Facility: AMBULATORY SURGERY CENTER | Age: 73
End: 2018-05-16

## 2018-05-16 NOTE — TELEPHONE ENCOUNTER
Spoke with patient, he wants to know the results of his urine sample  Please advise and call him back   Thank you

## 2018-05-17 ENCOUNTER — TELEPHONE (OUTPATIENT)
Dept: UROLOGY | Facility: MEDICAL CENTER | Age: 73
End: 2018-05-17

## 2018-05-17 NOTE — TELEPHONE ENCOUNTER
----- Message from Jordi Desouza MD sent at 5/16/2018  2:31 PM EDT -----  I told pt results  call pt - [place] BCG [place] [address] [facility]; then decide on maintenance  Do I put orders in Epic?

## 2018-05-21 ENCOUNTER — TELEPHONE (OUTPATIENT)
Dept: UROLOGY | Facility: MEDICAL CENTER | Age: 73
End: 2018-05-21

## 2018-05-21 DIAGNOSIS — Z00.00 HEALTHCARE MAINTENANCE: Primary | ICD-10-CM

## 2018-05-21 NOTE — TELEPHONE ENCOUNTER
Spoke to wife pt to start 8 weekly BCG treatments 5/29 at [facility]  First culture due 5/22  Second culture due 6/20 after 4th treatment  Will may schedule to pt

## 2018-05-22 ENCOUNTER — APPOINTMENT (OUTPATIENT)
Dept: LAB | Facility: MEDICAL CENTER | Age: 73
End: 2018-05-22
Attending: UROLOGY
Payer: MEDICARE

## 2018-05-22 ENCOUNTER — TRANSCRIBE ORDERS (OUTPATIENT)
Dept: ADMINISTRATIVE | Facility: HOSPITAL | Age: 73
End: 2018-05-22

## 2018-05-22 DIAGNOSIS — Z00.00 HEALTHCARE MAINTENANCE: ICD-10-CM

## 2018-05-22 PROCEDURE — 87077 CULTURE AEROBIC IDENTIFY: CPT

## 2018-05-22 PROCEDURE — 87186 SC STD MICRODIL/AGAR DIL: CPT

## 2018-05-22 PROCEDURE — 87086 URINE CULTURE/COLONY COUNT: CPT

## 2018-05-24 ENCOUNTER — TELEPHONE (OUTPATIENT)
Dept: UROLOGY | Facility: MEDICAL CENTER | Age: 73
End: 2018-05-24

## 2018-05-24 DIAGNOSIS — N30.90 CYSTITIS: Primary | ICD-10-CM

## 2018-05-24 DIAGNOSIS — N30.00 ACUTE CYSTITIS WITHOUT HEMATURIA: Primary | ICD-10-CM

## 2018-05-24 LAB — BACTERIA UR CULT: ABNORMAL

## 2018-05-24 RX ORDER — SULFAMETHOXAZOLE AND TRIMETHOPRIM 800; 160 MG/1; MG/1
1 TABLET ORAL EVERY 12 HOURS SCHEDULED
Qty: 14 TABLET | Refills: 0 | Status: SHIPPED | OUTPATIENT
Start: 2018-05-24 | End: 2018-05-31

## 2018-05-24 NOTE — TELEPHONE ENCOUNTER
----- Message from Darryl Murphy MD sent at 5/24/2018 11:06 AM EDT -----  Tell patient I sent Bactrim to his pharmacy because of positive urine culture  One tablet twice per day seven days    He is scheduled to start BCG sometime soon, he should have a repeat urine culture 2-4 days after finishing the Bactrim to make sure his urine is cleared before he starts BCG

## 2018-05-24 NOTE — TELEPHONE ENCOUNTER
Spoke to wife  Notified of positive culture  Will repeat culture 6/4 after finishing course of Bactrim  First 2 BCG treatments cancelled at [facility]

## 2018-05-24 NOTE — TELEPHONE ENCOUNTER
I called [facility] in [address]  Left message on machine to cancel 5/29 and 6/5 BCG Instillations

## 2018-05-25 NOTE — TELEPHONE ENCOUNTER
Called infusion center at [phone] #2  to make sure BCG appts for 5/29 and 6/5 are cancelled  They are cancelled

## 2018-05-29 ENCOUNTER — HOSPITAL ENCOUNTER (OUTPATIENT)
Dept: INFUSION CENTER | Facility: CLINIC | Age: 73
Discharge: HOME | End: 2018-05-29

## 2018-06-04 ENCOUNTER — APPOINTMENT (OUTPATIENT)
Dept: LAB | Facility: MEDICAL CENTER | Age: 73
End: 2018-06-04
Attending: UROLOGY
Payer: MEDICARE

## 2018-06-04 DIAGNOSIS — N30.90 CYSTITIS: ICD-10-CM

## 2018-06-04 PROCEDURE — 87086 URINE CULTURE/COLONY COUNT: CPT

## 2018-06-05 LAB — BACTERIA UR CULT: NORMAL

## 2018-06-08 ENCOUNTER — TELEPHONE (OUTPATIENT)
Dept: UROLOGY | Facility: MEDICAL CENTER | Age: 73
End: 2018-06-08

## 2018-06-08 NOTE — TELEPHONE ENCOUNTER
Spoke to pt, had mixed contaminants in sample  Pt asymptomatic  Will proceed with BCG next week  Will need to add 2 more Instillations at the end due to cancelling the first two initially due to UTI

## 2018-06-12 ENCOUNTER — HOSPITAL ENCOUNTER (OUTPATIENT)
Dept: INFUSION CENTER | Facility: CLINIC | Age: 73
Discharge: HOME | End: 2018-06-12
Payer: MEDICARE

## 2018-06-12 VITALS
DIASTOLIC BLOOD PRESSURE: 66 MMHG | RESPIRATION RATE: 18 BRPM | SYSTOLIC BLOOD PRESSURE: 122 MMHG | TEMPERATURE: 97.8 F | HEART RATE: 57 BPM

## 2018-06-12 PROCEDURE — 51720 TREATMENT OF BLADDER LESION: CPT

## 2018-06-12 RX ADMIN — SODIUM CHLORIDE 50 MG: 9 INJECTION, SOLUTION INTRAVENOUS at 10:46

## 2018-06-12 NOTE — PROGRESS NOTES
Patient arrived to the unit for BCG  Patient is incontinent and will leave the12 Kiswahili catheter in for one hour of turning and rotating  [place] was unclamped and removed  Patient was given barrier cream to apply to his peritoneal area to protect his skin in the brief  He was advised to notify the MD if he develops a rash

## 2018-06-20 ENCOUNTER — TELEPHONE (OUTPATIENT)
Dept: UROLOGY | Facility: MEDICAL CENTER | Age: 73
End: 2018-06-20

## 2018-06-20 ENCOUNTER — HOSPITAL ENCOUNTER (OUTPATIENT)
Dept: INFUSION CENTER | Facility: CLINIC | Age: 73
Discharge: HOME | End: 2018-06-20

## 2018-06-20 VITALS
TEMPERATURE: 97.5 F | RESPIRATION RATE: 18 BRPM | HEART RATE: 62 BPM | SYSTOLIC BLOOD PRESSURE: 130 MMHG | DIASTOLIC BLOOD PRESSURE: 70 MMHG

## 2018-06-20 NOTE — PROGRESS NOTES
Pt arrived to unit without complaint,pt here for BCG bladder chemotherapy  2 attempts made by 2  RNs to pass 14 fr  hsieh catheter, both attempts unsuccessful  No 12fr hsieh catheters available at infusion center today  Dr Sunita Alvarez made aware of events via his nurse Darus Pencil  Pt will return in one week  Additional appt added to pt's schedule to make up for today's missed dose  Pt made aware and verbalized understanding

## 2018-06-20 NOTE — TELEPHONE ENCOUNTER
Spoke to Nevaeh from [facility]  Used 12 fr catheter last week for first instillation  This week only had 14 fr unable to insert  Will have 12 fr available next week  Informed her if difficulty inserting will have pt resume treatments in office

## 2018-06-21 ENCOUNTER — TELEPHONE (OUTPATIENT)
Dept: UROLOGY | Facility: AMBULATORY SURGERY CENTER | Age: 73
End: 2018-06-21

## 2018-06-21 NOTE — TELEPHONE ENCOUNTER
PATIENTS WIFE CALLED AND WOULD LIKE TO SPEAK TO DR BLANCHARD'S NURSE  SHE HAS A QUESTION ABOUT ANUM'S INFUSIONS   PLEASE ADVISE  [phone]

## 2018-06-21 NOTE — TELEPHONE ENCOUNTER
Pt has eye surgery sched for 8/1, may need to adjust last few BCG Instillations  We will see how pt does with treatments the month of July

## 2018-06-26 ENCOUNTER — HOSPITAL ENCOUNTER (OUTPATIENT)
Dept: INFUSION CENTER | Facility: CLINIC | Age: 73
Discharge: HOME | End: 2018-06-26
Payer: MEDICARE

## 2018-06-26 VITALS
RESPIRATION RATE: 18 BRPM | TEMPERATURE: 97.6 F | DIASTOLIC BLOOD PRESSURE: 80 MMHG | SYSTOLIC BLOOD PRESSURE: 153 MMHG | HEART RATE: 57 BPM

## 2018-06-26 RX ADMIN — SODIUM CHLORIDE 50 MG: 9 INJECTION, SOLUTION INTRAVENOUS at 09:49

## 2018-06-26 NOTE — PROGRESS NOTES
Patient arrived on unit for bladder chemotherapy  A 12 Fr hsieh inserted without difficulty  BCG instilled as ordered  Hsieh clamped  Patient turned every 15 minutes as ordered for first hour and tolerated  Hsieh drained and then hsieh D/C without any issues  Patient washed perineal area with ointments as directed  Patient Denies any pain/discomforts  AVS given to patient /wife  Aware of next appointment

## 2018-07-03 ENCOUNTER — HOSPITAL ENCOUNTER (OUTPATIENT)
Dept: INFUSION CENTER | Facility: CLINIC | Age: 73
Discharge: HOME | End: 2018-07-03
Payer: MEDICARE

## 2018-07-03 VITALS
RESPIRATION RATE: 16 BRPM | HEART RATE: 62 BPM | SYSTOLIC BLOOD PRESSURE: 132 MMHG | TEMPERATURE: 97.8 F | DIASTOLIC BLOOD PRESSURE: 70 MMHG

## 2018-07-03 PROCEDURE — 51720 TREATMENT OF BLADDER LESION: CPT

## 2018-07-03 RX ORDER — CEPHALEXIN 500 MG/1
500 CAPSULE ORAL EVERY 8 HOURS SCHEDULED
COMMUNITY
End: 2018-07-20 | Stop reason: ALTCHOICE

## 2018-07-03 RX ADMIN — SODIUM CHLORIDE 50 MG: 9 INJECTION, SOLUTION INTRAMUSCULAR; INTRAVENOUS; SUBCUTANEOUS at 10:10

## 2018-07-03 NOTE — PLAN OF CARE

## 2018-07-03 NOTE — PROGRESS NOTES
Patient tolerated the insertion of a 12 Yakut hsieh catheter  The second attempt was successful  BCg was inserted and the hsieh was clamped  After one hour the BCG was released and the catheter was removed  Patient left ambulatory and is aware of his next appointment

## 2018-07-10 ENCOUNTER — TELEPHONE (OUTPATIENT)
Dept: UROLOGY | Facility: MEDICAL CENTER | Age: 73
End: 2018-07-10

## 2018-07-10 ENCOUNTER — HOSPITAL ENCOUNTER (OUTPATIENT)
Dept: INFUSION CENTER | Facility: CLINIC | Age: 73
Discharge: HOME | End: 2018-07-10
Payer: MEDICARE

## 2018-07-10 VITALS
TEMPERATURE: 97.9 F | SYSTOLIC BLOOD PRESSURE: 112 MMHG | RESPIRATION RATE: 16 BRPM | DIASTOLIC BLOOD PRESSURE: 62 MMHG | HEART RATE: 72 BPM

## 2018-07-10 DIAGNOSIS — N30.90 CYSTITIS: Primary | ICD-10-CM

## 2018-07-10 RX ADMIN — SODIUM CHLORIDE 50 MG: 9 INJECTION, SOLUTION INTRAVENOUS at 10:06

## 2018-07-10 NOTE — TELEPHONE ENCOUNTER
Pt completed 4th treatment  Will go for culture 7/12 at [facility]  Slip entered in system

## 2018-07-10 NOTE — PROGRESS NOTES
Patient arrived on unit for bladder chemotherapy  Denies any discomforts  12 Fr hsieh inserted without difficulty  BCG instilled  Patient turned every 15 minutes as per orders and tolerated  Perineal care done and hsieh D/C as per order  Patient and wife aware of next appointment   AVS given to wife

## 2018-07-12 ENCOUNTER — APPOINTMENT (OUTPATIENT)
Dept: LAB | Facility: MEDICAL CENTER | Age: 73
End: 2018-07-12
Attending: UROLOGY
Payer: MEDICARE

## 2018-07-12 DIAGNOSIS — N30.90 CYSTITIS: ICD-10-CM

## 2018-07-12 PROCEDURE — 87086 URINE CULTURE/COLONY COUNT: CPT

## 2018-07-13 LAB — BACTERIA UR CULT: NORMAL

## 2018-07-17 ENCOUNTER — HOSPITAL ENCOUNTER (OUTPATIENT)
Dept: INFUSION CENTER | Facility: CLINIC | Age: 73
Discharge: HOME | End: 2018-07-17
Payer: MEDICARE

## 2018-07-17 VITALS
TEMPERATURE: 96.8 F | DIASTOLIC BLOOD PRESSURE: 73 MMHG | SYSTOLIC BLOOD PRESSURE: 133 MMHG | HEART RATE: 57 BPM | RESPIRATION RATE: 16 BRPM

## 2018-07-17 RX ADMIN — SODIUM CHLORIDE 50 MG: 9 INJECTION, SOLUTION INTRAMUSCULAR; INTRAVENOUS; SUBCUTANEOUS at 10:01

## 2018-07-20 ENCOUNTER — OFFICE VISIT (OUTPATIENT)
Dept: CARDIOLOGY CLINIC | Facility: CLINIC | Age: 73
End: 2018-07-20
Payer: MEDICARE

## 2018-07-20 VITALS
BODY MASS INDEX: 25.34 KG/M2 | SYSTOLIC BLOOD PRESSURE: 130 MMHG | WEIGHT: 177 LBS | DIASTOLIC BLOOD PRESSURE: 76 MMHG | HEIGHT: 70 IN | HEART RATE: 64 BPM

## 2018-07-20 DIAGNOSIS — I34.0 NON-RHEUMATIC MITRAL REGURGITATION: ICD-10-CM

## 2018-07-20 DIAGNOSIS — I45.10 RBBB: Chronic | ICD-10-CM

## 2018-07-20 DIAGNOSIS — I10 ESSENTIAL HYPERTENSION: ICD-10-CM

## 2018-07-20 DIAGNOSIS — I25.118 CORONARY ARTERY DISEASE OF NATIVE ARTERY OF NATIVE HEART WITH STABLE ANGINA PECTORIS (HCC): Primary | Chronic | ICD-10-CM

## 2018-07-20 DIAGNOSIS — E78.2 MIXED HYPERLIPIDEMIA: Chronic | ICD-10-CM

## 2018-07-20 PROCEDURE — 93000 ELECTROCARDIOGRAM COMPLETE: CPT | Performed by: INTERNAL MEDICINE

## 2018-07-20 PROCEDURE — 99214 OFFICE O/P EST MOD 30 MIN: CPT | Performed by: INTERNAL MEDICINE

## 2018-07-20 RX ORDER — ISOSORBIDE MONONITRATE 30 MG/1
30 TABLET, EXTENDED RELEASE ORAL DAILY
Qty: 90 TABLET | Refills: 3 | Status: SHIPPED | OUTPATIENT
Start: 2018-07-20 | End: 2019-01-25 | Stop reason: SDUPTHER

## 2018-07-20 RX ORDER — RANOLAZINE 500 MG/1
500 TABLET, EXTENDED RELEASE ORAL 2 TIMES DAILY
Qty: 180 TABLET | Refills: 3 | Status: SHIPPED | OUTPATIENT
Start: 2018-07-20 | End: 2019-01-25 | Stop reason: SDUPTHER

## 2018-07-20 NOTE — PROGRESS NOTES
Cardiology Follow Up    Case Pugh  1945  [id number]  [address]  [address] [id number] [phone]      Reason for visit: 4 month FU for chronic angina, CAD s/p remote coronary artery bypass grafting with recent catheterization earlier this year showing severe native circumflex disease not amenable to intervention, hypertension, hyperlipidemia, right bundle branch block and moderate mitral regurgitation    1  Coronary artery disease of native artery of native heart with stable angina pectoris (HCC)  POCT ECG   2  Essential hypertension  POCT ECG   3  Non-rheumatic mitral regurgitation  POCT ECG   4  Mixed hyperlipidemia  POCT ECG   5  RBBB         Interval History: The patients last Hb in May was >13  He is having no AP  He is walking on the treadmill w/o angina  He is no longer having chemorx  He denies SOB, edema, palpitations or dizziness       Patient Active Problem List   Diagnosis    Leukocytosis    Chronic anemia    Chronic stable angina (HCC)    CAD (coronary artery disease)    Hyperlipidemia    RBBB    Transitional cell bladder cancer [facility]    [facility]non[facility]    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    Essential hypertension    Chronic kidney disease (CKD) stage G3a/A1, moderately decreased glomerular filtration rate (GFR) between 45-59 mL/min/1 73 square meter and albuminuria creatinine ratio less than 30 mg/g    Stress incontinence, male    Solitary kidney, acquired    Non-rheumatic mitral regurgitation    Intraepithelial carcinoma    Acute cystitis without hematuria     Past Medical History:   Diagnosis Date    Anesthesia     "can't urinate after surgery"    Basal cell carcinoma     Coronary artery disease     Essential tremor     of head and hands bilat    Hearing aid worn     bilat    History of kidney stones     History of pneumonia childhood    History of prostate cancer     Hx of radiation therapy     2007 for after prostate surgery    Hyperlipidemia     Hypertension     Transitional cell bladder cancer (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)     and" left kidney and left ureter removed"    Urinary incontinence     wears Depends    Wears glasses     Wears partial dentures     lower     Social History     Social History    Marital status: /Civil Union     Spouse name: N/A    Number of children: N/A    Years of education: N/A     Occupational History    Not on file  Social History Main Topics    Smoking status: Never Smoker    Smokeless tobacco: Never Used    Alcohol use Yes      Comment: RARELY    Drug use: No    Sexual activity: Not on file     Other Topics Concern    Not on file     Social History Narrative    No narrative on file      No family history on file    Past Surgical History:   Procedure Laterality Date    APPENDECTOMY      BACK SURGERY      lumbar herniated disc repair    CARDIAC CATHETERIZATION      CORONARY ARTERY BYPASS GRAFT      x2 in 2004   [address] N/A 7/3/2017    Procedure: BLADDER BIOPSY;  Surgeon: Caridad Romberg, MD;  Location: [facility];  Service: Urology    CYSTOSCOPY W/ RETROGRADES Right 7/3/2017    Procedure: CYSTOSCOPY WITH RETROGRADE PYELOGRAM;  Surgeon: Caridad Romberg, MD;  Location[facility];  Service: Urology    HERNIA REPAIR      groin    NEPHRECTOMY Left     and left ureter    PROSTATECTOMY      ROTATOR CUFF REPAIR Left     SHOULDER SURGERY      dislocation    SKIN CANCER EXCISION  2011    left arm and right eyebrow       Current Outpatient Prescriptions:     aspirin 81 mg chewable tablet, Chew 81 mg daily, Disp: , Rfl:     atorvastatin (LIPITOR) 20 mg tablet, Take 1 tablet (20 mg total) by mouth every evening, Disp: 90 tablet, Rfl: 3    Cholecalciferol (D3-1000) 1000 units capsule, Take 1,000 Units by mouth daily, Disp: , Rfl:     Cyanocobalamin (B-12) 1000 MCG CAPS, Take by mouth daily, Disp: , Rfl:     furosemide (LASIX) 20 mg tablet, Take 1 tablet (20 mg total) by mouth every other day (Patient taking differently: Take 20 mg by mouth daily as needed  ), Disp: 30 tablet, Rfl: 0    isosorbide mononitrate (IMDUR) 60 mg 24 hr tablet, Take 1 tablet (60 mg total) by mouth daily, Disp: 60 tablet, Rfl: 0    metoprolol tartrate (LOPRESSOR) 50 mg tablet, Take 1 tablet (50 mg total) by mouth every 12 (twelve) hours, Disp: 180 tablet, Rfl: 3    Multiple Vitamin (MULTIVITAMIN) tablet, Take 1 tablet by mouth daily, Disp: , Rfl:     Nitroglycerin (NITROTAB SL), Place under the tongue every 5 (five) minutes as needed "never used", Disp: , Rfl:     ranolazine (RANEXA) 500 mg 12 hr tablet, Take 1 tablet (500 mg total) by mouth 2 (two) times a day (Patient taking differently: Take 1,000 mg by mouth 2 (two) times a day  ), Disp: , Rfl: 0    cephalexin (KEFLEX) 500 mg capsule, Take 500 mg by mouth every 8 (eight) hours Until capsules are finished, Disp: , Rfl:     LORazepam (ATIVAN) 0 5 mg tablet, Take by mouth every 8 (eight) hours as needed for anxiety, Disp: , Rfl:     ondansetron (ZOFRAN) 8 mg tablet, Take by mouth every 8 (eight) hours as needed for nausea or vomiting, Disp: , Rfl:     prochlorperazine (COMPAZINE) 10 mg tablet, Take 10 mg by mouth every 6 (six) hours, Disp: , Rfl:   Allergies   Allergen Reactions    Levaquin [Levofloxacin] Rash    Percocet [Oxycodone-Acetaminophen] GI Intolerance     Constipation,,,happens with narcotics like percocet    Lovastatin Other (See Comments)     myopathy       Review of Systems:  Review of Systems   Constitutional: Positive for fatigue  Negative for chills, fever and unexpected weight change  Respiratory: Negative for cough, chest tightness, shortness of breath and wheezing  Cardiovascular: Negative for chest pain, palpitations and leg swelling     Gastrointestinal: Negative for abdominal pain, blood in stool, constipation, diarrhea and nausea  Genitourinary: Positive for frequency  Negative for flank pain, hematuria and testicular pain  Musculoskeletal: Negative for arthralgias, back pain, gait problem and joint swelling  Neurological: Negative for dizziness, light-headedness, numbness and headaches  Psychiatric/Behavioral: Negative for agitation, behavioral problems, confusion and decreased concentration  Physical Exam:  Vitals:    07/20/18 0958   BP: 130/76   Pulse: 64   Weight: 80 3 kg (177 lb)   Height: 5' 10" (1 778 m)       Physical Exam   Constitutional: He is oriented to person, place, and time  He appears well-developed and well-nourished  No distress  HENT:   Head: Normocephalic and atraumatic  Mouth/Throat: No oropharyngeal exudate  Eyes: Conjunctivae are normal  No scleral icterus  Neck: Neck supple  Normal carotid pulses and no JVD present  Carotid bruit is not present  No thyromegaly present  Cardiovascular: Normal rate and regular rhythm  Exam reveals decreased pulses  Exam reveals no gallop and no friction rub  Murmur (grade 1 systolic murmur at base  no diastolic murmur  grade 1-2 apical systolic murmur) heard  Pulses:       Dorsalis pedis pulses are 0 on the right side, and 0 on the left side  Posterior tibial pulses are 1+ on the right side, and 1+ on the left side  Pulmonary/Chest: He has decreased breath sounds in the right upper field, the right middle field, the right lower field, the left upper field, the left middle field and the left lower field  He has no wheezes  He has no rhonchi  He has no rales  Abdominal: There is no hepatosplenomegaly  There is no tenderness  There is no guarding  Musculoskeletal: He exhibits deformity (kyphosis)  He exhibits no edema  Neurological: He is alert and oriented to person, place, and time  He has normal strength  No cranial nerve deficit or sensory deficit     Skin: Skin is warm and dry  No rash noted  No erythema  No pallor  Psychiatric: He has a normal mood and affect  His behavior is normal  Judgment and thought content normal           Discussion/Summary:  1  CAD with stable angina  Pretty much angina free at this time since hemoglobin is 13  Does have severe circumflex disease not amenable to intervention  Continue ranolazine  Inadvertently he had his dosage increase but was doing well on the 500 mg b i d  dosage which we will continue  Continue metoprolol at 50 mg twice daily  Will reduce isosorbide to 30 mg of the mononitrate formulation daily  Continue aspirin going forward  2   Essential hypertension  Blood pressure excellent on metoprolol and nitrates  3  Moderate mitral regurgitation  Not audible on exam   Does not appear to be problematic going forward  4  Mixed hyperlipidemia  Patient on atorvastatin  Has had excellent LDL cholesterol levels on this dosage  Will check LDL cholesterol with upcoming blood work  5  Right bundle branch block/left anterior hemiblock  No evidence for bradycardia arrhythmias    Patient clear to have basal cell carcinoma removed under local with sedation    Okay to stop aspirin 1 week prior to surgery      Follow-up 6 months    Eliu Enamorado MD

## 2018-07-24 ENCOUNTER — HOSPITAL ENCOUNTER (OUTPATIENT)
Dept: INFUSION CENTER | Facility: CLINIC | Age: 73
Discharge: HOME | End: 2018-07-24
Payer: MEDICARE

## 2018-07-24 RX ADMIN — SODIUM CHLORIDE 50 MG: 9 INJECTION, SOLUTION INTRAMUSCULAR; INTRAVENOUS; SUBCUTANEOUS at 09:36

## 2018-07-24 NOTE — PROGRESS NOTES
Patient tolerated the insertion of a 12 Turkish hsieh catheter  He denies complications from his previous infusions  Patient tolerated the instillation of BCG and held his urine for one hour  Patient left ambulatory

## 2018-07-24 NOTE — PLAN OF CARE

## 2018-07-31 ENCOUNTER — TELEPHONE (OUTPATIENT)
Dept: UROLOGY | Facility: MEDICAL CENTER | Age: 73
End: 2018-07-31

## 2018-07-31 ENCOUNTER — HOSPITAL ENCOUNTER (OUTPATIENT)
Dept: INFUSION CENTER | Facility: CLINIC | Age: 73
Discharge: HOME | End: 2018-07-31
Payer: MEDICARE

## 2018-07-31 VITALS
RESPIRATION RATE: 16 BRPM | SYSTOLIC BLOOD PRESSURE: 119 MMHG | HEART RATE: 58 BPM | DIASTOLIC BLOOD PRESSURE: 70 MMHG | TEMPERATURE: 98.1 F

## 2018-07-31 PROCEDURE — 51720 TREATMENT OF BLADDER LESION: CPT

## 2018-07-31 RX ADMIN — SODIUM CHLORIDE 50 MG: 9 INJECTION, SOLUTION INTRAMUSCULAR; INTRAVENOUS; SUBCUTANEOUS at 09:31

## 2018-07-31 NOTE — TELEPHONE ENCOUNTER
Patient's wife called to speak to the Clinical Coordinator  She said she spoke with her last week, and was told to call her back this week to get patient scheduled, but she wasn't sure for what type appointment  She can be reached at [phone]

## 2018-07-31 NOTE — PROGRESS NOTES
Patient arrived on unit for bladder chemotherapy  Denies any discomforts  12 Fr hsieh inserted without difficulty  BCG instilled  Patient turned every 15 minutes as per orders and tolerated  Perineal care done and hsieh D/C as per order  Patient and wife aware of next appointment   AVS declined

## 2018-08-06 ENCOUNTER — APPOINTMENT (OUTPATIENT)
Dept: LAB | Facility: MEDICAL CENTER | Age: 73
End: 2018-08-06
Payer: MEDICARE

## 2018-08-06 DIAGNOSIS — D64.9 CHRONIC ANEMIA: Chronic | ICD-10-CM

## 2018-08-06 DIAGNOSIS — N18.31 CHRONIC KIDNEY DISEASE (CKD) STAGE G3A/A1, MODERATELY DECREASED GLOMERULAR FILTRATION RATE (GFR) BETWEEN 45-59 ML/MIN/1.73 SQUARE METER AND ALBUMINURIA CREATININE RATIO LESS THAN 30 MG/G (HCC): ICD-10-CM

## 2018-08-06 LAB
ANION GAP SERPL CALCULATED.3IONS-SCNC: 6 MMOL/L (ref 4–13)
BUN SERPL-MCNC: 24 MG/DL (ref 5–25)
CALCIUM SERPL-MCNC: 9.2 MG/DL (ref 8.3–10.1)
CHLORIDE SERPL-SCNC: 103 MMOL/L (ref 100–108)
CO2 SERPL-SCNC: 28 MMOL/L (ref 21–32)
CREAT SERPL-MCNC: 1.33 MG/DL (ref 0.6–1.3)
ERYTHROCYTE [DISTWIDTH] IN BLOOD BY AUTOMATED COUNT: 15.4 % (ref 11.6–15.1)
FERRITIN SERPL-MCNC: 172 NG/ML (ref 8–388)
GFR SERPL CREATININE-BSD FRML MDRD: 52 ML/MIN/1.73SQ M
GLUCOSE P FAST SERPL-MCNC: 126 MG/DL (ref 65–99)
HCT VFR BLD AUTO: 43.5 % (ref 36.5–49.3)
HGB BLD-MCNC: 13.8 G/DL (ref 12–17)
IRON SATN MFR SERPL: 29 %
IRON SERPL-MCNC: 86 UG/DL (ref 65–175)
LDLC SERPL DIRECT ASSAY-MCNC: 87 MG/DL (ref 0–100)
MAGNESIUM SERPL-MCNC: 2.2 MG/DL (ref 1.6–2.6)
MCH RBC QN AUTO: 31.9 PG (ref 26.8–34.3)
MCHC RBC AUTO-ENTMCNC: 31.7 G/DL (ref 31.4–37.4)
MCV RBC AUTO: 101 FL (ref 82–98)
PHOSPHATE SERPL-MCNC: 3.2 MG/DL (ref 2.3–4.1)
PLATELET # BLD AUTO: 148 THOUSANDS/UL (ref 149–390)
PMV BLD AUTO: 10.7 FL (ref 8.9–12.7)
POTASSIUM SERPL-SCNC: 4.5 MMOL/L (ref 3.5–5.3)
RBC # BLD AUTO: 4.33 MILLION/UL (ref 3.88–5.62)
SODIUM SERPL-SCNC: 137 MMOL/L (ref 136–145)
TIBC SERPL-MCNC: 296 UG/DL (ref 250–450)
WBC # BLD AUTO: 5.49 THOUSAND/UL (ref 4.31–10.16)

## 2018-08-06 PROCEDURE — 83550 IRON BINDING TEST: CPT

## 2018-08-06 PROCEDURE — 83735 ASSAY OF MAGNESIUM: CPT

## 2018-08-06 PROCEDURE — 83540 ASSAY OF IRON: CPT

## 2018-08-06 PROCEDURE — 85027 COMPLETE CBC AUTOMATED: CPT

## 2018-08-06 PROCEDURE — 84100 ASSAY OF PHOSPHORUS: CPT

## 2018-08-06 PROCEDURE — 82728 ASSAY OF FERRITIN: CPT

## 2018-08-06 PROCEDURE — 83721 ASSAY OF BLOOD LIPOPROTEIN: CPT | Performed by: INTERNAL MEDICINE

## 2018-08-06 PROCEDURE — 80048 BASIC METABOLIC PNL TOTAL CA: CPT

## 2018-08-06 PROCEDURE — 36415 COLL VENOUS BLD VENIPUNCTURE: CPT

## 2018-08-07 ENCOUNTER — HOSPITAL ENCOUNTER (OUTPATIENT)
Dept: INFUSION CENTER | Facility: CLINIC | Age: 73
Discharge: HOME | End: 2018-08-07
Payer: MEDICARE

## 2018-08-07 VITALS
DIASTOLIC BLOOD PRESSURE: 68 MMHG | RESPIRATION RATE: 16 BRPM | SYSTOLIC BLOOD PRESSURE: 118 MMHG | TEMPERATURE: 98.1 F | HEART RATE: 60 BPM

## 2018-08-07 PROCEDURE — 51720 TREATMENT OF BLADDER LESION: CPT

## 2018-08-07 RX ADMIN — SODIUM CHLORIDE 50 MG: 9 INJECTION, SOLUTION INTRAVENOUS at 09:58

## 2018-08-07 NOTE — PROGRESS NOTES
Patient tolerated the insertion of a 12 Mauritanian hsieh catheter placed by Kori Montgomery RN  BCG was instilled and he was able to hold his urine for one hour while turning and rotating  Hsieh was drained and removed  Patient is aware of his follow up appointments with Dr Buffy Arteaga

## 2018-08-20 DIAGNOSIS — I20.8 STABLE ANGINA (HCC): Primary | ICD-10-CM

## 2018-08-20 RX ORDER — NITROGLYCERIN 0.4 MG/1
0.4 TABLET SUBLINGUAL
Qty: 25 TABLET | Refills: 5 | Status: SHIPPED | OUTPATIENT
Start: 2018-08-20 | End: 2020-02-25 | Stop reason: SDUPTHER

## 2018-08-27 ENCOUNTER — TRANSCRIBE ORDERS (OUTPATIENT)
Dept: ADMINISTRATIVE | Facility: HOSPITAL | Age: 73
End: 2018-08-27

## 2018-08-27 ENCOUNTER — APPOINTMENT (OUTPATIENT)
Dept: LAB | Facility: MEDICAL CENTER | Age: 73
End: 2018-08-27
Attending: UROLOGY
Payer: MEDICARE

## 2018-08-27 DIAGNOSIS — N30.90 DIVERTICULITIS OF BLADDER: ICD-10-CM

## 2018-08-27 DIAGNOSIS — N30.90 DIVERTICULITIS OF BLADDER: Primary | ICD-10-CM

## 2018-08-27 LAB
BILIRUB UR QL STRIP: NEGATIVE
CLARITY UR: CLEAR
COLOR UR: YELLOW
CREAT UR-MCNC: 92.1 MG/DL
GLUCOSE UR STRIP-MCNC: NEGATIVE MG/DL
HGB UR QL STRIP.AUTO: NEGATIVE
KETONES UR STRIP-MCNC: NEGATIVE MG/DL
LEUKOCYTE ESTERASE UR QL STRIP: NEGATIVE
NITRITE UR QL STRIP: NEGATIVE
PH UR STRIP.AUTO: 5.5 [PH] (ref 4.5–8)
PROT UR STRIP-MCNC: NEGATIVE MG/DL
PROT UR-MCNC: 12 MG/DL
PROT/CREAT UR: 0.13 MG/G{CREAT} (ref 0–0.1)
SP GR UR STRIP.AUTO: 1.02 (ref 1–1.03)
UROBILINOGEN UR QL STRIP.AUTO: 0.2 E.U./DL

## 2018-08-27 PROCEDURE — 87086 URINE CULTURE/COLONY COUNT: CPT

## 2018-08-27 PROCEDURE — 81003 URINALYSIS AUTO W/O SCOPE: CPT | Performed by: NURSE PRACTITIONER

## 2018-08-27 PROCEDURE — 84156 ASSAY OF PROTEIN URINE: CPT | Performed by: NURSE PRACTITIONER

## 2018-08-27 PROCEDURE — 82570 ASSAY OF URINE CREATININE: CPT | Performed by: NURSE PRACTITIONER

## 2018-08-27 PROCEDURE — 87186 SC STD MICRODIL/AGAR DIL: CPT

## 2018-08-27 PROCEDURE — 87077 CULTURE AEROBIC IDENTIFY: CPT

## 2018-08-28 ENCOUNTER — OFFICE VISIT (OUTPATIENT)
Dept: NEPHROLOGY | Facility: CLINIC | Age: 73
End: 2018-08-28
Payer: MEDICARE

## 2018-08-28 VITALS
HEART RATE: 62 BPM | BODY MASS INDEX: 24.88 KG/M2 | WEIGHT: 173.8 LBS | SYSTOLIC BLOOD PRESSURE: 138 MMHG | HEIGHT: 70 IN | DIASTOLIC BLOOD PRESSURE: 70 MMHG

## 2018-08-28 DIAGNOSIS — Z90.5 SOLITARY KIDNEY, ACQUIRED: ICD-10-CM

## 2018-08-28 DIAGNOSIS — N18.30 CKD (CHRONIC KIDNEY DISEASE) STAGE 3, GFR 30-59 ML/MIN (HCC): Primary | ICD-10-CM

## 2018-08-28 PROCEDURE — 99213 OFFICE O/P EST LOW 20 MIN: CPT | Performed by: INTERNAL MEDICINE

## 2018-08-28 NOTE — LETTER
August 28, 2018     MD Rupesh Godoy   [address]  [facility]    Patient: Wojciech Granados   YOB: 1945   Date of Visit: 8/28/2018     Dear Dr Patti Lafleur      Thank you for referring Federico Johnson to me for evaluation  Below are the relevant portions of my assessment and plan of care  If you have questions, please do not hesitate to call me  I look forward to following Jefferson Both along with you  Sincerely,        Aj Enter, DO        CC: Zoë Salamanca MD    Progress Notes:    ASSESSMENT and PLAN:  1  Chronic kidney disease, baseline creatinine 1 3-1 4, current GFR 52  2  Solitary kidney function with history of nephrectomy  3  History of coronary disease, volume status appears stable  4  History of transitional cell carcinoma of the renal pelvis, status post chemotherapy with carboplatin and gemcitabine    · Overall renal function remains stable  · Volume status and blood pressure stable  · Given stability of renal function post surgery, post chemotherapy as well as recent hospitalization, will plan to seem approximately 1 year with repeat labs at that time

## 2018-08-28 NOTE — PROGRESS NOTES
NEPHROLOGY OUTPATIENT PROGRESS NOTE   Waqas Monge 74 y o  male MRN: [id number]  Reason for visit:   Chronic kidney disease    ASSESSMENT and PLAN:  1  Chronic kidney disease, baseline creatinine 1 3-1 4, current GFR 52  2  Solitary kidney function with history of nephrectomy  3  History of coronary disease, volume status appears stable  4  History of transitional cell carcinoma of the renal pelvis, status post chemotherapy with carboplatin and gemcitabine,    · Overall renal function remains stable  · Volume status and blood pressure stable  · Given stability of renal function post surgery, post chemotherapy as well as recent hospitalization, will plan to seem approximately 1 year with repeat labs at that time  SUBJECTIVE / INTERVAL HISTORY:  He has been doing reasonably well  He recently finished bladder infusions for CIS of the bladder  Chemotherapy finished in January  He has been taking Lasix only as needed recently  Denies any chest pain shortness of breath or swelling  No appetite changes  Review of Systems      OBJECTIVE:  /70 (BP Location: Left arm, Patient Position: Sitting, Cuff Size: Standard)   Pulse 62   Ht 5' 10" (1 778 m)   Wt 78 8 kg (173 lb 12 8 oz)   BMI 24 94 kg/m²     Physical Exam   Constitutional: He is oriented to person, place, and time  No distress  HENT:   Head: Normocephalic  Eyes: No scleral icterus  Neck: Neck supple  Cardiovascular: Normal rate and regular rhythm  Pulmonary/Chest: Effort normal and breath sounds normal    Abdominal: Soft  He exhibits no distension  Musculoskeletal: He exhibits no edema  Neurological: He is alert and oriented to person, place, and time  Skin: Skin is warm and dry  Psychiatric: He has a normal mood and affect           Medications:    Current Outpatient Prescriptions:     aspirin 81 mg chewable tablet, Chew 81 mg daily, Disp: , Rfl:     atorvastatin (LIPITOR) 20 mg tablet, Take 1 tablet (20 mg total) by mouth every evening, Disp: 90 tablet, Rfl: 3    Cholecalciferol (D3-1000) 1000 units capsule, Take 1,000 Units by mouth daily, Disp: , Rfl:     Cyanocobalamin (B-12) 1000 MCG CAPS, Take by mouth daily, Disp: , Rfl:     furosemide (LASIX) 20 mg tablet, Take 1 tablet (20 mg total) by mouth every other day (Patient taking differently: Take 20 mg by mouth daily as needed  ), Disp: 30 tablet, Rfl: 0    isosorbide mononitrate (IMDUR) 30 mg 24 hr tablet, Take 1 tablet (30 mg total) by mouth daily, Disp: 90 tablet, Rfl: 3    metoprolol tartrate (LOPRESSOR) 50 mg tablet, Take 1 tablet (50 mg total) by mouth every 12 (twelve) hours, Disp: 180 tablet, Rfl: 3    Multiple Vitamin (MULTIVITAMIN) tablet, Take 1 tablet by mouth daily, Disp: , Rfl:     nitroglycerin (NITROSTAT) 0 4 mg SL tablet, Place 1 tablet (0 4 mg total) under the tongue every 5 (five) minutes as needed for chest pain, Disp: 25 tablet, Rfl: 5    Nitroglycerin (NITROTAB SL), Place under the tongue every 5 (five) minutes as needed "never used", Disp: , Rfl:     ranolazine (RANEXA) 500 mg 12 hr tablet, Take 1 tablet (500 mg total) by mouth 2 (two) times a day, Disp: 180 tablet, Rfl: 3    Laboratory Results:  Results for orders placed or performed in visit on 08/06/18   CBC and Platelet   Result Value Ref Range    WBC 5 49 4 31 - 10 16 Thousand/uL    RBC 4 33 3 88 - 5 62 Million/uL    Hemoglobin 13 8 12 0 - 17 0 g/dL    Hematocrit 43 5 36 5 - 49 3 %     (H) 82 - 98 fL    MCH 31 9 26 8 - 34 3 pg    MCHC 31 7 31 4 - 37 4 g/dL    RDW 15 4 (H) 11 6 - 15 1 %    Platelets 302 (L) 999 - 390 Thousands/uL    MPV 10 7 8 9 - 12 7 fL   Ferritin   Result Value Ref Range    Ferritin 172 8 - 388 ng/mL   Iron Saturation %   Result Value Ref Range    Iron Saturation 29 %    TIBC 296 250 - 450 ug/dL    Iron 86 65 - 175 ug/dL   Basic metabolic panel   Result Value Ref Range    Sodium 137 136 - 145 mmol/L    Potassium 4 5 3 5 - 5 3 mmol/L    Chloride 103 100 - 108 mmol/L    CO2 28 21 - 32 mmol/L    ANION GAP 6 4 - 13 mmol/L    BUN 24 5 - 25 mg/dL    Creatinine 1 33 (H) 0 60 - 1 30 mg/dL    Glucose, Fasting 126 (H) 65 - 99 mg/dL    Calcium 9 2 8 3 - 10 1 mg/dL    eGFR 52 ml/min/1 73sq m   Magnesium   Result Value Ref Range    Magnesium 2 2 1 6 - 2 6 mg/dL   Phosphorus   Result Value Ref Range    Phosphorus 3 2 2 3 - 4 1 mg/dL

## 2018-08-28 NOTE — PATIENT INSTRUCTIONS
ASSESSMENT and PLAN:  1  Chronic kidney disease, baseline creatinine 1 3-1 4, current GFR 52  2  Solitary kidney function with history of nephrectomy  3  History of coronary disease, volume status appears stable  4  History of transitional cell carcinoma of the renal pelvis, status post chemotherapy with carboplatin and gemcitabine,    · Overall renal function remains stable  · Volume status and blood pressure stable  · Given stability of renal function post surgery, post chemotherapy as well as recent hospitalization, will plan to seem approximately 1 year with repeat labs at that time

## 2018-08-29 DIAGNOSIS — N30.00 ACUTE CYSTITIS WITHOUT HEMATURIA: Primary | ICD-10-CM

## 2018-08-29 LAB — BACTERIA UR CULT: ABNORMAL

## 2018-08-29 RX ORDER — SULFAMETHOXAZOLE AND TRIMETHOPRIM 800; 160 MG/1; MG/1
1 TABLET ORAL EVERY 12 HOURS SCHEDULED
Qty: 10 TABLET | Refills: 0 | Status: SHIPPED | OUTPATIENT
Start: 2018-08-29 | End: 2018-09-03

## 2018-09-19 ENCOUNTER — PROCEDURE VISIT (OUTPATIENT)
Dept: UROLOGY | Facility: MEDICAL CENTER | Age: 73
End: 2018-09-19
Payer: MEDICARE

## 2018-09-19 VITALS — WEIGHT: 200 LBS | HEIGHT: 69 IN | BODY MASS INDEX: 29.62 KG/M2

## 2018-09-19 DIAGNOSIS — N30.00 ACUTE CYSTITIS WITHOUT HEMATURIA: ICD-10-CM

## 2018-09-19 DIAGNOSIS — D09.0 CARCINOMA IN SITU OF BLADDER: Primary | ICD-10-CM

## 2018-09-19 PROCEDURE — 88112 CYTOPATH CELL ENHANCE TECH: CPT | Performed by: PATHOLOGY

## 2018-09-19 PROCEDURE — 99213 OFFICE O/P EST LOW 20 MIN: CPT | Performed by: UROLOGY

## 2018-09-19 PROCEDURE — 87086 URINE CULTURE/COLONY COUNT: CPT | Performed by: UROLOGY

## 2018-09-19 PROCEDURE — 52000 CYSTOURETHROSCOPY: CPT | Performed by: UROLOGY

## 2018-09-19 RX ORDER — ERYTHROMYCIN 5 MG/G
OINTMENT OPHTHALMIC
COMMUNITY
Start: 2018-08-01 | End: 2018-09-19 | Stop reason: ALTCHOICE

## 2018-09-19 RX ORDER — AMLODIPINE BESYLATE 2.5 MG/1
2.5 TABLET ORAL DAILY
COMMUNITY
Start: 2017-04-24 | End: 2018-09-19 | Stop reason: ALTCHOICE

## 2018-09-19 RX ORDER — ATORVASTATIN CALCIUM 20 MG/1
20 TABLET, FILM COATED ORAL DAILY
COMMUNITY
End: 2019-01-25 | Stop reason: SDUPTHER

## 2018-09-19 NOTE — PROGRESS NOTES
Assessment/Plan:  1  Cytology taken, if it is positive, I will discuss bladder biopsy with him  Also urine culture, had a positive culture two weeks ago    2  Not much help for the incontinence with all the surgery and treatments he has had  3   If cytology negative, follow-up cysto three months  No problem-specific Assessment & Plan notes found for this encounter  Diagnoses and all orders for this visit:    Carcinoma in situ of bladder  -     Cytology, urine    Acute cystitis without hematuria  -     Urine culture    Other orders  -     Discontinue: amLODIPine (NORVASC) 2 5 mg tablet; Take 2 5 mg by mouth daily  -     Discontinue: Calcium Carbonate-Vitamin D3 600-400 MG-UNIT TABS; Take 1,000 Units by mouth daily  -     Discontinue: erythromycin (ILOTYCIN) ophthalmic ointment;   -     atorvastatin (LIPITOR) 20 mg tablet; Take 20 mg by mouth daily  -     Cystoscopy          Subjective:      Patient ID: Genia Taylor is a 74 y o  male  Follow-up for history of carcinoma in situ of bladder, status post nephrectomy  Prostate cancer status post radical prostatectomy and radiation therapy  Incontinence due to above treatments  Finished chemo eight months ago for metastatic disease, recent imaging shows no evidence of Mets at present time  The following portions of the patient's history were reviewed and updated as appropriate: allergies, current medications, past family history, past medical history, past social history, past surgical history and problem list     Review of Systems   All other systems reviewed and are negative  Objective:      Ht 5' 9" (1 753 m)   Wt 90 7 kg (200 lb)   BMI 29 53 kg/m²          Physical Exam   Genitourinary:   Genitourinary Comments: Penis testes no lesions       Cystoscopy  Date/Time: 9/19/2018 2:39 PM  Performed by: Viktoria Srinivasan  Authorized by: Viktoria Srinivasan     Procedure details: cystoscopy    Additional Procedure Details:    The patient was carefully  positioned supine on the examining table  Sterile preparation was performed on the urethra  Xylocaine jelly was instilled and left  Indwelling for the procedure  The 13 Sinhala flexible cystoscope was passed with the following findings:      Urethra:   No strictures    Prostate:  Surgically absent    Bladder:   Several patches of minimal erythema posterior wall and right lateral and anterior wall  Nothing papillary or ulcerated     Residual urine:  Minimal    Patient tolerated the procedure well and was escorted from the examining table

## 2018-09-19 NOTE — LETTER
September 19, 2018     Kyle De La Rosa MD  [address] [employer]  [address] [place] [address]    Patient: Claudio Peterson   YOB: 1945   Date of Visit: 9/19/2018       Dear Dr Delon Hardy: Thank you for referring Chele Parra to me for evaluation  Below are my notes for this consultation  If you have questions, please do not hesitate to call me  I look forward to following your patient along with you  Sincerely,        Any Worthy MD        CC: MD Any Hicks MD  9/19/2018  2:40 PM  Sign at close encounter  Assessment/Plan:  1  Cytology taken, if it is positive, I will discuss bladder biopsy with him  Also urine culture, had a positive culture two weeks ago    2  Not much help for the incontinence with all the surgery and treatments he has had  3   If cytology negative, follow-up cysto three months  No problem-specific Assessment & Plan notes found for this encounter  Diagnoses and all orders for this visit:    Carcinoma in situ of bladder  -     Cytology, urine    Acute cystitis without hematuria  -     Urine culture    Other orders  -     Discontinue: amLODIPine (NORVASC) 2 5 mg tablet; Take 2 5 mg by mouth daily  -     Discontinue: Calcium Carbonate-Vitamin D3 600-400 MG-UNIT TABS; Take 1,000 Units by mouth daily  -     Discontinue: erythromycin (ILOTYCIN) ophthalmic ointment;   -     atorvastatin (LIPITOR) 20 mg tablet; Take 20 mg by mouth daily  -     Cystoscopy          Subjective:      Patient ID: Claudio Peterson is a 74 y o  male  Follow-up for history of carcinoma in situ of bladder, status post nephrectomy  Prostate cancer status post radical prostatectomy and radiation therapy  Incontinence due to above treatments  Finished chemo eight months ago for metastatic disease, recent imaging shows no evidence of Mets at present time          The following portions of the patient's history were reviewed and updated as appropriate: allergies, current medications, past family history, past medical history, past social history, past surgical history and problem list     Review of Systems   All other systems reviewed and are negative  Objective:      Ht 5' 9" (1 753 m)   Wt 90 7 kg (200 lb)   BMI 29 53 kg/m²           Physical Exam   Genitourinary:   Genitourinary Comments: Penis testes no lesions       Cystoscopy  Date/Time: 9/19/2018 2:39 PM  Performed by: Josh Robbins  Authorized by: Josh Robbins     Procedure details: cystoscopy    Additional Procedure Details: The patient was carefully  positioned supine on the examining table  Sterile preparation was performed on the urethra  Xylocaine jelly was instilled and left  Indwelling for the procedure  The 13 Turkish flexible cystoscope was passed with the following findings:      Urethra:   No strictures    Prostate:  Surgically absent    Bladder:   Several patches of minimal erythema posterior wall and right lateral and anterior wall  Nothing papillary or ulcerated     Residual urine:  Minimal    Patient tolerated the procedure well and was escorted from the examining table

## 2018-09-20 LAB — BACTERIA UR CULT: NORMAL

## 2018-09-21 ENCOUNTER — TELEPHONE (OUTPATIENT)
Dept: UROLOGY | Facility: MEDICAL CENTER | Age: 73
End: 2018-09-21

## 2018-10-08 ENCOUNTER — OFFICE VISIT (OUTPATIENT)
Dept: UROLOGY | Facility: MEDICAL CENTER | Age: 73
End: 2018-10-08
Payer: MEDICARE

## 2018-10-08 VITALS
BODY MASS INDEX: 25.62 KG/M2 | HEIGHT: 70 IN | SYSTOLIC BLOOD PRESSURE: 142 MMHG | WEIGHT: 179 LBS | DIASTOLIC BLOOD PRESSURE: 80 MMHG

## 2018-10-08 DIAGNOSIS — N39.3 STRESS INCONTINENCE OF URINE: ICD-10-CM

## 2018-10-08 DIAGNOSIS — D09.0 CIS (CARCINOMA IN SITU OF BLADDER): Primary | ICD-10-CM

## 2018-10-08 PROCEDURE — 99214 OFFICE O/P EST MOD 30 MIN: CPT | Performed by: UROLOGY

## 2018-10-08 NOTE — LETTER
October 8, 2018     MD Rupesh Azevedo   [address]  [address] [facility]    Patient: Gricelda Meza   YOB: 1945   Date of Visit: 10/8/2018       Dear Dr Lynne Levi: Thank you for referring Bette Lemus to me for evaluation  Below are my notes for this consultation  If you have questions, please do not hesitate to call me  I look forward to following your patient along with you  Sincerely,        Meghna Nails MD        CC: MD Meghna Combs MD  10/8/2018  5:17 PM  Sign at close encounter  Assessment/Plan:  1  The highly suspicious cytology and erythema lesions in the bladder need biopsy  I also recommend retrograde cytology collection of the right kidney, and it anything seen on retro, possible flexible ureteroscopy of that solitary right kidney for diagnosis  2   I have offered them a second opinion again, they saw Winnie Rizvi in the past     3   Lots of questions in discussion regarding what we will do if the biopsies positive for cancer or carcinoma situ  We discussed that  At this point we will schedule the bladder biopsy and TUR of any lesions in the bladder, retrograde cytology, and mitomycin in the bladder  Potential complications discussed and they consent  No problem-specific Assessment & Plan notes found for this encounter  Diagnoses and all orders for this visit:    CIS (carcinoma in situ of bladder)    Stress incontinence of urine    Other orders  -     Urine culture; Future          Subjective:      Patient ID: Gricelda Meza is a 74 y o  male  1   Follow-up for CIS of bladder  Urine cytology last month was highly suspicious for recurrent cancer cells  Cysto at that time showed several patches of erythema with ulceration on the bladder wall  2   Stress incontinence persists, recent culture negative          The following portions of the patient's history were reviewed and updated as appropriate: allergies, current medications, past family history, past medical history, past social history, past surgical history and problem list     Review of Systems   All other systems reviewed and are negative  Objective:      /80 (BP Location: Left arm, Patient Position: Sitting)   Ht 5' 10" (1 778 m)   Wt 81 2 kg (179 lb)   BMI 25 68 kg/m²           Physical Exam   Constitutional: He is oriented to person, place, and time  He appears well-developed and well-nourished  No distress  HENT:   Head: Normocephalic and atraumatic  Eyes: Conjunctivae are normal    Cardiovascular: Normal rate and regular rhythm  Pulmonary/Chest: Effort normal and breath sounds normal  No respiratory distress  He has no wheezes  Abdominal: Soft  Bowel sounds are normal  He exhibits no distension and no mass  There is no tenderness  Neurological: He is alert and oriented to person, place, and time  Skin: Skin is warm and dry  He is not diaphoretic

## 2018-10-08 NOTE — PROGRESS NOTES
Assessment/Plan:  1  The highly suspicious cytology and erythema lesions in the bladder need biopsy  I also recommend retrograde cytology collection of the right kidney, and it anything seen on retro, possible flexible ureteroscopy of that solitary right kidney for diagnosis  2   I have offered them a second opinion again, they saw Joesph Lewis in the past     3   Lots of questions in discussion regarding what we will do if the biopsies positive for cancer or carcinoma situ  We discussed that  At this point we will schedule the bladder biopsy and TUR of any lesions in the bladder, retrograde cytology, and mitomycin in the bladder  Potential complications discussed and they consent  No problem-specific Assessment & Plan notes found for this encounter  Diagnoses and all orders for this visit:    CIS (carcinoma in situ of bladder)    Stress incontinence of urine    Other orders  -     Urine culture; Future          Subjective:      Patient ID: Zain Magana is a 74 y o  male  1   Follow-up for CIS of bladder  Urine cytology last month was highly suspicious for recurrent cancer cells  Cysto at that time showed several patches of erythema with ulceration on the bladder wall  2   Stress incontinence persists, recent culture negative  The following portions of the patient's history were reviewed and updated as appropriate: allergies, current medications, past family history, past medical history, past social history, past surgical history and problem list     Review of Systems   All other systems reviewed and are negative  Objective:      /80 (BP Location: Left arm, Patient Position: Sitting)   Ht 5' 10" (1 778 m)   Wt 81 2 kg (179 lb)   BMI 25 68 kg/m²          Physical Exam   Constitutional: He is oriented to person, place, and time  He appears well-developed and well-nourished  No distress  HENT:   Head: Normocephalic and atraumatic     Eyes: Conjunctivae are normal  Cardiovascular: Normal rate and regular rhythm  Pulmonary/Chest: Effort normal and breath sounds normal  No respiratory distress  He has no wheezes  Abdominal: Soft  Bowel sounds are normal  He exhibits no distension and no mass  There is no tenderness  Neurological: He is alert and oriented to person, place, and time  Skin: Skin is warm and dry  He is not diaphoretic

## 2018-10-09 ENCOUNTER — ANESTHESIA EVENT (OUTPATIENT)
Dept: PERIOP | Facility: HOSPITAL | Age: 73
End: 2018-10-09
Payer: MEDICARE

## 2018-10-09 PROBLEM — N32.89 BLADDER MASS: Status: ACTIVE | Noted: 2018-10-09

## 2018-10-09 PROCEDURE — 1124F ACP DISCUSS-NO DSCNMKR DOCD: CPT | Performed by: PATHOLOGY

## 2018-10-09 RX ORDER — SODIUM CHLORIDE 9 MG/ML
125 INJECTION, SOLUTION INTRAVENOUS CONTINUOUS
Status: CANCELLED | OUTPATIENT
Start: 2018-10-23

## 2018-10-10 ENCOUNTER — HOSPITAL ENCOUNTER (OUTPATIENT)
Dept: NON INVASIVE DIAGNOSTICS | Facility: HOSPITAL | Age: 73
Discharge: HOME | End: 2018-10-10
Attending: UROLOGY
Payer: MEDICARE

## 2018-10-10 ENCOUNTER — APPOINTMENT (OUTPATIENT)
Dept: LAB | Facility: HOSPITAL | Age: 73
End: 2018-10-10
Attending: UROLOGY
Payer: MEDICARE

## 2018-10-10 ENCOUNTER — APPOINTMENT (OUTPATIENT)
Dept: PREADMISSION TESTING | Facility: HOSPITAL | Age: 73
End: 2018-10-10
Payer: MEDICARE

## 2018-10-10 DIAGNOSIS — N32.89 BLADDER MASS: ICD-10-CM

## 2018-10-10 LAB
ALBUMIN SERPL BCP-MCNC: 3.6 G/DL (ref 3.5–5)
ALP SERPL-CCNC: 51 U/L (ref 46–116)
ALT SERPL W P-5'-P-CCNC: 58 U/L (ref 12–78)
ANION GAP SERPL CALCULATED.3IONS-SCNC: 6 MMOL/L (ref 4–13)
AST SERPL W P-5'-P-CCNC: 31 U/L (ref 5–45)
BASOPHILS # BLD AUTO: 0.03 THOUSANDS/ΜL (ref 0–0.1)
BASOPHILS NFR BLD AUTO: 1 % (ref 0–1)
BILIRUB SERPL-MCNC: 0.51 MG/DL (ref 0.2–1)
BUN SERPL-MCNC: 29 MG/DL (ref 5–25)
CALCIUM SERPL-MCNC: 9.2 MG/DL (ref 8.3–10.1)
CHLORIDE SERPL-SCNC: 101 MMOL/L (ref 100–108)
CO2 SERPL-SCNC: 30 MMOL/L (ref 21–32)
CREAT SERPL-MCNC: 1.71 MG/DL (ref 0.6–1.3)
EOSINOPHIL # BLD AUTO: 0.07 THOUSAND/ΜL (ref 0–0.61)
EOSINOPHIL NFR BLD AUTO: 1 % (ref 0–6)
ERYTHROCYTE [DISTWIDTH] IN BLOOD BY AUTOMATED COUNT: 14.9 % (ref 11.6–15.1)
GFR SERPL CREATININE-BSD FRML MDRD: 38 ML/MIN/1.73SQ M
GLUCOSE SERPL-MCNC: 117 MG/DL (ref 65–140)
HCT VFR BLD AUTO: 42 % (ref 36.5–49.3)
HGB BLD-MCNC: 13.6 G/DL (ref 12–17)
IMM GRANULOCYTES # BLD AUTO: 0.02 THOUSAND/UL (ref 0–0.2)
IMM GRANULOCYTES NFR BLD AUTO: 0 % (ref 0–2)
LYMPHOCYTES # BLD AUTO: 0.82 THOUSANDS/ΜL (ref 0.6–4.47)
LYMPHOCYTES NFR BLD AUTO: 16 % (ref 14–44)
MCH RBC QN AUTO: 31.7 PG (ref 26.8–34.3)
MCHC RBC AUTO-ENTMCNC: 32.4 G/DL (ref 31.4–37.4)
MCV RBC AUTO: 98 FL (ref 82–98)
MONOCYTES # BLD AUTO: 0.6 THOUSAND/ΜL (ref 0.17–1.22)
MONOCYTES NFR BLD AUTO: 12 % (ref 4–12)
NEUTROPHILS # BLD AUTO: 3.61 THOUSANDS/ΜL (ref 1.85–7.62)
NEUTS SEG NFR BLD AUTO: 70 % (ref 43–75)
NRBC BLD AUTO-RTO: 0 /100 WBCS
PLATELET # BLD AUTO: 176 THOUSANDS/UL (ref 149–390)
PMV BLD AUTO: 10.1 FL (ref 8.9–12.7)
POTASSIUM SERPL-SCNC: 4.5 MMOL/L (ref 3.5–5.3)
PROT SERPL-MCNC: 7.4 G/DL (ref 6.4–8.2)
RBC # BLD AUTO: 4.29 MILLION/UL (ref 3.88–5.62)
SODIUM SERPL-SCNC: 137 MMOL/L (ref 136–145)
WBC # BLD AUTO: 5.15 THOUSAND/UL (ref 4.31–10.16)

## 2018-10-10 PROCEDURE — 80053 COMPREHEN METABOLIC PANEL: CPT

## 2018-10-10 PROCEDURE — 87077 CULTURE AEROBIC IDENTIFY: CPT

## 2018-10-10 PROCEDURE — 85025 COMPLETE CBC W/AUTO DIFF WBC: CPT

## 2018-10-10 PROCEDURE — 87186 SC STD MICRODIL/AGAR DIL: CPT

## 2018-10-10 PROCEDURE — 36415 COLL VENOUS BLD VENIPUNCTURE: CPT

## 2018-10-10 PROCEDURE — 87086 URINE CULTURE/COLONY COUNT: CPT

## 2018-10-10 RX ORDER — PROCHLORPERAZINE MALEATE 10 MG
10 TABLET ORAL EVERY 6 HOURS PRN
COMMUNITY
End: 2020-02-25

## 2018-10-10 RX ORDER — FUROSEMIDE 20 MG/1
20 TABLET ORAL AS NEEDED
COMMUNITY

## 2018-10-10 RX ORDER — ONDANSETRON 8 MG/1
8 TABLET, ORALLY DISINTEGRATING ORAL EVERY 8 HOURS PRN
COMMUNITY
End: 2020-02-25

## 2018-10-10 NOTE — ANESTHESIA PREPROCEDURE EVALUATION
Review of Systems/Medical History  Patient summary reviewed  Chart reviewed      Cardiovascular  Hyperlipidemia, Hypertension , Valvular heart disease , mitral regurgitation, Past MI > 6 months, CAD , History of CABG, Dysrhythmias (RBBB) , Angina Stable,    Pulmonary  Pneumonia (childhood),        GI/Hepatic  Negative GI/hepatic ROS          Genitourinary malignancy Bladder cancer, Prostatic disorder, history of prostate cancer       Endo/Other  Negative endo/other ROS      GYN       Hematology  Anemia ,     Musculoskeletal       Neurology      Comment: ESSENTIAL TREMORS Psychology   Negative psychology ROS              Physical Exam    Airway    Mallampati score: I  TM Distance: >3 FB  Neck ROM: full     Dental   lower dentures,     Cardiovascular  Rhythm: regular, Rate: normal,     Pulmonary  Pulmonary exam normal Breath sounds clear to auscultation,     Other Findings        Anesthesia Plan  ASA Score- 3     Anesthesia Type- general with ASA Monitors  Additional Monitors:   Airway Plan: LMA  Plan Factors- Patient instructed to abstain from smoking on day of procedure       Induction- intravenous  Postoperative Plan-     Informed Consent- Anesthetic plan and risks discussed with patient

## 2018-10-10 NOTE — PRE-PROCEDURE INSTRUCTIONS
Pre-Surgery Instructions:   Medication Instructions    aspirin 81 mg chewable tablet Patient was instructed by Physician and understands   atorvastatin (LIPITOR) 20 mg tablet Instructed patient per Anesthesia Guidelines   Cyanocobalamin (B-12) 1000 MCG CAPS Instructed patient per Anesthesia Guidelines   furosemide (LASIX) 20 mg tablet Instructed patient per Anesthesia Guidelines   isosorbide mononitrate (IMDUR) 30 mg 24 hr tablet Instructed patient per Anesthesia Guidelines   metoprolol tartrate (LOPRESSOR) 50 mg tablet Instructed patient per Anesthesia Guidelines   Multiple Vitamin (MULTIVITAMIN) tablet Patient was instructed by Physician and understands   nitroglycerin (NITROSTAT) 0 4 mg SL tablet Instructed patient per Anesthesia Guidelines   ondansetron (ZOFRAN-ODT) 8 mg disintegrating tablet Instructed patient per Anesthesia Guidelines   prochlorperazine (COMPAZINE) 10 mg tablet Instructed patient per Anesthesia Guidelines   ranolazine (RANEXA) 500 mg 12 hr tablet Instructed patient per Anesthesia Guidelines  Instructed to take ranexa/ metoprolol and imdur am of surgery with sip of water per anesthesia DR Jacobo Maradiaga

## 2018-10-12 ENCOUNTER — TELEPHONE (OUTPATIENT)
Dept: UROLOGY | Facility: MEDICAL CENTER | Age: 73
End: 2018-10-12

## 2018-10-12 DIAGNOSIS — N30.00 ACUTE CYSTITIS WITHOUT HEMATURIA: Primary | ICD-10-CM

## 2018-10-12 LAB — BACTERIA UR CULT: ABNORMAL

## 2018-10-12 RX ORDER — NITROFURANTOIN 25; 75 MG/1; MG/1
100 CAPSULE ORAL DAILY
Qty: 21 CAPSULE | Refills: 0 | Status: SHIPPED | OUTPATIENT
Start: 2018-10-12 | End: 2018-11-02

## 2018-10-12 NOTE — TELEPHONE ENCOUNTER
Isreal Claudio MD  [address] [place] [address] Urology [place] [facility]             Tell patient, or wife, has bacteria in urine   I am starting low-dose Macrodantin, once per day, starting now through time of surgery, three weeks total   Will also give IV antibiotics at time of surgery      Pt's wife Mook Sosa notified via machine

## 2018-10-17 NOTE — H&P
HISTORY AND PHYSICAL  ? ? Patient Name: Alan Johnson  Patient MRN: [id number]  Attending Provider: Cheri Dee MD  Service: Urology  Chief Complaint    Possible recurrent bladder cancer, or carcinoma in situ  HPI   Alan Johnson is a 74 y o  male with   prior history ofcancer of the left renal pelvis, and carcinoma in situ of the bladder  Recent urine cytologies were suggestive of malignancy, cystoscopy showed patches of erythema and possible CIS in the bladder  I plan  cysto, TUR bladder lesions, right retrograde pyelogram and cytology collection  Potential risks and complications discussed, and informed consent was given by the patient  Medications  Meds/Allergies     No current facility-administered medications for this encounter  Cannot display prior to admission medications because the patient has not been admitted in this contact  No current facility-administered medications for this encounter       Current Outpatient Prescriptions:     aspirin 81 mg chewable tablet, Chew 81 mg daily Will stop 10/16 , Disp: , Rfl:     atorvastatin (LIPITOR) 20 mg tablet, Take 20 mg by mouth daily, Disp: , Rfl:     Cyanocobalamin (B-12) 1000 MCG CAPS, Take by mouth daily, Disp: , Rfl:     furosemide (LASIX) 20 mg tablet, Take 20 mg by mouth daily as needed, Disp: , Rfl:     isosorbide mononitrate (IMDUR) 30 mg 24 hr tablet, Take 1 tablet (30 mg total) by mouth daily, Disp: 90 tablet, Rfl: 3    metoprolol tartrate (LOPRESSOR) 50 mg tablet, Take 1 tablet (50 mg total) by mouth every 12 (twelve) hours, Disp: 180 tablet, Rfl: 3    Multiple Vitamin (MULTIVITAMIN) tablet, Take 1 tablet by mouth daily, Disp: , Rfl:     nitroglycerin (NITROSTAT) 0 4 mg SL tablet, Place 1 tablet (0 4 mg total) under the tongue every 5 (five) minutes as needed for chest pain, Disp: 25 tablet, Rfl: 5    ondansetron (ZOFRAN-ODT) 8 mg disintegrating tablet, Take 8 mg by mouth every 8 (eight) hours as needed for nausea or vomiting, Disp: , Rfl:     prochlorperazine (COMPAZINE) 10 mg tablet, Take 10 mg by mouth every 6 (six) hours as needed for nausea or vomiting, Disp: , Rfl:     ranolazine (RANEXA) 500 mg 12 hr tablet, Take 1 tablet (500 mg total) by mouth 2 (two) times a day, Disp: 180 tablet, Rfl: 3    nitrofurantoin (MACROBID) 100 mg capsule, Take 1 capsule (100 mg total) by mouth daily for 21 days, Disp: 21 capsule, Rfl: 0  Review of Systems  10 point review of systems negative except as noted in HPI  Allergies  Allergies   Allergen Reactions    Levaquin [Levofloxacin] Rash    Percocet [Oxycodone-Acetaminophen] GI Intolerance     Constipation,,,happens with narcotics like percocet    Lovastatin Other (See Comments)     myopathy     PMH  Past Medical History:   Diagnosis Date    Anesthesia     "can't urinate after surgery"    Basal cell carcinoma     left arm/ right eyelid/ right hand/ above right eyebrow in past    Bladder mass     Cataract     rosalind    Coronary artery disease     CABG X 2 2004    Essential tremor     of head and hands bilat    Hearing aid worn     bilat    History of kidney stones     History of pneumonia     childhood    History of prostate cancer     History of transfusion     2017    Hx of radiation therapy     2007 for after prostate surgery    Hyperlipidemia     Hypertension     Kidney stone     in past    [facility] [address] [facility]     Prostate CA ([facility] [facility] )     2002- had prostatectomy    Scab     black scab right thumb area- biopsy taken by md recently- bandaid over area    Transitional cell bladder cancer ([facility] [address] )     had BCG    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)     and" left kidney and left ureter removed" 2017- had chemo    Urinary incontinence     wears Depends    Urinary incontinence     Wears glasses     Wears glasses     Wears partial dentures     lower     Past surgical history  Past Surgical History:   Procedure Laterality Date    APPENDECTOMY      BACK SURGERY      lumbar herniated disc repair    CARDIAC CATHETERIZATION      COLONOSCOPY      CORONARY ARTERY BYPASS GRAFT      x2 in 2004   [address] N/A 7/3/2017    Procedure: BLADDER BIOPSY;  Surgeon: Nithin Puentes MD;  Location: [facility];  Service: Urology    CYSTOSCOPY W/ RETROGRADES Right 7/3/2017    Procedure: CYSTOSCOPY WITH RETROGRADE PYELOGRAM;  Surgeon: Nithin Puentes MD;  Location[facility];  Service: Urology    HERNIA REPAIR      groin    NEPHRECTOMY Left     and left ureter    PROSTATECTOMY      ROTATOR CUFF REPAIR Left     SHOULDER SURGERY      dislocation    SKIN CANCER EXCISION  2011    left arm and right eyebrow     Social history  Social History   Substance Use Topics    Smoking status: Never Smoker    Smokeless tobacco: Never Used    Alcohol use Yes      Comment: few x year     ? Physical Exam  General appearance: alert and oriented, in no acute distress  Head: Normocephalic, without obvious abnormality, atraumatic  Neck: no adenopathy, no carotid bruit, no JVD, supple, symmetrical, trachea midline and thyroid not enlarged, symmetric, no tenderness/mass/nodules  Back: symmetric, no curvature  ROM normal  No CVA tenderness    Lungs: clear to auscultation bilaterally  Chest wall: no tenderness  Heart: regular rate and rhythm, S1, S2 normal, no murmur, click, rub or gallop  Abdomen: soft, non-tender; bowel sounds normal; no masses,  no organomegaly  Male genitalia: normal  Extremities: extremities normal, warm and well-perfused; no cyanosis, clubbing, or edema  Neurologic: Grossly normal  Nithin Puentes MD

## 2018-10-23 ENCOUNTER — HOSPITAL ENCOUNTER (OUTPATIENT)
Facility: HOSPITAL | Age: 73
Setting detail: OUTPATIENT SURGERY
Discharge: HOME | End: 2018-10-23
Attending: UROLOGY | Admitting: UROLOGY
Payer: MEDICARE

## 2018-10-23 ENCOUNTER — ANESTHESIA (OUTPATIENT)
Dept: PERIOP | Facility: HOSPITAL | Age: 73
End: 2018-10-23
Payer: MEDICARE

## 2018-10-23 ENCOUNTER — HOSPITAL ENCOUNTER (OUTPATIENT)
Dept: RADIOLOGY | Facility: HOSPITAL | Age: 73
Setting detail: OUTPATIENT SURGERY
Discharge: HOME | End: 2018-10-23
Payer: MEDICARE

## 2018-10-23 VITALS
HEIGHT: 70 IN | DIASTOLIC BLOOD PRESSURE: 68 MMHG | WEIGHT: 179 LBS | TEMPERATURE: 97.6 F | HEART RATE: 58 BPM | BODY MASS INDEX: 25.62 KG/M2 | SYSTOLIC BLOOD PRESSURE: 141 MMHG | OXYGEN SATURATION: 96 % | RESPIRATION RATE: 16 BRPM

## 2018-10-23 DIAGNOSIS — N32.89 BLADDER MASS: ICD-10-CM

## 2018-10-23 PROCEDURE — 52240 CYSTOSCOPY AND TREATMENT: CPT | Performed by: UROLOGY

## 2018-10-23 PROCEDURE — 76000 FLUOROSCOPY <1 HR PHYS/QHP: CPT

## 2018-10-23 PROCEDURE — 88112 CYTOPATH CELL ENHANCE TECH: CPT | Performed by: PATHOLOGY

## 2018-10-23 PROCEDURE — 88307 TISSUE EXAM BY PATHOLOGIST: CPT | Performed by: PATHOLOGY

## 2018-10-23 RX ORDER — ONDANSETRON 2 MG/ML
4 INJECTION INTRAMUSCULAR; INTRAVENOUS ONCE AS NEEDED
Status: DISCONTINUED | OUTPATIENT
Start: 2018-10-23 | End: 2018-10-23 | Stop reason: HOSPADM

## 2018-10-23 RX ORDER — ONDANSETRON 2 MG/ML
INJECTION INTRAMUSCULAR; INTRAVENOUS AS NEEDED
Status: DISCONTINUED | OUTPATIENT
Start: 2018-10-23 | End: 2018-10-23 | Stop reason: SURG

## 2018-10-23 RX ORDER — FENTANYL CITRATE 50 UG/ML
INJECTION, SOLUTION INTRAMUSCULAR; INTRAVENOUS AS NEEDED
Status: DISCONTINUED | OUTPATIENT
Start: 2018-10-23 | End: 2018-10-23 | Stop reason: SURG

## 2018-10-23 RX ORDER — LIDOCAINE HYDROCHLORIDE 10 MG/ML
INJECTION, SOLUTION INFILTRATION; PERINEURAL AS NEEDED
Status: DISCONTINUED | OUTPATIENT
Start: 2018-10-23 | End: 2018-10-23 | Stop reason: SURG

## 2018-10-23 RX ORDER — SODIUM CHLORIDE 9 MG/ML
125 INJECTION, SOLUTION INTRAVENOUS CONTINUOUS
Status: DISCONTINUED | OUTPATIENT
Start: 2018-10-23 | End: 2018-10-23 | Stop reason: HOSPADM

## 2018-10-23 RX ORDER — FENTANYL CITRATE/PF 50 MCG/ML
50 SYRINGE (ML) INJECTION
Status: DISCONTINUED | OUTPATIENT
Start: 2018-10-23 | End: 2018-10-23 | Stop reason: HOSPADM

## 2018-10-23 RX ORDER — PROPOFOL 10 MG/ML
INJECTION, EMULSION INTRAVENOUS AS NEEDED
Status: DISCONTINUED | OUTPATIENT
Start: 2018-10-23 | End: 2018-10-23 | Stop reason: SURG

## 2018-10-23 RX ORDER — HYDROCODONE BITARTRATE AND ACETAMINOPHEN 5; 325 MG/1; MG/1
1-2 TABLET ORAL EVERY 6 HOURS PRN
Qty: 20 TABLET | Refills: 0 | Status: SHIPPED | OUTPATIENT
Start: 2018-10-23 | End: 2018-11-02

## 2018-10-23 RX ADMIN — PROPOFOL 200 MG: 10 INJECTION, EMULSION INTRAVENOUS at 13:56

## 2018-10-23 RX ADMIN — LIDOCAINE HYDROCHLORIDE 80 MG: 10 INJECTION, SOLUTION INFILTRATION; PERINEURAL at 13:56

## 2018-10-23 RX ADMIN — SODIUM CHLORIDE 125 ML/HR: 0.9 INJECTION, SOLUTION INTRAVENOUS at 13:26

## 2018-10-23 RX ADMIN — FENTANYL CITRATE 50 MCG: 50 INJECTION, SOLUTION INTRAMUSCULAR; INTRAVENOUS at 15:30

## 2018-10-23 RX ADMIN — FENTANYL CITRATE 50 MCG: 50 INJECTION, SOLUTION INTRAMUSCULAR; INTRAVENOUS at 13:57

## 2018-10-23 RX ADMIN — CEFAZOLIN SODIUM 2000 MG: 2 SOLUTION INTRAVENOUS at 14:04

## 2018-10-23 RX ADMIN — FENTANYL CITRATE 50 MCG: 50 INJECTION, SOLUTION INTRAMUSCULAR; INTRAVENOUS at 14:11

## 2018-10-23 RX ADMIN — SODIUM CHLORIDE: 0.9 INJECTION, SOLUTION INTRAVENOUS at 14:21

## 2018-10-23 RX ADMIN — ONDANSETRON HYDROCHLORIDE 4 MG: 2 INJECTION, SOLUTION INTRAVENOUS at 14:21

## 2018-10-23 RX ADMIN — DEXAMETHASONE SODIUM PHOSPHATE 4 MG: 10 INJECTION INTRAMUSCULAR; INTRAVENOUS at 14:21

## 2018-10-23 NOTE — DISCHARGE SUMMARY
Discharge Summary - Michaela Wright 74 y o  male MRN: [id number]    Admission Date: 10/23/2018     Admitting Diagnosis: Bladder mass [N32 89]    Procedures Performed:  Cysto, retrograde pyelogram, bladder biopsy    Patient underwent planned outpt surgery and recovered without complication  Discharged in good condition  Medications were prescribed  Pt knows to call the office for followup in three days

## 2018-10-23 NOTE — OP NOTE
OPERATIVE REPORT  PATIENT NAME: Winnie Rodriguez    :  1945  MRN: [id number]  Pt Location: [facility] 02    SURGERY DATE: 10/23/2018    Surgeon(s) and Role:     * Rod Brandt MD - Primary    Preop Diagnosis:  Bladder mass [N32 89]    Post-Op Diagnosis Codes: * Bladder mass [N32 89]    Procedure(s) (LRB):  CYSTO, RETROGRADE, TURBT (Right)  INSTILLATION MITOMYCIN (N/A)  Bladder mass 5 cm maximum diameter  Specimen(s):  ID Type Source Tests Collected by Time Destination   1 : Right renal pelvis washing Washing Renal Washing, Right NON-GYNECOLOGIC CYTOLOGY Rod Brandt MD 10/23/2018 1418    2 : Bladder Tumor Multiple Sites  Tissue Urinary Bladder TISSUE EXAM Rod Brandt MD 10/23/2018 1431        Estimated Blood Loss:   Minimal    Drains:  Urethral Catheter Latex 12 Fr  (Active)   Number of days: 77       Urethral Catheter Latex; Double-lumen 22 Fr  (Active)   Site Assessment Clean;Skin intact 10/23/2018  2:50 PM   Collection Container Standard drainage bag 10/23/2018  2:50 PM   Securement Method Securing device (Describe) 10/23/2018  2:50 PM   Output (mL) 200 mL 10/23/2018  2:40 PM   Number of days: 0       Anesthesia Type:   General    Operative Indications:  Bladder mass [N32 89]  History of transitional cell carcinoma left renal pelvis, suspicious cytology    Operative Findings:  1  Normal right retrograde pyelogram, cytology taken  2  Erythematous lesions in bladder, numerous biopsies taken  Complications:   None    Procedure and Technique:  After the patient was placed under adequate general anesthesia, he was prepped and draped using Betadine solution in lithotomy position  The 25 Vietnamese scope was passed without difficulty in the urethra, which had no stenosis  The prostate had undergone radical prostatectomy in the past, and the bladder neck was somewhat wide open    The bladder was closely inspected and found to have several patches of erythema, suspicious for recurrent cancer, maximum diameter 5 cm right lateral to anterior wall  Right retrograde pyelogram was performed in standard fashion, showing no lesions, filling defects, tumors of the upper urinary tract  Catheters passed up the renal pelvis and cytology was taken by washing saline in and out  No significant bleeding was seen at the end the cytology washing  Using the resectoscope loop, tumor was resected into, but not through muscle, care was taken not to perforate the bladder wall  All tumor fragments were taken out for specimen  Cautery is used in a gentle fashion to cauterize any biopsy sites  After thorough inspection to confirm no significant bleeding, the scope removed, Stockton catheter inserted  Mitomycin instilled for 60 minutes time  Patient was taken recovery in good condition     I was present for the entire procedure    Patient Disposition:  PACU     SIGNATURE: Shayy Davidson MD  DATE: October 23, 2018  TIME: 4:19 PM

## 2018-10-23 NOTE — DISCHARGE INSTRUCTIONS
ALL YOUR  PREVIOUS MEDS ARE THE SAME  NEW MEDS:  Hydrocodone if needed for pain    EXPECT SOME BLOOD IN URINE, BURNING, FREQUENT URINATION  --------------------------------------------------------------------------------------------------------------------------------------------------------------------------------------------------------------------------------------------------------------------------------------    Transurethral Resection of Bladder Tumors   WHAT YOU NEED TO KNOW:   Transurethral resection of bladder tumors (TURBT) is surgery to remove one or more tumors from your bladder  DISCHARGE INSTRUCTIONS:   Medicines:   · Medicines  help decrease pain or prevent vomiting  · Take your medicine as directed  Contact your healthcare provider if you think your medicine is not helping or if you have side effects  Tell him or her if you are allergic to any medicine  Keep a list of the medicines, vitamins, and herbs you take  Include the amounts, and when and why you take them  Bring the list or the pill bottles to follow-up visits  Carry your medicine list with you in case of an emergency  Follow up with your healthcare provider as directed:  Write down your questions so you remember to ask them during your visits  Care for your Stockton catheter:  Keep the bag below your waist  This will prevent urine from flowing back into your bladder and causing an infection or other problems  Also, keep the tube free of kinks so the urine will drain properly  Do not pull on the catheter  This can cause pain and bleeding and may cause the catheter to come out  Empty your urine drainage bag when it is ½ to ? full, or every 8 hours  If you have a smaller leg bag, empty it every 3 to 4 hours  Do the following when you empty your urine drainage bag:  · Hold the urine bag over the toilet or a large container  · Remove the drain spout from its sleeve at the bottom of the urine bag   Do not touch the tip of the drain spout  Open the slide valve on the spout  · Let the urine flow out of the urine bag into the toilet or container  Do not let the drainage tube touch anything  · Clean the end of the drain spout with alcohol when the bag is empty  Ask which cleaning solution is best to use  · Close the slide valve and put the drain spout into its sleeve at the bottom of the urine bag  Write down how much urine was in your bag if you were asked to keep a record  Contact your healthcare provider if:   · You have a fever or chills  · You have new or more blood in your urine  · You have nausea or are vomiting  · You have new or more pain when you urinate  · You are unable to control when you urinate  · You have questions or concerns about your condition or care  Seek care immediately or call 911 if:   · You have heavy bleeding from your urethra  · You start to urinate less often, very little, or not at all  · You have severe pain in your abdomen or pelvis  © 2017 [facility] Information is for End User's use only and may not be sold, redistributed or otherwise used for commercial purposes  All illustrations and images included in CareNotes® are the copyrighted property of A D A M , Inc  or Roly Ocampo  The above information is an  only  It is not intended as medical advice for individual conditions or treatments  Talk to your doctor, nurse or pharmacist before following any medical regimen to see if it is safe and effective for you

## 2018-10-24 ENCOUNTER — TELEPHONE (OUTPATIENT)
Dept: UROLOGY | Facility: AMBULATORY SURGERY CENTER | Age: 73
End: 2018-10-24

## 2018-10-24 NOTE — TELEPHONE ENCOUNTER
Spoke with the patient's wife;  Appointment for Stockton removal has been made for 10/26/2018 at 10:00 AM

## 2018-10-24 NOTE — TELEPHONE ENCOUNTER
Patient needs to schedule a nurse visit to remove catheter from his procedure  Stated it needed to come out on Friday  Please advise

## 2018-10-26 ENCOUNTER — CLINICAL SUPPORT (OUTPATIENT)
Dept: UROLOGY | Facility: MEDICAL CENTER | Age: 73
End: 2018-10-26
Payer: MEDICARE

## 2018-10-26 VITALS
DIASTOLIC BLOOD PRESSURE: 80 MMHG | HEIGHT: 70 IN | SYSTOLIC BLOOD PRESSURE: 132 MMHG | BODY MASS INDEX: 25.34 KG/M2 | WEIGHT: 177 LBS

## 2018-10-26 DIAGNOSIS — D09.0 CARCINOMA IN SITU OF BLADDER: Primary | ICD-10-CM

## 2018-10-26 PROCEDURE — 99211 OFF/OP EST MAY X REQ PHY/QHP: CPT

## 2018-10-26 NOTE — PROGRESS NOTES
Procedures   Patient returns for Stockton removal  Stockton removed without difficulty, patient tolerated procedure well  Urine draining clear yellow  Denies fever or urinary symptoms  Patient instructed to increase fluids and limit caffeine intake  To return to office if unable to void within 4-6 hours, or has increased discomfort

## 2018-10-28 NOTE — PROGRESS NOTES
I have reviewed the notes, assessments, and/or procedures performed , I concur with her/his documentation of Nicolette Vargas

## 2018-11-01 ENCOUNTER — TELEPHONE (OUTPATIENT)
Dept: UROLOGY | Facility: MEDICAL CENTER | Age: 73
End: 2018-11-01

## 2018-11-06 ENCOUNTER — TELEPHONE (OUTPATIENT)
Dept: UROLOGY | Facility: AMBULATORY SURGERY CENTER | Age: 73
End: 2018-11-06

## 2018-11-06 DIAGNOSIS — N30.90 CYSTITIS: Primary | ICD-10-CM

## 2018-11-06 NOTE — TELEPHONE ENCOUNTER
Pt will start BCG on 11/23  Because of incontinence 12 fr hsieh to be used and clamped for first hour  Hsieh to be drained then and pt to be discharged home  Will do culture by 11/16  Dr Stevie Suarez may start pt on low dose Macrodantin due to chronic bacteruria  Wife still in the process of setting up appt with Dr Kiran Maurice at [facility]

## 2018-11-06 NOTE — TELEPHONE ENCOUNTER
Spoke with wife, patient would like a call back from a nurse  She said they were supposed to set up an infusion appointment, but they haven't heard anyone  Please call back

## 2018-11-08 ENCOUNTER — TELEPHONE (OUTPATIENT)
Dept: UROLOGY | Facility: MEDICAL CENTER | Age: 73
End: 2018-11-08

## 2018-11-08 NOTE — TELEPHONE ENCOUNTER
Pt's wife calling would like a call back regarding seeing Dr Ame Schumacher, please advise     [phone]

## 2018-11-08 NOTE — TELEPHONE ENCOUNTER
Spoke w/Narcisa about needing signed release form  I will e-mail form to her at [email or web address]

## 2018-11-09 ENCOUNTER — TELEPHONE (OUTPATIENT)
Dept: UROLOGY | Facility: MEDICAL CENTER | Age: 73
End: 2018-11-09

## 2018-11-15 ENCOUNTER — APPOINTMENT (OUTPATIENT)
Dept: LAB | Facility: MEDICAL CENTER | Age: 73
End: 2018-11-15
Attending: UROLOGY
Payer: MEDICARE

## 2018-11-15 DIAGNOSIS — N30.90 CYSTITIS: ICD-10-CM

## 2018-11-15 PROCEDURE — 87086 URINE CULTURE/COLONY COUNT: CPT

## 2018-11-15 PROCEDURE — 87186 SC STD MICRODIL/AGAR DIL: CPT

## 2018-11-15 PROCEDURE — 87077 CULTURE AEROBIC IDENTIFY: CPT

## 2018-11-15 PROCEDURE — 87147 CULTURE TYPE IMMUNOLOGIC: CPT

## 2018-11-16 ENCOUNTER — TELEPHONE (OUTPATIENT)
Dept: UROLOGY | Facility: MEDICAL CENTER | Age: 73
End: 2018-11-16

## 2018-11-16 NOTE — TELEPHONE ENCOUNTER
Pt would like a call back regarding UC results when in, aware of on-call dr over weekend, will call tomorrow    [phone]

## 2018-11-17 LAB
BACTERIA UR CULT: ABNORMAL
BACTERIA UR CULT: ABNORMAL

## 2018-11-19 ENCOUNTER — TELEPHONE (OUTPATIENT)
Dept: UROLOGY | Facility: MEDICAL CENTER | Age: 73
End: 2018-11-19

## 2018-11-19 DIAGNOSIS — B95.2 UTI (URINARY TRACT INFECTION) DUE TO ENTEROCOCCUS: Primary | ICD-10-CM

## 2018-11-19 DIAGNOSIS — N39.0 UTI (URINARY TRACT INFECTION) DUE TO ENTEROCOCCUS: Primary | ICD-10-CM

## 2018-11-19 RX ORDER — AMPICILLIN 500 MG/1
500 CAPSULE ORAL 3 TIMES DAILY
Qty: 21 CAPSULE | Refills: 0 | Status: SHIPPED | OUTPATIENT
Start: 2018-11-19 | End: 2018-11-26

## 2018-11-19 NOTE — TELEPHONE ENCOUNTER
----- Message from Brett Merrill MD sent at 11/19/2018 12:41 PM EST -----  Please call the patient regarding his abnormal result  Ampicillin ordered 500 mg p o  3 times daily x1 week    If he starts the antibiotic I think he can proceed with BCG treatment

## 2018-11-19 NOTE — TELEPHONE ENCOUNTER
Spoke with the patient's wife: I informed her of the patient's positive culture results  An antibiotic has been prescribed to the patient's preferred pharmacy  Per Nathen Pagan, the patient is okay to proceed with his BCG treatments  She'd like a call back about scheduling the BCGs from our nurse who keeps track of scheduling them  I'll let her know that she should give the patient a call

## 2018-11-19 NOTE — TELEPHONE ENCOUNTER
Spoke to wife  Told her we can proceed with BCG  Pt to start antibiotics right away  Will test urine sample prior to BCG

## 2018-11-19 NOTE — TELEPHONE ENCOUNTER
Spoke to wife  Pt's culture is positive  Scheduled for BCG on 11/23  Will address with Dr Angeli Pagan  Pt currently asymptomatic

## 2018-11-23 ENCOUNTER — CLINICAL SUPPORT (OUTPATIENT)
Dept: UROLOGY | Facility: MEDICAL CENTER | Age: 73
End: 2018-11-23
Payer: MEDICARE

## 2018-11-23 VITALS
DIASTOLIC BLOOD PRESSURE: 78 MMHG | WEIGHT: 178 LBS | HEIGHT: 70 IN | BODY MASS INDEX: 25.48 KG/M2 | SYSTOLIC BLOOD PRESSURE: 120 MMHG

## 2018-11-23 DIAGNOSIS — D09.0 CIS (CARCINOMA IN SITU OF BLADDER): Primary | ICD-10-CM

## 2018-11-23 DIAGNOSIS — C67.9 MALIGNANT NEOPLASM OF URINARY BLADDER, UNSPECIFIED SITE (HCC): Primary | ICD-10-CM

## 2018-11-23 LAB
SL AMB  POCT GLUCOSE, UA: NEGATIVE
SL AMB LEUKOCYTE ESTERASE,UA: ABNORMAL
SL AMB POCT BILIRUBIN,UA: NEGATIVE
SL AMB POCT BLOOD,UA: ABNORMAL
SL AMB POCT CLARITY,UA: CLEAR
SL AMB POCT COLOR,UA: YELLOW
SL AMB POCT KETONES,UA: NEGATIVE
SL AMB POCT NITRITE,UA: NEGATIVE
SL AMB POCT PH,UA: 5.5
SL AMB POCT SPECIFIC GRAVITY,UA: >=1.03
SL AMB POCT URINE PROTEIN: ABNORMAL
SL AMB POCT UROBILINOGEN: 0.2

## 2018-11-23 PROCEDURE — 81003 URINALYSIS AUTO W/O SCOPE: CPT | Performed by: UROLOGY

## 2018-11-23 PROCEDURE — 51720 TREATMENT OF BLADDER LESION: CPT | Performed by: UROLOGY

## 2018-11-23 RX ORDER — A/SINGAPORE/GP1908/2015 IVR-180 (H1N1) (AN A/MICHIGAN/45/2015 (H1N1)PDM09-LIKE VIRUS), A/HONG KONG/4801/2014, NYMC X-263B (H3N2) (AN A/HONG KONG/4801/2014-LIKE VIRUS), AND B/BRISBANE/60/2008, WILD TYPE (A B/BRISBANE/60/2008-LIKE VIRUS) 15; 15; 15 UG/.5ML; UG/.5ML; UG/.5ML
INJECTION, SUSPENSION INTRAMUSCULAR
Refills: 0 | COMMUNITY
Start: 2018-11-01 | End: 2020-02-25

## 2018-11-23 NOTE — PROGRESS NOTES
Bladder instillation     Date/Time 11/23/2018 10:19 AM     Performed by  Louise Pham by Sara November       Consent: Verbal consent obtained  Consent given by: patient  Patient understanding: patient states understanding of the procedure being performed  Patient identity confirmed: verbally with patient      Preparation: Patient was prepped and draped in the usual sterile fashion  Site preparation: Betadine   Procedure Details   Procedure Notes: Risks, benefits and some of the potential complications of the procedure were discussed at length with the patient including infection, bleeding, voiding discomfort, urinary retention, fever, chills, sepsis and others  All questions were answered  Sterile technique and intraurethral analgesia were used  A 12 Polish hsieh catheter was gently placed through the urethra and into the patient's bladder  The bladder was emptied of all urine  Urine was collected for UA  Per FRED Ivy to proceed with instillation  Approximately 50 cc's of BCG was instilled into the bladder via catheter tubing and gravity  The catheter was clamp  The patient remained in the room and rotated every 10 mins  After 40 mins, the pt complained of leaking, that his buttocks felt wet  He was leaking around the catheter  I consulted Dr Stevie Suarez, who ordered the catheter moved  The catheter was unclamped after 50 mins  per Dr Stevie Suarez,   the bladder was drained into a urinal, and the catheter removed  The pt was cleaned  The contents of the drainage was bleached and was disposed of properly   The procedure was tolerated well without complications The patient was instructed to call the office for bloody urine, difficulty urinating, painful urination, fever, chills, nausea, vomiting  The patient stated they understood these instructions and would comply  Dr Stevie Suarez suggested a larger catheter next time

## 2018-11-26 ENCOUNTER — TELEPHONE (OUTPATIENT)
Dept: UROLOGY | Facility: AMBULATORY SURGERY CENTER | Age: 73
End: 2018-11-26

## 2018-11-26 DIAGNOSIS — R35.0 URINARY FREQUENCY: Primary | ICD-10-CM

## 2018-11-26 NOTE — TELEPHONE ENCOUNTER
Per Dr Carmel Best, he should finish ABX today and go for urine culture on Weds  He should call on Friday for the results, BCG may be postponed if he still has UTI  Dr Carmel Best does not think Oxybutynin would help

## 2018-11-26 NOTE — TELEPHONE ENCOUNTER
Called pt's wife  They have appt with Dr Eliezer Arndt at [facility]  Pt's is having more leaking and is getting up 3 times at night  This has increased since his surgery  He will finish ABX today  I will discuss with Dr Levon Rapp about medication for bladder spasms/frequency, and repeating urine culture after ABX and prior to next instillation

## 2018-11-26 NOTE — TELEPHONE ENCOUNTER
Wife of the patient called to speak to nurse regarding medical records started bcg on 11/23/18 and the one coming up  They are requesting transcripts for this treatment faxed to [facility]  Fax is [phone] This is to be sent after next Friday when treatment is complete  Also has questions for the nurse regarding other issues

## 2018-11-28 ENCOUNTER — APPOINTMENT (OUTPATIENT)
Dept: LAB | Facility: MEDICAL CENTER | Age: 73
End: 2018-11-28
Attending: UROLOGY
Payer: MEDICARE

## 2018-11-28 DIAGNOSIS — R35.0 URINARY FREQUENCY: ICD-10-CM

## 2018-11-28 PROCEDURE — 87086 URINE CULTURE/COLONY COUNT: CPT

## 2018-11-29 ENCOUNTER — TELEPHONE (OUTPATIENT)
Dept: UROLOGY | Facility: MEDICAL CENTER | Age: 73
End: 2018-11-29

## 2018-11-29 LAB — BACTERIA UR CULT: NORMAL

## 2018-11-29 NOTE — TELEPHONE ENCOUNTER
Adrain Phalen, MD  [address] [place] [address] Urology [address] Clinical              Tell wife, or patient, culture negative      Pt's wife notified

## 2018-11-30 ENCOUNTER — CLINICAL SUPPORT (OUTPATIENT)
Dept: UROLOGY | Facility: MEDICAL CENTER | Age: 73
End: 2018-11-30
Payer: MEDICARE

## 2018-11-30 VITALS
WEIGHT: 178 LBS | DIASTOLIC BLOOD PRESSURE: 68 MMHG | SYSTOLIC BLOOD PRESSURE: 138 MMHG | BODY MASS INDEX: 25.48 KG/M2 | HEART RATE: 78 BPM | HEIGHT: 70 IN

## 2018-11-30 DIAGNOSIS — C67.4 MALIGNANT NEOPLASM OF POSTERIOR WALL OF URINARY BLADDER (HCC): Primary | ICD-10-CM

## 2018-11-30 DIAGNOSIS — R31.29 MICROSCOPIC HEMATURIA: ICD-10-CM

## 2018-11-30 DIAGNOSIS — D09.0 CARCINOMA IN SITU OF BLADDER: Primary | ICD-10-CM

## 2018-11-30 LAB
SL AMB  POCT GLUCOSE, UA: NEGATIVE
SL AMB LEUKOCYTE ESTERASE,UA: ABNORMAL
SL AMB POCT BILIRUBIN,UA: NEGATIVE
SL AMB POCT BLOOD,UA: ABNORMAL
SL AMB POCT CLARITY,UA: CLEAR
SL AMB POCT COLOR,UA: YELLOW
SL AMB POCT KETONES,UA: NEGATIVE
SL AMB POCT NITRITE,UA: NEGATIVE
SL AMB POCT PH,UA: 5
SL AMB POCT SPECIFIC GRAVITY,UA: >=1.03
SL AMB POCT URINE PROTEIN: ABNORMAL
SL AMB POCT UROBILINOGEN: 0.2

## 2018-11-30 PROCEDURE — 81003 URINALYSIS AUTO W/O SCOPE: CPT

## 2018-11-30 PROCEDURE — 51720 TREATMENT OF BLADDER LESION: CPT

## 2018-11-30 NOTE — PROGRESS NOTES
Bladder instillation     Date/Time 11/30/2018 10:43 AM     Performed by  Sasha Klein by Savage Dose       Consent: Written consent obtained  Consent given by: patient  Patient understanding: patient states understanding of the procedure being performed  Patient identity confirmed: verbally with patient      Preparation: Patient was prepped and draped in the usual sterile fashion  Site preparation: Betadine    Local anesthesia used: yes     Anesthesia   Local anesthesia used: yes  Local Anesthetic: topical anesthetic  Anesthetic total: 5 mL     Procedure Details   Procedure Notes: Pt states he did well with last instillation  He had some bladder discomfort for 2 days  His urine culture is no growth  Risks, benefits and some of the potential complications of the procedure were discussed at length with the patient including infection, bleeding, voiding discomfort, urinary retention, fever, chills, sepsis and others  All questions were answered  Informed consent was obtained  Sterile technique and intraurethral analgesia were used  A 14 Icelandic hsieh catheter was gently placed through the urethra and into the patient's bladder  The bladder was emptied of all urine  Urine was collected for a UA  Approximately 50 cc's of BCG was instilled into the bladder via catheter tubing and gravity  The catheter was plugged and clamped  Conforming gauze was wrapped around tip of penis to collect any leakage  The pt turned on stomach, sides, and back for 15 mins each  After 1 hour, his bladder was drained into a urinal and the catheter was gently removed  There was minimal leakage on the gauze  The procedure was tolerated well without complications  The patient was instructed to call the office for bloody urine, difficulty urinating, painful urination, fever, chills, nausea, vomiting  The patient stated he understood these instructions and would comply

## 2018-12-03 ENCOUNTER — TRANSCRIBE ORDERS (OUTPATIENT)
Dept: ADMINISTRATIVE | Facility: HOSPITAL | Age: 73
End: 2018-12-03

## 2018-12-03 ENCOUNTER — APPOINTMENT (OUTPATIENT)
Dept: LAB | Facility: MEDICAL CENTER | Age: 73
End: 2018-12-03
Attending: UROLOGY
Payer: MEDICARE

## 2018-12-03 ENCOUNTER — TELEPHONE (OUTPATIENT)
Dept: UROLOGY | Facility: AMBULATORY SURGERY CENTER | Age: 73
End: 2018-12-03

## 2018-12-03 DIAGNOSIS — R30.0 BURNING WITH URINATION: Primary | ICD-10-CM

## 2018-12-03 PROCEDURE — 87186 SC STD MICRODIL/AGAR DIL: CPT

## 2018-12-03 PROCEDURE — 87077 CULTURE AEROBIC IDENTIFY: CPT

## 2018-12-03 PROCEDURE — 87086 URINE CULTURE/COLONY COUNT: CPT

## 2018-12-03 NOTE — TELEPHONE ENCOUNTER
Called pt's wife, pt is complaining of urinary frequency, q half hour at night, urinary pressure and burning  He will go for a urine culture  BCG instillation cancelled for this Friday

## 2018-12-05 ENCOUNTER — TELEPHONE (OUTPATIENT)
Dept: UROLOGY | Facility: MEDICAL CENTER | Age: 73
End: 2018-12-05

## 2018-12-05 DIAGNOSIS — N30.00 ACUTE CYSTITIS WITHOUT HEMATURIA: Primary | ICD-10-CM

## 2018-12-05 LAB
BACTERIA UR CULT: ABNORMAL
BACTERIA UR CULT: ABNORMAL

## 2018-12-05 RX ORDER — CEPHALEXIN 500 MG/1
500 CAPSULE ORAL EVERY 8 HOURS SCHEDULED
Qty: 21 CAPSULE | Refills: 0 | Status: SHIPPED | OUTPATIENT
Start: 2018-12-05 | End: 2018-12-12

## 2018-12-05 NOTE — TELEPHONE ENCOUNTER
Culture positive, wife notified  Pt passing small blood clots, size of pea or smaller and has dysuria when passing clots, no fever  Pt did see Dr Monson Resides on Wednesday  Would like call to discuss with you  Will direct to Dr Childress Feeling

## 2018-12-05 NOTE — TELEPHONE ENCOUNTER
Per Dr Dawn Heads sent to pharm  Pt to keep 12/14 BCG appt  No re-culture planned at this time  Discussed with wife by Dr Stephanie Hawkins

## 2018-12-05 NOTE — TELEPHONE ENCOUNTER
Pt wife calling- stating pt has blood clots and would like to know results to UC done 12/3, please advise and call back    [phone]

## 2018-12-11 RX ORDER — MIRABEGRON 25 MG/1
25 TABLET, FILM COATED, EXTENDED RELEASE ORAL DAILY
Refills: 1 | COMMUNITY
Start: 2018-12-04 | End: 2020-02-25

## 2018-12-14 ENCOUNTER — CLINICAL SUPPORT (OUTPATIENT)
Dept: UROLOGY | Facility: MEDICAL CENTER | Age: 73
End: 2018-12-14
Payer: MEDICARE

## 2018-12-14 VITALS
WEIGHT: 179 LBS | HEIGHT: 70 IN | DIASTOLIC BLOOD PRESSURE: 76 MMHG | SYSTOLIC BLOOD PRESSURE: 138 MMHG | BODY MASS INDEX: 25.62 KG/M2 | HEART RATE: 58 BPM

## 2018-12-14 DIAGNOSIS — C67.4 MALIGNANT NEOPLASM OF POSTERIOR WALL OF URINARY BLADDER (HCC): Primary | ICD-10-CM

## 2018-12-14 LAB
SL AMB  POCT GLUCOSE, UA: NEGATIVE
SL AMB LEUKOCYTE ESTERASE,UA: ABNORMAL
SL AMB POCT BILIRUBIN,UA: NEGATIVE
SL AMB POCT BLOOD,UA: ABNORMAL
SL AMB POCT CLARITY,UA: CLEAR
SL AMB POCT COLOR,UA: YELLOW
SL AMB POCT KETONES,UA: NEGATIVE
SL AMB POCT NITRITE,UA: NEGATIVE
SL AMB POCT PH,UA: 6
SL AMB POCT SPECIFIC GRAVITY,UA: >=1.03
SL AMB POCT URINE PROTEIN: ABNORMAL
SL AMB POCT UROBILINOGEN: 0.2

## 2018-12-14 PROCEDURE — 51720 TREATMENT OF BLADDER LESION: CPT

## 2018-12-14 PROCEDURE — 81003 URINALYSIS AUTO W/O SCOPE: CPT

## 2018-12-14 NOTE — PROGRESS NOTES
Bladder instillation     Date/Time 12/14/2018 1:41 PM     Performed by  Junie New by Rajinder Ford       Consent: Verbal consent obtained  Consent given by: patient  Patient understanding: patient states understanding of the procedure being performed  Patient identity confirmed: verbally with patient      Preparation: Patient was prepped and draped in the usual sterile fashion  Site preparation: Betadine    Local anesthesia used: yes     Anesthesia   Local anesthesia used: yes  Local Anesthetic: topical anesthetic  Anesthetic total: 5 mL     Procedure Details   Procedure Notes: Pt did well with his last BCG instillation  He did have some bladder discomfort, denies fever  He noticed a blood clot in his urine this am   UA dip is + small blood, +small leukocytes  He finished ABX yesterday  Per Dr Dena Birmingham to have instillation today  Sterile technique and intraurethral analgesia were used  A 14 Dutch hsieh catheter was gently placed through the urethra and into the patient's bladder  6cc of sterile water was inflated into balloon  The bladder was emptied of all urine  Approximately 50 cc's of BCG was instilled into the bladder via catheter tubing and gravity  The catheter was capped and clamped  Conforming gauze was wrapped around catheter and penis  The pt remained in the office for 1 hour, rotating from stomach, to L side, to R side, and to his back every 15 mins  He did leak slightly around the catheter, but leakage was contained in the gauze  The catheter was uncapped and unclamped and, bladder contents were drained into a urinal and the catheter was gently removed  The procedure was tolerated well without complications The patient was instructed to call the office for bloody urine, difficulty urinating, painful urination, fever, chills, nausea, vomiting  The patient stated he understood these instructions and would comply

## 2018-12-20 ENCOUNTER — TELEPHONE (OUTPATIENT)
Dept: UROLOGY | Facility: MEDICAL CENTER | Age: 73
End: 2018-12-20

## 2018-12-20 NOTE — TELEPHONE ENCOUNTER
Per Dr Stephanie Hawkins pt to stop treatments and make f/u at [facility]  Will leave 1/23 cysto appt with Dr Stephanie Hawkins for now, pt may cancel and receive care at [facility]  Wife notified   BCG appt cancelled

## 2018-12-20 NOTE — TELEPHONE ENCOUNTER
Pt reports after BCG treatments he's been having increased burning and discomfort that's lasting for days  Yesterday started passing clots, clots were red yesterday today brownish color, urine itself seems clear  Also experiencing nocturia q 1- 1 1/2 hours  Has burning in penis which gets him up, is incontinent and then burning resolves  Not getting any sleep  Denies fever  Pt doesn't know if he wants to continue with treatments  Will direct to Dr Luan Guadalupe  BCG # 4 of 6 schedule for tomorrow

## 2019-01-25 ENCOUNTER — OFFICE VISIT (OUTPATIENT)
Dept: CARDIOLOGY CLINIC | Facility: CLINIC | Age: 74
End: 2019-01-25
Payer: MEDICARE

## 2019-01-25 VITALS
DIASTOLIC BLOOD PRESSURE: 74 MMHG | WEIGHT: 181.2 LBS | SYSTOLIC BLOOD PRESSURE: 138 MMHG | HEART RATE: 68 BPM | HEIGHT: 70 IN | BODY MASS INDEX: 25.94 KG/M2

## 2019-01-25 DIAGNOSIS — I45.2 BIFASCICULAR BUNDLE BRANCH BLOCK: ICD-10-CM

## 2019-01-25 DIAGNOSIS — I25.118 CORONARY ARTERY DISEASE OF NATIVE ARTERY OF NATIVE HEART WITH STABLE ANGINA PECTORIS (HCC): Primary | Chronic | ICD-10-CM

## 2019-01-25 DIAGNOSIS — I10 ESSENTIAL HYPERTENSION: ICD-10-CM

## 2019-01-25 DIAGNOSIS — I34.0 NON-RHEUMATIC MITRAL REGURGITATION: ICD-10-CM

## 2019-01-25 DIAGNOSIS — E78.2 MIXED HYPERLIPIDEMIA: Chronic | ICD-10-CM

## 2019-01-25 PROCEDURE — 99214 OFFICE O/P EST MOD 30 MIN: CPT | Performed by: INTERNAL MEDICINE

## 2019-01-25 RX ORDER — ISOSORBIDE MONONITRATE 30 MG/1
30 TABLET, EXTENDED RELEASE ORAL DAILY
Qty: 90 TABLET | Refills: 3 | Status: SHIPPED | OUTPATIENT
Start: 2019-01-25 | End: 2020-02-25 | Stop reason: SDUPTHER

## 2019-01-25 RX ORDER — ATORVASTATIN CALCIUM 20 MG/1
20 TABLET, FILM COATED ORAL DAILY
Qty: 90 TABLET | Refills: 3 | Status: SHIPPED | OUTPATIENT
Start: 2019-01-25 | End: 2020-02-25 | Stop reason: SDUPTHER

## 2019-01-25 RX ORDER — RANOLAZINE 500 MG/1
500 TABLET, EXTENDED RELEASE ORAL 2 TIMES DAILY
Qty: 180 TABLET | Refills: 3 | Status: SHIPPED | OUTPATIENT
Start: 2019-01-25 | End: 2020-02-25 | Stop reason: SDUPTHER

## 2019-01-25 RX ORDER — METOPROLOL TARTRATE 50 MG/1
50 TABLET, FILM COATED ORAL EVERY 12 HOURS SCHEDULED
Qty: 180 TABLET | Refills: 3 | Status: SHIPPED | OUTPATIENT
Start: 2019-01-25 | End: 2019-08-06 | Stop reason: SDUPTHER

## 2019-01-25 NOTE — PROGRESS NOTES
Cardiology Follow Up    Zain Magana  1945  [id number]  [address]  [address] [address] [phone]    Reason for visit: 6 month FU for chronic angina, CAD s/p remote coronary artery bypass grafting with recent catheterization last year  showing severe native circumflex disease not amenable to intervention, hypertension, hyperlipidemia, right bundle branch block/AFB  and moderate mitral regurgitation    1  Coronary artery disease of native artery of native heart with stable angina pectoris (HCC)  metoprolol tartrate (LOPRESSOR) 50 mg tablet    ranolazine (RANEXA) 500 mg 12 hr tablet    isosorbide mononitrate (IMDUR) 30 mg 24 hr tablet   2  Essential hypertension     3  Non-rheumatic mitral regurgitation     4  Mixed hyperlipidemia  atorvastatin (LIPITOR) 20 mg tablet   5  Bifascicular bundle branch block         Interval History: Since his last visit he can get angina with overexertion  He denies resting or prolonged episodes    He denies SOB  Lajean Cassette He is fatigued    This is unchanged  He denies lightheadedness or palpitations       Patient Active Problem List   Diagnosis    Leukocytosis    Chronic anemia    Chronic stable angina (HCC)    CAD (coronary artery disease)    Hyperlipidemia    Bifascicular bundle branch block    Trans[facility] cell bladder cancer [facility]    NSTEMI (non-ST elevated myocardial infarction) ([facility] )    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    Essential hypertension    Chronic kidney disease (CKD) stage G3a/A1, moderately decreased glomerular filtration rate (GFR) between 45-59 mL/min/1 73 square meter and albuminuria creatinine ratio less than 30 mg/g (HCC)    Stress incontinence of urine    Solitary kidney, acquired    Non-rheumatic mitral regurgitation    Intraepithelial carcinoma    Acute cystitis without hematuria    CIS (carcinoma in situ of bladder)    Bladder mass     Past Medical History:   Diagnosis Date    Anesthesia     "can't urinate after surgery"    Basal cell carcinoma     left arm/ right eyelid/ right hand/ above right eyebrow in past    Bladder mass     Cataract     rosalind    Coronary artery disease     CABG X 2 2004    Essential tremor     of head and hands bilat    Hearing aid worn     bilat    History of kidney stones     History of pneumonia     childhood    History of prostate cancer     History of transfusion     2017    Hx of radiation therapy     2007 for after prostate surgery    Hyperlipidemia     Hypertension     Kidney stone     in past    Myocardial infarction ([facility] )     Prostate CA ([facility] )     2002- had prostatectomy    Scab     black scab right thumb area- biopsy taken by md recently- bandaid over area    Transitional cell bladder cancer ([facility] )     had BCG    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 ([facility] )     and" left kidney and left ureter removed" 2017- had chemo    Urinary incontinence     wears Depends    Urinary incontinence     Wears glasses     Wears glasses     Wears partial dentures     lower     Social History     Social History    Marital status: /Civil Union     Spouse name: N/A    Number of children: N/A    Years of education: N/A     Occupational History    Not on file  Social History Main Topics    Smoking status: Never Smoker    Smokeless tobacco: Never Used    Alcohol use Yes      Comment: few x year    Drug use: No    Sexual activity: Not on file     Other Topics Concern    Not on file     Social History Narrative    No narrative on file      No family history on file    Past Surgical History:   Procedure Laterality Date    APPENDECTOMY      BACK SURGERY      lumbar herniated disc repair    CARDIAC CATHETERIZATION      COLONOSCOPY      CORONARY ARTERY BYPASS GRAFT      x2 in 2004   [address] N/A 7/3/2017    Procedure: BLADDER BIOPSY;  Surgeon: Doretha Regalado MD;  [facility];  Service: Urology    CYSTOSCOPY W/ RETROGRADES Right 7/3/2017    Procedure: CYSTOSCOPY WITH RETROGRADE PYELOGRAM;  Surgeon: Mabelene Kocher, MD;  [facility];  Service: Urology    HERNIA REPAIR      groin    NEPHRECTOMY Left     and left ureter    MA CYSTOURETHROSCOPY,FULGUR <0 5 CM LESN Right 10/23/2018    Procedure: Marii Orr, TURBT;  Surgeon: Mabelene Kocher, MD;  [facility];  Service: Urology    MA INSTILL ANTICANCER AGENT IN BLADDER N/A 10/23/2018    Procedure: De Luna Mode;  Surgeon: Mabelene Kocher, MD;  [facility];  Service: Urology   [address] Left     SHOULDER SURGERY      dislocation    SKIN CANCER EXCISION  2011    left arm and right eyebrow       Current Outpatient Prescriptions:     aspirin 81 mg chewable tablet, Chew 81 mg daily Will stop 10/16 , Disp: , Rfl:     atorvastatin (LIPITOR) 20 mg tablet, Take 1 tablet (20 mg total) by mouth daily, Disp: 90 tablet, Rfl: 3    Calcium Carbonate-Vitamin D3 600-400 MG-UNIT TABS, Take 1,000 Units by mouth daily, Disp: , Rfl:     Cyanocobalamin (B-12) 1000 MCG CAPS, Take by mouth daily, Disp: , Rfl:     isosorbide mononitrate (IMDUR) 30 mg 24 hr tablet, Take 1 tablet (30 mg total) by mouth daily, Disp: 90 tablet, Rfl: 3    metoprolol tartrate (LOPRESSOR) 50 mg tablet, Take 1 tablet (50 mg total) by mouth every 12 (twelve) hours, Disp: 180 tablet, Rfl: 3    Multiple Vitamin (MULTIVITAMIN) tablet, Take 1 tablet by mouth daily, Disp: , Rfl:     nitroglycerin (NITROSTAT) 0 4 mg SL tablet, Place 1 tablet (0 4 mg total) under the tongue every 5 (five) minutes as needed for chest pain, Disp: 25 tablet, Rfl: 5    ondansetron (ZOFRAN-ODT) 8 mg disintegrating tablet, Take 8 mg by mouth every 8 (eight) hours as needed for nausea or vomiting, Disp: , Rfl:     ranolazine (RANEXA) 500 mg 12 hr tablet, Take 1 tablet (500 mg total) by mouth 2 (two) times a day, Disp: 180 tablet, Rfl: 3    FLUAD 0 5 ML JAX, inject 0 5 milliliter intramuscularly, Disp: , Rfl: 0    furosemide (LASIX) 20 mg tablet, Take 20 mg by mouth daily as needed, Disp: , Rfl:     MYRBETRIQ 25 MG TB24, Take 25 mg by mouth daily, Disp: , Rfl: 1    prochlorperazine (COMPAZINE) 10 mg tablet, Take 10 mg by mouth every 6 (six) hours as needed for nausea or vomiting, Disp: , Rfl:   Allergies   Allergen Reactions    Levaquin [Levofloxacin] Rash    Percocet [Oxycodone-Acetaminophen] GI Intolerance     Constipation,,,happens with narcotics like percocet    Lovastatin Other (See Comments)     myopathy       Review of Systems:  Review of Systems   Constitutional: Positive for fatigue  Negative for activity change, appetite change and unexpected weight change  Respiratory: Negative for cough, chest tightness, shortness of breath and wheezing  Cardiovascular: Positive for chest pain  Negative for palpitations and leg swelling  Gastrointestinal: Negative for abdominal pain, blood in stool, constipation and diarrhea  Genitourinary: Positive for hematuria  Negative for dysuria, frequency and urgency  Incontinent   Musculoskeletal: Positive for back pain  Negative for arthralgias, gait problem and joint swelling  Neurological: Negative for dizziness, speech difficulty, light-headedness and headaches  Psychiatric/Behavioral: Negative for agitation, behavioral problems, confusion and decreased concentration  Physical Exam:  Vitals:    01/25/19 1018   BP: 138/74   BP Location: Right arm   Patient Position: Sitting   Cuff Size: Adult   Pulse: 68   Weight: 82 2 kg (181 lb 3 2 oz)   Height: 5' 10" (1 778 m)       Physical Exam   Constitutional: He is oriented to person, place, and time  He appears well-developed and well-nourished  No distress  HENT:   Head: Normocephalic and atraumatic  Mouth/Throat: No oropharyngeal exudate  Eyes: Conjunctivae are normal  No scleral icterus  Neck: Neck supple   Normal carotid pulses and no JVD present  Carotid bruit is not present  No thyromegaly present  Cardiovascular: Normal rate and regular rhythm  Exam reveals no gallop and no friction rub  Murmur (grade 1 apical systolic murmur) heard  Pulses:       Posterior tibial pulses are 1+ on the right side, and 1+ on the left side  Pulmonary/Chest: He has decreased breath sounds  He has no wheezes  He has no rhonchi  He has no rales  Abdominal: Soft  He exhibits no mass  There is no hepatosplenomegaly  There is no tenderness  Musculoskeletal: He exhibits deformity (kyphosis)  He exhibits no edema or tenderness  Neurological: He is alert and oriented to person, place, and time  He has normal strength  No cranial nerve deficit or sensory deficit  Skin: Skin is warm and dry  No rash noted  No erythema  No pallor  Psychiatric: He has a normal mood and affect  His behavior is normal  Judgment and thought content normal        Discussion/Summary:  1  CAD s/p remote CABG with residual native cx disease    Stable angina    Much easier to control with meds since Hb ok    Continue metoprolol, ranolazine and nitrates  2  HTN    Well controlled on metoprolol and nitrates  3  MR-moderate    Not problematic  4  Mixed HLP-on atorvastatin 20 mg daily  Gerhardt Sep LDL 87  Will not advance due to very mildly elevated AST, ALT  Gerhardt Sep Reasonable control  5  Bifascicular BBB-no evidence for bradycardia rhythm disturbances  Susan Marte of possible sx which could indicate this        FU 6 months      Estuardo Lopes MD

## 2019-03-14 NOTE — PROGRESS NOTES
Rounded with SLIM Scheduled procedure with Patient at Other: FACE TO FACE   Scheduled Via: Case Creation for [facility]  Procedure date: 4.17.19  Procedure time 12:30 PM  Case [id number]  [facility] for all cases   Insurance confirmed as [facility], will be the same at time of procedure: Yes  ALWAYS SEND A MESSAGE TO PRE AUTH IF WORK COMP  The following have been confirmed:  Procedure Shoulder Scope w/Rotator Cuff [id number] Left     PT yes  [place]    Shoulder scope PT 5 days post surgery.   Rotator cuff 1-2 visits within the first 6 weeks    Pre-op with PCP YES         Post-op Ortho 5.3.19 2 weeks for any surgery unless otherwise noted    [facility] will say no  BMI Estimated body mass index is 34.48 kg/m² as calculated from the following:    Height as of 3/5/19: 5' 10\" (1.778 m).    Weight as of 3/5/19: 109 kg.  Message to Benefits/Authorization yes    No ASA 5 days yes  Letter to patient yes  mpirik no  Hibiclens handouts given in the office  Please send out hibiclens and instructional sheet on everyone but carpal tunnel and manipulations given in the office

## 2019-08-05 ENCOUNTER — APPOINTMENT (OUTPATIENT)
Dept: LAB | Facility: CLINIC | Age: 74
End: 2019-08-05
Payer: MEDICARE

## 2019-08-05 DIAGNOSIS — Z90.5 SOLITARY KIDNEY, ACQUIRED: ICD-10-CM

## 2019-08-05 DIAGNOSIS — N18.30 CKD (CHRONIC KIDNEY DISEASE) STAGE 3, GFR 30-59 ML/MIN (HCC): ICD-10-CM

## 2019-08-05 LAB
ALBUMIN SERPL BCP-MCNC: 3.8 G/DL (ref 3.5–5)
ANION GAP SERPL CALCULATED.3IONS-SCNC: 4 MMOL/L (ref 4–13)
BUN SERPL-MCNC: 20 MG/DL (ref 5–25)
CALCIUM SERPL-MCNC: 9 MG/DL (ref 8.3–10.1)
CHLORIDE SERPL-SCNC: 103 MMOL/L (ref 100–108)
CO2 SERPL-SCNC: 28 MMOL/L (ref 21–32)
CREAT SERPL-MCNC: 1.16 MG/DL (ref 0.6–1.3)
ERYTHROCYTE [DISTWIDTH] IN BLOOD BY AUTOMATED COUNT: 15.1 % (ref 11.6–15.1)
GFR SERPL CREATININE-BSD FRML MDRD: 61 ML/MIN/1.73SQ M
GLUCOSE SERPL-MCNC: 153 MG/DL (ref 65–140)
HCT VFR BLD AUTO: 43.2 % (ref 36.5–49.3)
HGB BLD-MCNC: 13.7 G/DL (ref 12–17)
MCH RBC QN AUTO: 31.3 PG (ref 26.8–34.3)
MCHC RBC AUTO-ENTMCNC: 31.7 G/DL (ref 31.4–37.4)
MCV RBC AUTO: 99 FL (ref 82–98)
PHOSPHATE SERPL-MCNC: 2.3 MG/DL (ref 2.3–4.1)
PLATELET # BLD AUTO: 157 THOUSANDS/UL (ref 149–390)
PMV BLD AUTO: 11 FL (ref 8.9–12.7)
POTASSIUM SERPL-SCNC: 4.4 MMOL/L (ref 3.5–5.3)
PTH-INTACT SERPL-MCNC: 60.3 PG/ML (ref 18.4–80.1)
RBC # BLD AUTO: 4.38 MILLION/UL (ref 3.88–5.62)
SODIUM SERPL-SCNC: 135 MMOL/L (ref 136–145)
URATE SERPL-MCNC: 7.7 MG/DL (ref 4.2–8)
WBC # BLD AUTO: 5.27 THOUSAND/UL (ref 4.31–10.16)

## 2019-08-05 PROCEDURE — 85027 COMPLETE CBC AUTOMATED: CPT

## 2019-08-05 PROCEDURE — 36415 COLL VENOUS BLD VENIPUNCTURE: CPT

## 2019-08-05 PROCEDURE — 83970 ASSAY OF PARATHORMONE: CPT

## 2019-08-05 PROCEDURE — 84550 ASSAY OF BLOOD/URIC ACID: CPT

## 2019-08-05 PROCEDURE — 80069 RENAL FUNCTION PANEL: CPT

## 2019-08-06 ENCOUNTER — APPOINTMENT (OUTPATIENT)
Dept: LAB | Facility: CLINIC | Age: 74
End: 2019-08-06
Payer: MEDICARE

## 2019-08-06 ENCOUNTER — OFFICE VISIT (OUTPATIENT)
Dept: CARDIOLOGY CLINIC | Facility: CLINIC | Age: 74
End: 2019-08-06
Payer: MEDICARE

## 2019-08-06 VITALS
DIASTOLIC BLOOD PRESSURE: 70 MMHG | WEIGHT: 180 LBS | OXYGEN SATURATION: 94 % | SYSTOLIC BLOOD PRESSURE: 110 MMHG | HEIGHT: 70 IN | BODY MASS INDEX: 25.77 KG/M2 | HEART RATE: 65 BPM

## 2019-08-06 DIAGNOSIS — I45.2 BIFASCICULAR BUNDLE BRANCH BLOCK: ICD-10-CM

## 2019-08-06 DIAGNOSIS — E78.2 MIXED HYPERLIPIDEMIA: Chronic | ICD-10-CM

## 2019-08-06 DIAGNOSIS — I25.118 CORONARY ARTERY DISEASE OF NATIVE ARTERY OF NATIVE HEART WITH STABLE ANGINA PECTORIS (HCC): Chronic | ICD-10-CM

## 2019-08-06 DIAGNOSIS — I34.0 NON-RHEUMATIC MITRAL REGURGITATION: ICD-10-CM

## 2019-08-06 DIAGNOSIS — I10 ESSENTIAL HYPERTENSION: Primary | ICD-10-CM

## 2019-08-06 LAB
CREAT UR-MCNC: 92.5 MG/DL
PROT UR-MCNC: 21 MG/DL
PROT/CREAT UR: 0.23 MG/G{CREAT} (ref 0–0.1)

## 2019-08-06 PROCEDURE — 99214 OFFICE O/P EST MOD 30 MIN: CPT | Performed by: INTERNAL MEDICINE

## 2019-08-06 PROCEDURE — 93000 ELECTROCARDIOGRAM COMPLETE: CPT | Performed by: INTERNAL MEDICINE

## 2019-08-06 PROCEDURE — 82570 ASSAY OF URINE CREATININE: CPT

## 2019-08-06 PROCEDURE — 84156 ASSAY OF PROTEIN URINE: CPT

## 2019-08-06 RX ORDER — METOPROLOL TARTRATE 50 MG/1
25 TABLET, FILM COATED ORAL EVERY 12 HOURS SCHEDULED
Qty: 180 TABLET | Refills: 3
Start: 2019-08-06 | End: 2019-11-05 | Stop reason: SDUPTHER

## 2019-08-06 NOTE — PROGRESS NOTES
Cardiology Follow Up    Davene Arch  1945  [id number]  [address]  [address]  [facility] [address] [phone]    Reason for visit:  6 month FU for chronic angina, CAD s/p remote coronary artery bypass grafting with recent catheterization last year  showing severe native circumflex disease not amenable to intervention, hypertension, hyperlipidemia, right bundle branch block/LAFB  and moderate mitral regurgitation       1  Essential hypertension  POCT ECG   2  Coronary artery disease of native artery of native heart with stable angina pectoris ([facility] )     3  Mixed hyperlipidemia     4  Bifascicular bundle branch block     5  Non-rheumatic mitral regurgitation         Interval History:  Since the patient's last visit, he does get some angina with over exertion  This is fairly infrequent  He does have ongoing fatigue  He denies shortness of breath with effort  He denies lightheadedness or palpitations  He denies peripheral edema        Patient Active Problem List   Diagnosis    Leukocytosis    Chronic anemia    Chronic stable angina (HCC)    CAD (coronary artery disease)    Hyperlipidemia    Bifascicular bundle branch block    Transitional cell bladder cancer ([facility] )    NSTEMI (non-ST elevated myocardial infarction) ([facility] )    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    Essential hypertension    Chronic kidney disease (CKD) stage G3a/A1, moderately decreased glomerular filtration rate (GFR) between 45-59 mL/min/1 73 square meter and albuminuria creatinine ratio less than 30 mg/g ([facility])    Stress incontinence of urine    Solitary kidney, acquired    Non-rheumatic mitral regurgitation    Intraepithelial carcinoma    Acute cystitis without hematuria    CIS (carcinoma in situ of bladder)    Bladder mass     Past Medical History:   Diagnosis Date    Anesthesia     "can't urinate after surgery"    Basal cell carcinoma left arm/ right eyelid/ right hand/ above right eyebrow in past    Bladder mass     Cataract     rosalind    Coronary artery disease     CABG X 2 2004    Essential tremor     of head and hands bilat    Hearing aid worn     bilat    History of kidney stones     History of pneumonia     childhood    History of prostate cancer     History of transfusion     2017    Hx of radiation therapy     2007 for after prostate surgery    Hyperlipidemia     Hypertension     Kidney stone     in past    Myocardial infarction ([facility] )     Prostate CA [facility] )     2002- had prostatectomy    Scab     black scab right thumb area- biopsy taken by md recently- bandaid over area    Transitional cell bladder cancer ([facility] )     had BCG    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)     and" left kidney and left ureter removed" 2017- had chemo    Urinary incontinence     wears Depends    Urinary incontinence     Wears glasses     Wears glasses     Wears partial dentures     lower     Social History     Socioeconomic History    Marital status: /Civil Union     Spouse name: Not on file    Number of children: Not on file    Years of education: Not on file    Highest education level: Not on file   Occupational History    Not on file   Social Needs    Financial resource strain: Not on file    Food insecurity:     Worry: Not on file     Inability: Not on file    Transportation needs:     Medical: Not on file     Non-medical: Not on file   Tobacco Use    Smoking status: Never Smoker    Smokeless tobacco: Never Used   Substance and Sexual Activity    Alcohol use: Yes     Comment: few x year    Drug use: No    Sexual activity: Not on file   Lifestyle    Physical activity:     Days per week: Not on file     Minutes per session: Not on file    Stress: Not on file   Relationships    Social connections:     Talks on phone: Not on file     Gets together: Not on file     Attends Shinto service: Not on file Active member of club or organization: Not on file     Attends meetings of clubs or organizations: Not on file     Relationship status: Not on file    Intimate partner violence:     Fear of current or ex partner: Not on file     Emotionally abused: Not on file     Physically abused: Not on file     Forced sexual activity: Not on file   Other Topics Concern    Not on file   Social History Narrative    Not on file      History reviewed  No pertinent family history    Past Surgical History:   Procedure Laterality Date    APPENDECTOMY      BACK SURGERY      lumbar herniated disc repair    CARDIAC CATHETERIZATION      COLONOSCOPY      CORONARY ARTERY BYPASS GRAFT      x2 in 2004   [address] N/A 7/3/2017    Procedure: BLADDER BIOPSY;  Surgeon: Vadim Ding MD;  [facility];  Service: Urology    CYSTOSCOPY W/ RETROGRADES Right 7/3/2017    Procedure: CYSTOSCOPY WITH RETROGRADE PYELOGRAM;  Surgeon: Vadim Ding MD;  [facility];  Service: Urology    HERNIA REPAIR      groin    NEPHRECTOMY Left     and left ureter    MI CYSTOURETHROSCOPY,FULGUR <0 5 CM LESN Right 10/23/2018    Procedure: Jamey Austin, TURBT;  Surgeon: Vadim Ding MD;  [facility];  Service: Urology    MI INSTILL ANTICANCER AGENT IN BLADDER N/A 10/23/2018    Procedure: INSTILLATION MITOMYCIN;  Surgeon: Vadim Ding MD;  [facility];  Service: Urology   [address] [place] [address] Left     SHOULDER SURGERY      dislocation    SKIN CANCER EXCISION  2011    left arm and right eyebrow       Current Outpatient Medications:     aspirin 81 mg chewable tablet, Chew 81 mg daily Will stop 10/16 , Disp: , Rfl:     atorvastatin (LIPITOR) 20 mg tablet, Take 1 tablet (20 mg total) by mouth daily, Disp: 90 tablet, Rfl: 3    Cyanocobalamin (B-12) 1000 MCG CAPS, Take by mouth daily, Disp: , Rfl:     furosemide (LASIX) 20 mg tablet, Take 20 mg by mouth as needed , Disp: , Rfl:    isosorbide mononitrate (IMDUR) 30 mg 24 hr tablet, Take 1 tablet (30 mg total) by mouth daily, Disp: 90 tablet, Rfl: 3    metoprolol tartrate (LOPRESSOR) 50 mg tablet, Take 1 tablet (50 mg total) by mouth every 12 (twelve) hours, Disp: 180 tablet, Rfl: 3    Multiple Vitamin (MULTIVITAMIN) tablet, Take 1 tablet by mouth daily, Disp: , Rfl:     ranolazine (RANEXA) 500 mg 12 hr tablet, Take 1 tablet (500 mg total) by mouth 2 (two) times a day, Disp: 180 tablet, Rfl: 3    Calcium Carbonate-Vitamin D3 600-400 MG-UNIT TABS, Take 1,000 Units by mouth daily, Disp: , Rfl:     FLUAD 0 5 ML JAX, inject 0 5 milliliter intramuscularly, Disp: , Rfl: 0    MYRBETRIQ 25 MG TB24, Take 25 mg by mouth daily, Disp: , Rfl: 1    nitroglycerin (NITROSTAT) 0 4 mg SL tablet, Place 1 tablet (0 4 mg total) under the tongue every 5 (five) minutes as needed for chest pain (Patient not taking: Reported on 8/6/2019), Disp: 25 tablet, Rfl: 5    ondansetron (ZOFRAN-ODT) 8 mg disintegrating tablet, Take 8 mg by mouth every 8 (eight) hours as needed for nausea or vomiting, Disp: , Rfl:     prochlorperazine (COMPAZINE) 10 mg tablet, Take 10 mg by mouth every 6 (six) hours as needed for nausea or vomiting, Disp: , Rfl:   Allergies   Allergen Reactions    Levaquin [Levofloxacin] Rash    Percocet [Oxycodone-Acetaminophen] GI Intolerance     Constipation,,,happens with narcotics like percocet    Lovastatin Other (See Comments)     myopathy       Review of Systems:  Review of Systems   Constitutional: Positive for fatigue  Negative for activity change, appetite change and unexpected weight change  Respiratory: Negative for cough, chest tightness, shortness of breath and wheezing  Cardiovascular: Positive for chest pain  Negative for palpitations and leg swelling  Gastrointestinal: Negative for abdominal pain, blood in stool, constipation and diarrhea  Genitourinary: Positive for frequency  Negative for dysuria, hematuria and urgency  Musculoskeletal: Positive for arthralgias and back pain  Negative for gait problem and joint swelling  Neurological: Negative for dizziness, syncope, speech difficulty, light-headedness and headaches  Psychiatric/Behavioral: Negative for agitation, behavioral problems, confusion and decreased concentration  Physical Exam:  Vitals:    08/06/19 1417   BP: 110/70   BP Location: Right arm   Patient Position: Sitting   Cuff Size: Large   Pulse: 65   SpO2: 94%   Weight: 81 6 kg (180 lb)   Height: 5' 10" (1 778 m)       Physical Exam   Constitutional: He is oriented to person, place, and time  He appears well-developed and well-nourished  No distress  HENT:   Head: Normocephalic and atraumatic  Mouth/Throat: Oropharynx is clear and moist  No oropharyngeal exudate  Eyes: Conjunctivae are normal  No scleral icterus  Neck: Neck supple  Normal carotid pulses and no JVD present  Carotid bruit is not present  No thyromegaly present  Cardiovascular: Normal rate and regular rhythm  Exam reveals no gallop and no friction rub  Murmur heard  Crescendo decrescendo systolic (apical) murmur is present with a grade of 1/6  No diastolic murmur is present  Pulses:       Posterior tibial pulses are 1+ on the right side, and 1+ on the left side  Pulmonary/Chest: Breath sounds normal  He has no wheezes  He has no rales  Abdominal: Soft  He exhibits no mass  There is no hepatosplenomegaly  There is no tenderness  Musculoskeletal: He exhibits edema (trace in LEs) and deformity (kyphosis)  He exhibits no tenderness  Neurological: He is alert and oriented to person, place, and time  He has normal strength  No cranial nerve deficit or sensory deficit  Skin: Skin is warm and dry  No rash noted  No erythema  No pallor  Psychiatric: He has a normal mood and affect  His behavior is normal  Judgment and thought content normal        Discussion/Summary:  1  CAD status post remote coronary artery bypass grafting  Patient had 2 of 2 grafts open with severe native disease on cardiac catheterization last year  He does have a stable anginal syndrome  This is well controlled with isosorbide, metoprolol and ranolazine  He is experiencing fatigue and I will reduce metoprolol to 25 mg q 12 hours  If he should get more angina we certainly can increase this  2  Essential hypertension  Blood pressure well controlled on metoprolol and isosorbide  Continue same with changes and metoprolol as noted above  3  Mixed hyperlipidemia  Has not had lipid panel in a while  Will repeat fasting lipid profile with SGOT and fasting BMP in 3 months time  4  Bifascicular bundle branch block  No evidence for severe bradycardia arrhythmias or symptoms to suggest that  5  Moderate mitral regurgitation    Does not appear to be problematic at this time

## 2019-08-28 ENCOUNTER — OFFICE VISIT (OUTPATIENT)
Dept: NEPHROLOGY | Facility: CLINIC | Age: 74
End: 2019-08-28
Payer: MEDICARE

## 2019-08-28 VITALS
BODY MASS INDEX: 25.2 KG/M2 | SYSTOLIC BLOOD PRESSURE: 128 MMHG | HEIGHT: 70 IN | HEART RATE: 66 BPM | WEIGHT: 176 LBS | DIASTOLIC BLOOD PRESSURE: 70 MMHG

## 2019-08-28 DIAGNOSIS — N18.30 STAGE 3 CHRONIC KIDNEY DISEASE (HCC): Primary | ICD-10-CM

## 2019-08-28 DIAGNOSIS — Z90.5 SOLITARY KIDNEY, ACQUIRED: ICD-10-CM

## 2019-08-28 PROCEDURE — 1124F ACP DISCUSS-NO DSCNMKR DOCD: CPT | Performed by: INTERNAL MEDICINE

## 2019-08-28 PROCEDURE — 99213 OFFICE O/P EST LOW 20 MIN: CPT | Performed by: INTERNAL MEDICINE

## 2019-08-28 NOTE — LETTER
August 28, 2019     MD Rupesh Melgoza   [address]  [address] [facility]    Patient: Claudio Peterson   YOB: 1945   Date of Visit: 8/28/2019       Dear Dr Delon Hardy: Thank you for referring Chele Parra to me for evaluation  Below are the relevant portions of my assessment and plan of care  If you have questions, please do not hesitate to call me  I look forward to following Jessica Dempsey along with you  Sincerely,        Anna Max,         CC: No Recipients                     ASSESSMENT and PLAN:  1  Chronic kidney disease, stage III, baseline creatinine 1 3-1 5, most recent creatinine below baseline at 1 1 with an estimated GFR of 61 protein creatinine ratio just slightly abnormal 0 2  2  Solitary kidney function status post nephrectomy  3   History of bladder cancer, following closely with Urology, q 3 months cystoscopy    · Overall renal function remains quite stable, creatinine below baseline  · Blood pressure under good control  · Volume status stable, continue with as needed Lasix  · Follow-up in 1 year with repeat labs at that time

## 2019-08-28 NOTE — PATIENT INSTRUCTIONS
ASSESSMENT and PLAN:  1  Chronic kidney disease, stage III, baseline creatinine 1 3-1 5, most recent creatinine below baseline at 1 1 with an estimated GFR of 61 protein creatinine ratio just slightly abnormal 0 2  2  Solitary kidney function status post nephrectomy  3   History of bladder cancer, following closely with Urology, q 3 months cystoscopy    · Overall renal function remains quite stable, creatinine below baseline  · Blood pressure under good control  · Volume status stable, continue with as needed Lasix  · Follow-up in 1 year with repeat labs at that time

## 2019-08-28 NOTE — PROGRESS NOTES
NEPHROLOGY OUTPATIENT PROGRESS NOTE   Genia Taylor 66 y o  male MRN: [id number]  Reason for visit:  Chronic kidney disease    ASSESSMENT and PLAN:  1  Chronic kidney disease, stage III, baseline creatinine 1 3-1 5, most recent creatinine below baseline at 1 1 with an estimated GFR of 61 protein creatinine ratio just slightly abnormal 0 2  2  Solitary kidney function status post nephrectomy  3  History of bladder cancer, following closely with Urology, q 3 months cystoscopy    · Overall renal function remains quite stable, creatinine below baseline  · Blood pressure under good control  · Volume status stable, continue with as needed Lasix  · Follow-up in 1 year with repeat labs at that time  SUBJECTIVE / INTERVAL HISTORY:  He has been doing well  Denies any recent hospitalizations or illnesses  Denies any chest pain shortness of breath or swelling  His appetite remains stable  He had is chronically incontinent of urine given history of radiation for prostate cancer  Review of Systems      OBJECTIVE:  /70 (BP Location: Left arm, Patient Position: Sitting, Cuff Size: Adult)   Pulse 66   Ht 5' 10" (1 778 m)   Wt 79 8 kg (176 lb)   BMI 25 25 kg/m²   Vitals:    08/28/19 0856   Weight: 79 8 kg (176 lb)       Physical Exam   Constitutional: He is oriented to person, place, and time  No distress  HENT:   Head: Normocephalic  Eyes: No scleral icterus  Neck: Neck supple  Cardiovascular: Normal rate and regular rhythm  Pulmonary/Chest: Effort normal and breath sounds normal    Abdominal: Soft  Musculoskeletal: He exhibits no edema  Neurological: He is alert and oriented to person, place, and time  Skin: Skin is warm and dry           Medications:    Current Outpatient Medications:     aspirin 81 mg chewable tablet, Chew 81 mg daily Will stop 10/16 , Disp: , Rfl:     atorvastatin (LIPITOR) 20 mg tablet, Take 1 tablet (20 mg total) by mouth daily, Disp: 90 tablet, Rfl: 3   Cyanocobalamin (B-12) 1000 MCG CAPS, Take by mouth daily, Disp: , Rfl:     furosemide (LASIX) 20 mg tablet, Take 20 mg by mouth as needed , Disp: , Rfl:     isosorbide mononitrate (IMDUR) 30 mg 24 hr tablet, Take 1 tablet (30 mg total) by mouth daily, Disp: 90 tablet, Rfl: 3    metoprolol tartrate (LOPRESSOR) 50 mg tablet, Take 0 5 tablets (25 mg total) by mouth every 12 (twelve) hours, Disp: 180 tablet, Rfl: 3    Multiple Vitamin (MULTIVITAMIN) tablet, Take 1 tablet by mouth daily, Disp: , Rfl:     nitroglycerin (NITROSTAT) 0 4 mg SL tablet, Place 1 tablet (0 4 mg total) under the tongue every 5 (five) minutes as needed for chest pain, Disp: 25 tablet, Rfl: 5    ondansetron (ZOFRAN-ODT) 8 mg disintegrating tablet, Take 8 mg by mouth every 8 (eight) hours as needed for nausea or vomiting, Disp: , Rfl:     ranolazine (RANEXA) 500 mg 12 hr tablet, Take 1 tablet (500 mg total) by mouth 2 (two) times a day, Disp: 180 tablet, Rfl: 3    Calcium Carbonate-Vitamin D3 600-400 MG-UNIT TABS, Take 1,000 Units by mouth daily, Disp: , Rfl:     FLUAD 0 5 ML JAX, inject 0 5 milliliter intramuscularly, Disp: , Rfl: 0    MYRBETRIQ 25 MG TB24, Take 25 mg by mouth daily, Disp: , Rfl: 1    prochlorperazine (COMPAZINE) 10 mg tablet, Take 10 mg by mouth every 6 (six) hours as needed for nausea or vomiting, Disp: , Rfl:     Laboratory Results:  Results for orders placed or performed in visit on 08/05/19   CBC   Result Value Ref Range    WBC 5 27 4 31 - 10 16 Thousand/uL    RBC 4 38 3 88 - 5 62 Million/uL    Hemoglobin 13 7 12 0 - 17 0 g/dL    Hematocrit 43 2 36 5 - 49 3 %    MCV 99 (H) 82 - 98 fL    MCH 31 3 26 8 - 34 3 pg    MCHC 31 7 31 4 - 37 4 g/dL    RDW 15 1 11 6 - 15 1 %    Platelets 115 628 - 138 Thousands/uL    MPV 11 0 8 9 - 12 7 fL   Renal function panel   Result Value Ref Range    Albumin 3 8 3 5 - 5 0 g/dL    Calcium 9 0 8 3 - 10 1 mg/dL    Phosphorus 2 3 2 3 - 4 1 mg/dL    Glucose 153 (H) 65 - 140 mg/dL    BUN 20 5 - 25 mg/dL    Creatinine 1 16 0 60 - 1 30 mg/dL    Sodium 135 (L) 136 - 145 mmol/L    Potassium 4 4 3 5 - 5 3 mmol/L    Chloride 103 100 - 108 mmol/L    CO2 28 21 - 32 mmol/L    ANION GAP 4 4 - 13 mmol/L    eGFR 61 ml/min/1 73sq m   Uric acid   Result Value Ref Range    Uric Acid 7 7 4 2 - 8 0 mg/dL   Protein / creatinine ratio, urine   Result Value Ref Range    Creatinine, Ur 92 5 mg/dL    Protein Urine Random 21 mg/dL    Prot/Creat Ratio, Ur 0 23 (H) 0 00 - 0 10   PTH, intact   Result Value Ref Range    PTH 60 3 18 4 - 80 1 pg/mL

## 2019-11-04 ENCOUNTER — APPOINTMENT (OUTPATIENT)
Dept: LAB | Facility: CLINIC | Age: 74
End: 2019-11-04
Payer: MEDICARE

## 2019-11-04 DIAGNOSIS — E78.2 MIXED HYPERLIPIDEMIA: Chronic | ICD-10-CM

## 2019-11-04 DIAGNOSIS — I10 ESSENTIAL HYPERTENSION: ICD-10-CM

## 2019-11-04 LAB
ANION GAP SERPL CALCULATED.3IONS-SCNC: 7 MMOL/L (ref 4–13)
AST SERPL W P-5'-P-CCNC: 47 U/L (ref 5–45)
BUN SERPL-MCNC: 21 MG/DL (ref 5–25)
CALCIUM SERPL-MCNC: 9.2 MG/DL (ref 8.3–10.1)
CHLORIDE SERPL-SCNC: 105 MMOL/L (ref 100–108)
CHOLEST SERPL-MCNC: 159 MG/DL (ref 50–200)
CO2 SERPL-SCNC: 27 MMOL/L (ref 21–32)
CREAT SERPL-MCNC: 1.2 MG/DL (ref 0.6–1.3)
GFR SERPL CREATININE-BSD FRML MDRD: 58 ML/MIN/1.73SQ M
GLUCOSE P FAST SERPL-MCNC: 155 MG/DL (ref 65–99)
HDLC SERPL-MCNC: 35 MG/DL
LDLC SERPL CALC-MCNC: 56 MG/DL (ref 0–100)
NONHDLC SERPL-MCNC: 124 MG/DL
POTASSIUM SERPL-SCNC: 5 MMOL/L (ref 3.5–5.3)
SODIUM SERPL-SCNC: 139 MMOL/L (ref 136–145)
TRIGL SERPL-MCNC: 339 MG/DL

## 2019-11-04 PROCEDURE — 80061 LIPID PANEL: CPT

## 2019-11-04 PROCEDURE — 84450 TRANSFERASE (AST) (SGOT): CPT

## 2019-11-04 PROCEDURE — 36415 COLL VENOUS BLD VENIPUNCTURE: CPT

## 2019-11-04 PROCEDURE — 80048 BASIC METABOLIC PNL TOTAL CA: CPT

## 2019-11-05 DIAGNOSIS — I25.118 CORONARY ARTERY DISEASE OF NATIVE ARTERY OF NATIVE HEART WITH STABLE ANGINA PECTORIS (HCC): Chronic | ICD-10-CM

## 2020-02-25 ENCOUNTER — OFFICE VISIT (OUTPATIENT)
Dept: CARDIOLOGY CLINIC | Facility: CLINIC | Age: 75
End: 2020-02-25
Payer: COMMERCIAL

## 2020-02-25 VITALS
BODY MASS INDEX: 24.02 KG/M2 | HEART RATE: 60 BPM | WEIGHT: 167.8 LBS | RESPIRATION RATE: 16 BRPM | HEIGHT: 70 IN | DIASTOLIC BLOOD PRESSURE: 76 MMHG | SYSTOLIC BLOOD PRESSURE: 122 MMHG

## 2020-02-25 DIAGNOSIS — I34.0 NON-RHEUMATIC MITRAL REGURGITATION: ICD-10-CM

## 2020-02-25 DIAGNOSIS — E78.2 MIXED HYPERLIPIDEMIA: Chronic | ICD-10-CM

## 2020-02-25 DIAGNOSIS — I25.118 CORONARY ARTERY DISEASE OF NATIVE ARTERY OF NATIVE HEART WITH STABLE ANGINA PECTORIS (HCC): Primary | Chronic | ICD-10-CM

## 2020-02-25 DIAGNOSIS — I45.2 BIFASCICULAR BUNDLE BRANCH BLOCK: ICD-10-CM

## 2020-02-25 DIAGNOSIS — I10 ESSENTIAL HYPERTENSION: ICD-10-CM

## 2020-02-25 PROCEDURE — 99214 OFFICE O/P EST MOD 30 MIN: CPT | Performed by: INTERNAL MEDICINE

## 2020-02-25 RX ORDER — NITROGLYCERIN 0.4 MG/1
0.4 TABLET SUBLINGUAL
Qty: 25 TABLET | Refills: 5 | Status: SHIPPED | OUTPATIENT
Start: 2020-02-25 | End: 2021-09-28 | Stop reason: SDUPTHER

## 2020-02-25 RX ORDER — RANOLAZINE 500 MG/1
500 TABLET, EXTENDED RELEASE ORAL 2 TIMES DAILY
Qty: 180 TABLET | Refills: 3 | Status: SHIPPED | OUTPATIENT
Start: 2020-02-25 | End: 2021-03-16 | Stop reason: SDUPTHER

## 2020-02-25 RX ORDER — ISOSORBIDE MONONITRATE 30 MG/1
30 TABLET, EXTENDED RELEASE ORAL DAILY
Qty: 90 TABLET | Refills: 3 | Status: SHIPPED | OUTPATIENT
Start: 2020-02-25 | End: 2021-03-09 | Stop reason: SDUPTHER

## 2020-02-25 RX ORDER — ATORVASTATIN CALCIUM 20 MG/1
20 TABLET, FILM COATED ORAL DAILY
Qty: 90 TABLET | Refills: 3 | Status: SHIPPED | OUTPATIENT
Start: 2020-02-25 | End: 2021-03-09 | Stop reason: SDUPTHER

## 2020-02-25 NOTE — PROGRESS NOTES
Cardiology Follow Up    Juana Randall  1945  [id number]  [address]  [address]  [facility] [id number] [phone]    Reason for visit: 6 month FU for chronic angina, CAD s/p remote coronary artery bypass grafting with  Catheterization 2018   showing severe native circumflex disease not amenable to intervention (he had patent LIMA to LAD and SVG to the RCA was patent) , hypertension, hyperlipidemia, right bundle branch block/LAFB  and moderate mitral regurgitation         1  Coronary artery disease of native artery of native heart with stable angina pectoris ([facility] )     2  Essential hypertension     3  Mixed hyperlipidemia     4  Non-rheumatic mitral regurgitation     5  Bifascicular bundle branch block         Interval History:  Since the patient's last visit, he has been diagnosed with diabetes  He denies chest pain, shortness of breath, palpitations or lightheadedness  He does get some mild peripheral edema times      Patient Active Problem List   Diagnosis    Leukocytosis    Chronic anemia    Chronic stable angina (HCC)    CAD (coronary artery disease)    Hyperlipidemia    Bifascicular bundle branch block    Transitional cell bladder cancer ([facility]    NSTEMI (non-ST elevated myocardial infarction) ([facility]    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    Essential hypertension    Chronic kidney disease (CKD) stage G3a/A1, moderately decreased glomerular filtration rate (GFR) between 45-59 mL/min/1 73 square meter and albuminuria creatinine ratio less than 30 mg/g ([facility])    Stress incontinence of urine    Solitary kidney, acquired    Non-rheumatic mitral regurgitation    Intraepithelial carcinoma    Acute cystitis without hematuria    CIS (carcinoma in situ of bladder)    Bladder mass     Past Medical History:   Diagnosis Date    Anesthesia     "can't urinate after surgery"    Basal cell carcinoma     left arm/ right eyelid/ right hand/ above right eyebrow in past    Bladder mass     Cataract     rosalind    Coronary artery disease     CABG X 2 2004    Essential tremor     of head and hands bilat    Hearing aid worn     bilat    History of kidney stones     History of pneumonia     childhood    History of prostate cancer     History of transfusion     2017    Hx of radiation therapy     2007 for after prostate surgery    Hyperlipidemia     Hypertension     Kidney stone     in past    [facility] infarction ([facility]     Prostate CA ([facility] )     2002- had prostatectomy    Scab     black scab right thumb area- biopsy taken by md recently- bandaid over area    Transitional cell bladder cancer ([facility] )     had BCG    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)     and" left kidney and left ureter removed" 2017- had chemo    Urinary incontinence     wears Depends    Urinary incontinence     Wears glasses     Wears glasses     Wears partial dentures     lower     Social History     Socioeconomic History    Marital status: /Civil Union     Spouse name: Not on file    Number of children: Not on file    Years of education: Not on file    Highest education level: Not on file   Occupational History    Not on file   Social Needs    Financial resource strain: Not on file    Food insecurity:     Worry: Not on file     Inability: Not on file    Transportation needs:     Medical: Not on file     Non-medical: Not on file   Tobacco Use    Smoking status: Never Smoker    Smokeless tobacco: Never Used   Substance and Sexual Activity    Alcohol use: Yes     Comment: few x year    Drug use: No    Sexual activity: Not on file   Lifestyle    Physical activity:     Days per week: Not on file     Minutes per session: Not on file    Stress: Not on file   Relationships    Social connections:     Talks on phone: Not on file     Gets together: Not on file     Attends [employer] service: Not on file     Active member of club or organization: Not on file     Attends meetings of clubs or organizations: Not on file     Relationship status: Not on file    Intimate partner violence:     Fear of current or ex partner: Not on file     Emotionally abused: Not on file     Physically abused: Not on file     Forced sexual activity: Not on file   Other Topics Concern    Not on file   Social History Narrative    Not on file      Family History   Problem Relation Age of Onset    No Known Problems Mother     No Known Problems Father      Past Surgical History:   Procedure Laterality Date    APPENDECTOMY      BACK SURGERY      lumbar herniated disc repair    CARDIAC CATHETERIZATION      COLONOSCOPY      CORONARY ARTERY BYPASS GRAFT      x2 in 2004   [address] N/A 7/3/2017    Procedure: BLADDER BIOPSY;  Surgeon: Sana Edmondson MD;  [facility];  Service: Urology    CYSTOSCOPY W/ RETROGRADES Right 7/3/2017    Procedure: CYSTOSCOPY WITH RETROGRADE PYELOGRAM;  Surgeon: Sana Edmondson MD;  [facility];  Service: Urology    HERNIA REPAIR      groin    NEPHRECTOMY Left     and left ureter    TX CYSTOURETHROSCOPY,FULGUR <0 5 CM LESN Right 10/23/2018    Procedure: CYSTO, RETROGRADE, TURBT;  Surgeon: Sana Edmondson MD;  [facility];  Service: Urology    TX INSTILL ANTICANCER AGENT IN BLADDER N/A 10/23/2018    Procedure: INSTILLATION MITOMYCIN;  Surgeon: Sana Edmondson MD;  [facility];  Service: Urology   [address] [place] [address] Left     SHOULDER SURGERY      dislocation    SKIN CANCER EXCISION  2011    left arm and right eyebrow       Current Outpatient Medications:     aspirin 81 mg chewable tablet, Chew 81 mg daily Will stop 10/16 , Disp: , Rfl:     atorvastatin (LIPITOR) 20 mg tablet, Take 1 tablet (20 mg total) by mouth daily, Disp: 90 tablet, Rfl: 3    Cyanocobalamin (B-12) 1000 MCG CAPS, Take by mouth daily, Disp: , Rfl:     furosemide (LASIX) 20 mg tablet, Take 20 mg by mouth as needed , Disp: , Rfl:     isosorbide mononitrate (IMDUR) 30 mg 24 hr tablet, Take 1 tablet (30 mg total) by mouth daily, Disp: 90 tablet, Rfl: 3    metoprolol tartrate (LOPRESSOR) 25 mg tablet, Take 1 tablet (25 mg total) by mouth every 12 (twelve) hours, Disp: 180 tablet, Rfl: 3    Multiple Vitamin (MULTIVITAMIN) tablet, Take 1 tablet by mouth daily, Disp: , Rfl:     nitroglycerin (NITROSTAT) 0 4 mg SL tablet, Place 1 tablet (0 4 mg total) under the tongue every 5 (five) minutes as needed for chest pain, Disp: 25 tablet, Rfl: 5    ranolazine (RANEXA) 500 mg 12 hr tablet, Take 1 tablet (500 mg total) by mouth 2 (two) times a day, Disp: 180 tablet, Rfl: 3  Allergies   Allergen Reactions    Levaquin [Levofloxacin] Rash    Percocet [Oxycodone-Acetaminophen] GI Intolerance     Constipation,,,happens with narcotics like percocet    Lovastatin Other (See Comments)     myopathy       Review of Systems:  Review of Systems   Constitutional: Positive for fatigue  Negative for activity change, appetite change and unexpected weight change (intentional wt loss)  Respiratory: Negative for cough, chest tightness, shortness of breath and wheezing  Cardiovascular: Positive for leg swelling  Negative for chest pain and palpitations  Gastrointestinal: Negative for abdominal pain, blood in stool, constipation and diarrhea  Genitourinary: Positive for frequency  Negative for dysuria, hematuria and urgency  Musculoskeletal: Positive for arthralgias and back pain  Negative for gait problem and joint swelling  Neurological: Negative for dizziness, syncope, light-headedness and headaches  Psychiatric/Behavioral: Negative for agitation, behavioral problems, confusion and decreased concentration         Physical Exam:  Vitals:    02/25/20 1115   BP: 122/76   Pulse: 60   Resp: 16   Weight: 76 1 kg (167 lb 12 8 oz)   Height: 5' 10" (1 778 m)       Physical Exam   Constitutional: He is oriented to person, place, and time  He appears well-developed and well-nourished  No distress  HENT:   Head: Normocephalic and atraumatic  Mouth/Throat: Oropharynx is clear and moist  No oropharyngeal exudate  Eyes: Conjunctivae are normal  No scleral icterus  Neck: Neck supple  Normal carotid pulses and no JVD present  Carotid bruit is not present  No thyromegaly present  Cardiovascular: Normal rate and regular rhythm  Exam reveals no gallop and no friction rub  Murmur heard  Systolic (apical) murmur is present with a grade of 1/6  No diastolic murmur is present  Pulses:       Dorsalis pedis pulses are 0 on the right side, and 0 on the left side  Posterior tibial pulses are 1+ on the right side, and 1+ on the left side  Pulmonary/Chest: Breath sounds normal  He has no wheezes  He has no rhonchi  He has no rales  Abdominal: Soft  He exhibits no mass  There is no hepatosplenomegaly  There is no tenderness  Musculoskeletal: He exhibits deformity (kyphosis)  He exhibits no edema or tenderness  Neurological: He is alert and oriented to person, place, and time  He displays tremor  No cranial nerve deficit or sensory deficit  Skin: Skin is warm and dry  No rash noted  No erythema  No pallor  Discussion/Summary:  1  CAD with anatomy as detailed on last cardiac catheterization in 2018  Having no angina ranolazine, isosorbide and metoprolol  On aspirin as well  2  Hypertension  Blood pressure excellent on nitrates and beta-blockers  Continue same  3  Mixed hyperlipidemia  LDL cholesterol 56 with HDL cholesterol 35 on current dose of atorvastatin  Continue same  Triglycerides of elevated into the 300 range but he has lost weight and is watching a low concentrated sweets low starch diet and expect these will improve without specific therapy  4  Mitral regurgitation  Moderate on echo in 2018  Barely audible on exam   Will observe going forward  5  Bifascicular bundle branch block    No evidence for bradycardia arrhythmia        FU 6 months    Miah Díaz MD

## 2020-04-02 ENCOUNTER — TELEPHONE (OUTPATIENT)
Dept: CARDIOLOGY CLINIC | Facility: CLINIC | Age: 75
End: 2020-04-02

## 2020-04-06 ENCOUNTER — TELEPHONE (OUTPATIENT)
Dept: CARDIOLOGY CLINIC | Facility: CLINIC | Age: 75
End: 2020-04-06

## 2020-09-01 ENCOUNTER — OFFICE VISIT (OUTPATIENT)
Dept: CARDIOLOGY CLINIC | Facility: CLINIC | Age: 75
End: 2020-09-01
Payer: COMMERCIAL

## 2020-09-01 VITALS
SYSTOLIC BLOOD PRESSURE: 120 MMHG | TEMPERATURE: 98.4 F | HEIGHT: 70 IN | DIASTOLIC BLOOD PRESSURE: 66 MMHG | HEART RATE: 60 BPM | BODY MASS INDEX: 23.25 KG/M2 | WEIGHT: 162.4 LBS

## 2020-09-01 DIAGNOSIS — I34.0 NON-RHEUMATIC MITRAL REGURGITATION: ICD-10-CM

## 2020-09-01 DIAGNOSIS — I25.118 CORONARY ARTERY DISEASE OF NATIVE ARTERY OF NATIVE HEART WITH STABLE ANGINA PECTORIS (HCC): Chronic | ICD-10-CM

## 2020-09-01 DIAGNOSIS — I25.10 CORONARY ARTERY DISEASE INVOLVING NATIVE CORONARY ARTERY OF NATIVE HEART WITHOUT ANGINA PECTORIS: Primary | ICD-10-CM

## 2020-09-01 DIAGNOSIS — E78.2 MIXED HYPERLIPIDEMIA: Chronic | ICD-10-CM

## 2020-09-01 DIAGNOSIS — I45.2 BIFASCICULAR BUNDLE BRANCH BLOCK: ICD-10-CM

## 2020-09-01 DIAGNOSIS — I10 ESSENTIAL HYPERTENSION: ICD-10-CM

## 2020-09-01 PROCEDURE — 93000 ELECTROCARDIOGRAM COMPLETE: CPT | Performed by: INTERNAL MEDICINE

## 2020-09-01 PROCEDURE — 99213 OFFICE O/P EST LOW 20 MIN: CPT | Performed by: INTERNAL MEDICINE

## 2020-09-01 RX ORDER — CYCLOSPORINE 0.5 MG/ML
1 EMULSION OPHTHALMIC 2 TIMES DAILY
COMMUNITY

## 2020-09-01 NOTE — PROGRESS NOTES
BMP--labs today    Ultrasound of the heart and pacer check next week and see Dr. Llanes back 2 days after the tests    Increase Toprol to 50mg twice daily--see enclosed rx       Cardiology Follow Up    Ge Pool  1945  [id number]  [address]  [address]  [facility] [id number] [phone]    Reason for visit: 6 month FU for chronic angina, CAD s/p remote coronary artery bypass grafting with  Catheterization 2018   showing severe native circumflex disease not amenable to intervention (he had patent LIMA to LAD and SVG to the RCA was patent) , hypertension, hyperlipidemia, right bundle branch block/LAFB  and moderate mitral regurgitation      1  Coronary artery disease of native artery of native heart with stable angina pectoris (HCC)  POCT ECG   2  Essential hypertension     3  Non-rheumatic mitral regurgitation     4  Bifascicular bundle branch block     5  Mixed hyperlipidemia         Interval History:   Since the patient's last visit, he has done well  He denies chest pain, shortness of breath, palpitations or dizziness    He does get some very mild lower extremity edema at times        Patient Active Problem List   Diagnosis    Leukocytosis    Chronic anemia    [facility] stable [facility] ([facility]    CAD (coronary artery disease)    Hyperlipidemia    Bifascicular bundle branch block    [facility]    NSTEMI (non-ST elevated myocardial infarction) ([facility] )    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    Essential hypertension    Chronic kidney disease (CKD) stage G3a/A1, moderately decreased glomerular filtration rate (GFR) between 45-59 mL/min/1 73 square meter and albuminuria creatinine ratio less than 30 mg/g ([facility])    Stress incontinence of urine    Solitary kidney, acquired    Non-rheumatic mitral regurgitation    Intraepithelial carcinoma    Acute cystitis without hematuria    CIS (carcinoma in situ of bladder)    Bladder mass     Past Medical History:   Diagnosis Date    Anesthesia     "can't urinate after surgery"    Basal cell carcinoma     left arm/ right eyelid/ right hand/ above right eyebrow in past    Bladder mass     Cataract     rosalind    Coronary artery disease     CABG X 2 2004    Essential tremor     of head and hands bilat    Hearing aid worn     bilat    History of kidney stones     History of pneumonia     childhood    History of prostate cancer     History of transfusion     2017    Hx of radiation therapy     2007 for after prostate surgery    Hyperlipidemia     Hypertension     Kidney stone     in past    [facility]     Prostate CA 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)     and" left kidney and left ureter removed" 2017- had chemo    Urinary incontinence     wears Depends    Urinary incontinence     Wears glasses     Wears glasses     Wears partial dentures     lower     Social History     Socioeconomic History    Marital status: /Civil Union     Spouse name: Not on file    Number of children: Not on file    Years of education: Not on file    Highest education level: Not on file   Occupational History    Not on file   Social Needs    Financial resource strain: Not on file    Food insecurity     Worry: Not on file     Inability: Not on file   [employer] needs     Medical: Not on file     Non-medical: Not on file   Tobacco Use    Smoking status: Never Smoker    Smokeless tobacco: Never Used   Substance and Sexual Activity    Alcohol use: Yes     Comment: few x year    Drug use: No    Sexual activity: Not on file   Lifestyle    Physical activity     Days per week: Not on file     Minutes per session: Not on file    Stress: Not on file   Relationships    Social connections     Talks on phone: Not on file     Gets together: Not on file     Attends Latter-day service: Not on file     Active member of club or organization: Not on file     Attends meetings of clubs or organizations: Not on file     Relationship status: Not on file    Intimate partner violence     Fear of current or ex partner: Not on file     Emotionally abused: Not on file     Physically abused: Not on file     Forced sexual activity: Not on file   Other Topics Concern    Not on file   Social History Narrative    Not on file      Family History   Problem Relation Age of Onset    No Known Problems Mother     No Known Problems Father      Past Surgical History:   Procedure Laterality Date    APPENDECTOMY      BACK SURGERY      lumbar herniated disc repair    CARDIAC CATHETERIZATION      COLONOSCOPY      CORONARY ARTERY BYPASS GRAFT      x2 in 2004   [address] 7/3/2017    Procedure: BLADDER BIOPSY;  Surgeon: Rod Brandt MD;  [facility];  Service: Urology    CYSTOSCOPY W/ RETROGRADES Right 7/3/2017    Procedure: CYSTOSCOPY WITH RETROGRADE PYELOGRAM;  Surgeon: Rod Brandt MD;  [facility];  Service: Urology    HERNIA REPAIR      groin    NEPHRECTOMY Left     and left ureter    MS CYSTOURETHROSCOPY,FULGUR <0 5 CM LESN Right 10/23/2018    Procedure: CYSTO, RETROGRADE, TURBT;  Surgeon: Rod Brandt MD;  [facility];  Service: Urology    MS INSTILL ANTICANCER AGENT IN BLADDER N/A 10/23/2018    Procedure: INSTILLATION MITOMYCIN;  Surgeon: Rod Brandt MD;  [facility];  Service: Urology   [address] [place] [address] Left     SHOULDER SURGERY      dislocation    SKIN CANCER EXCISION  2011    left arm and right eyebrow       Current Outpatient Medications:     aspirin 81 mg chewable tablet, Chew 81 mg daily Will stop 10/16 , Disp: , Rfl:     atorvastatin (LIPITOR) 20 mg tablet, Take 1 tablet (20 mg total) by mouth daily, Disp: 90 tablet, Rfl: 3    Cyanocobalamin (B-12) 1000 MCG CAPS, Take by mouth daily, Disp: , Rfl:     cycloSPORINE (RESTASIS) 0 05 % ophthalmic emulsion, Apply 1 drop to eye 2 (two) times a day, Disp: , Rfl:     furosemide (LASIX) 20 mg tablet, Take 20 mg by mouth as needed , Disp: , Rfl:     isosorbide mononitrate (IMDUR) 30 mg 24 hr tablet, Take 1 tablet (30 mg total) by mouth daily, Disp: 90 tablet, Rfl: 3    metoprolol tartrate (LOPRESSOR) 25 mg tablet, Take 1 tablet (25 mg total) by mouth every 12 (twelve) hours, Disp: 180 tablet, Rfl: 3    Multiple Vitamin (MULTIVITAMIN) tablet, Take 1 tablet by mouth daily, Disp: , Rfl:     Multiple Vitamins-Minerals (PRESERVISION AREDS 2 PO), Take by mouth, Disp: , Rfl:     nitroglycerin (NITROSTAT) 0 4 mg SL tablet, Place 1 tablet (0 4 mg total) under the tongue every 5 (five) minutes as needed for chest pain, Disp: 25 tablet, Rfl: 5    ranolazine (RANEXA) 500 mg 12 hr tablet, Take 1 tablet (500 mg total) by mouth 2 (two) times a day, Disp: 180 tablet, Rfl: 3  Allergies   Allergen Reactions    Levaquin [Levofloxacin] Rash    Percocet [Oxycodone-Acetaminophen] GI Intolerance     Constipation,,,happens with narcotics like percocet    Lovastatin Other (See Comments)     myopathy         Review of Systems:  Review of Systems   Constitutional: Positive for fatigue  Negative for activity change, appetite change and unexpected weight change  Respiratory: Negative for cough, shortness of breath and wheezing  Cardiovascular: Positive for leg swelling  Negative for chest pain and palpitations  Gastrointestinal: Negative for blood in stool, constipation and diarrhea  Musculoskeletal: Negative for arthralgias and back pain  Neurological: Negative for dizziness and light-headedness  Physical Exam:  Vitals:    09/01/20 1039   BP: 120/66   Pulse: 60   Temp: 98 4 °F (36 9 °C)   Weight: 73 7 kg (162 lb 6 4 oz)   Height: 5' 10" (1 778 m)       Physical Exam  Constitutional:       General: He is not in acute distress  Appearance: Normal appearance  He is not ill-appearing     HENT:      Head: Normocephalic and atraumatic  Eyes:      General: No scleral icterus  Conjunctiva/sclera: Conjunctivae normal    Neck:      Musculoskeletal: Neck supple  Thyroid: No thyroid mass or thyromegaly  Vascular: Normal carotid pulses  No carotid bruit or JVD  Cardiovascular:      Rate and Rhythm: Normal rate and regular rhythm  Heart sounds: Murmur (apical grade 1 systolic) present  No friction rub  No gallop  Pulmonary:      Breath sounds: Decreased breath sounds present  No wheezing, rhonchi or rales  Abdominal:      Palpations: Abdomen is soft  There is no hepatomegaly, splenomegaly or mass  Tenderness: There is no abdominal tenderness  Musculoskeletal:         General: No swelling  Neurological:      Mental Status: He is alert  Discussion/Summary:  1  CAD with remote CABG and more recent cardiac catheterization showing severe native circumflex disease  Having no angina on nitrates, beta-blockers and ranolazine  Continue same  Takes baby aspirin  2  Hypertension  Excellent control on nitrates and beta-blockers  3  Mitral regurgitation  Deemed moderate on ECHO in 2018  Barely audible on exam   Doubt significant lesion  4  Bifascicular bundle branch block  Resting heart rate 60 beats per minute  No symptoms to suggest profound bradycardia or pauses  5  Mixed hyperlipidemia  Patient on atorvastatin 20 mg daily  LDL cholesterol 56 with HDL cholesterol 32  Triglycerides a little bit over 200 but patient is working hard to lower hemoglobin A1c in this should help triglycerides      Follow-up 6 months    Davion Muniz MD

## 2021-01-19 DIAGNOSIS — I25.118 CORONARY ARTERY DISEASE OF NATIVE ARTERY OF NATIVE HEART WITH STABLE ANGINA PECTORIS (HCC): Chronic | ICD-10-CM

## 2021-01-19 DIAGNOSIS — I25.10 CORONARY ARTERY DISEASE INVOLVING NATIVE CORONARY ARTERY OF NATIVE HEART WITHOUT ANGINA PECTORIS: ICD-10-CM

## 2021-02-12 DIAGNOSIS — Z23 ENCOUNTER FOR IMMUNIZATION: ICD-10-CM

## 2021-03-09 DIAGNOSIS — I25.118 CORONARY ARTERY DISEASE OF NATIVE ARTERY OF NATIVE HEART WITH STABLE ANGINA PECTORIS (HCC): Chronic | ICD-10-CM

## 2021-03-09 DIAGNOSIS — E78.2 MIXED HYPERLIPIDEMIA: Chronic | ICD-10-CM

## 2021-03-09 RX ORDER — ATORVASTATIN CALCIUM 20 MG/1
20 TABLET, FILM COATED ORAL DAILY
Qty: 90 TABLET | Refills: 3 | Status: SHIPPED | OUTPATIENT
Start: 2021-03-09 | End: 2022-03-01 | Stop reason: SDUPTHER

## 2021-03-09 RX ORDER — ISOSORBIDE MONONITRATE 30 MG/1
30 TABLET, EXTENDED RELEASE ORAL DAILY
Qty: 90 TABLET | Refills: 3 | Status: SHIPPED | OUTPATIENT
Start: 2021-03-09

## 2021-03-16 ENCOUNTER — OFFICE VISIT (OUTPATIENT)
Dept: CARDIOLOGY CLINIC | Facility: CLINIC | Age: 76
End: 2021-03-16
Payer: COMMERCIAL

## 2021-03-16 VITALS
HEART RATE: 68 BPM | WEIGHT: 164.6 LBS | DIASTOLIC BLOOD PRESSURE: 70 MMHG | BODY MASS INDEX: 23.62 KG/M2 | SYSTOLIC BLOOD PRESSURE: 130 MMHG

## 2021-03-16 DIAGNOSIS — I25.118 CORONARY ARTERY DISEASE OF NATIVE ARTERY OF NATIVE HEART WITH STABLE ANGINA PECTORIS (HCC): Primary | Chronic | ICD-10-CM

## 2021-03-16 DIAGNOSIS — I25.118 CORONARY ARTERY DISEASE OF NATIVE ARTERY OF NATIVE HEART WITH STABLE ANGINA PECTORIS (HCC): Chronic | ICD-10-CM

## 2021-03-16 DIAGNOSIS — I34.0 NON-RHEUMATIC MITRAL REGURGITATION: ICD-10-CM

## 2021-03-16 DIAGNOSIS — I45.2 BIFASCICULAR BUNDLE BRANCH BLOCK: ICD-10-CM

## 2021-03-16 DIAGNOSIS — E78.2 MIXED HYPERLIPIDEMIA: Chronic | ICD-10-CM

## 2021-03-16 DIAGNOSIS — I10 ESSENTIAL HYPERTENSION: ICD-10-CM

## 2021-03-16 PROCEDURE — 99214 OFFICE O/P EST MOD 30 MIN: CPT | Performed by: INTERNAL MEDICINE

## 2021-03-16 RX ORDER — RANOLAZINE 500 MG/1
500 TABLET, EXTENDED RELEASE ORAL 2 TIMES DAILY
Qty: 180 TABLET | Refills: 3 | Status: SHIPPED | OUTPATIENT
Start: 2021-03-16 | End: 2022-03-16 | Stop reason: SDUPTHER

## 2021-03-16 NOTE — PROGRESS NOTES
Cardiology Follow Up    Washington Santiago  1945  [id number]  [address]  [address]  [facility] [address] [phone]    Reason for visit: 6 month FU for chronic angina, CAD s/p remote coronary artery bypass grafting with  Catheterization 2018   showing severe native circumflex disease not amenable to intervention (he had patent LIMA to LAD and SVG to the RCA was patent) , hypertension, hyperlipidemia, right bundle branch block/LAFB  and moderate mitral regurgitation      1  Coronary artery disease of native artery of native heart with stable angina pectoris ([address] )     2  Essential hypertension     3  Non-rheumatic mitral regurgitation     4  Bifascicular bundle branch block     5  Mixed hyperlipidemia         Interval History:  Since the patient's last visit, he has done well    He denies chest pain, shortness of breath, palpitations, edema or lightheadedness      Patient Active Problem List   Diagnosis    Leukocytosis    Chronic anemia    CAD (coronary artery disease)    Hyperlipidemia    Bifascicular bundle branch block    Transitional cell bladder cancer (HCC)    NSTEMI (non-ST elevated myocardial infarction) (HCC)    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    Essential hypertension    Chronic kidney disease (CKD) stage G3a/A1, moderately decreased glomerular filtration rate (GFR) between 45-59 mL/min/1 73 square meter and albuminuria creatinine ratio less than 30 mg/g    Stress incontinence of urine    Solitary kidney, acquired    Non-rheumatic mitral regurgitation    Intraepithelial carcinoma    Acute cystitis without hematuria    CIS (carcinoma in situ of bladder)    Bladder mass     Past Medical History:   Diagnosis Date    Anesthesia     "can't urinate after surgery"    Basal cell carcinoma     left arm/ right eyelid/ right hand/ above right eyebrow in past    Bladder mass     Cataract     rosalind    Coronary artery disease     CABG X 2 2004    Essential tremor     of head and hands bilat    Hearing aid worn     bilat    History of kidney stones     History of pneumonia     childhood    History of prostate cancer     History of transfusion     2017    Hx of radiation therapy     2007 for after prostate surgery    Hyperlipidemia     Hypertension     Kidney stone     in past    Myocardial infarction [facility] [facility]     [facility]     2002- had prostatectomy    Scab     black scab right thumb area- biopsy taken by md bruce- bandaid over area    Transitional cell bladder cancer ([facility] )     had BCG    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)     and" left kidney and left ureter removed" 2017- had chemo    Urinary incontinence     wears Depends    Urinary incontinence     Wears glasses     Wears glasses     Wears partial dentures     lower     Social History     Socioeconomic History    Marital status: /Civil Union     Spouse name: Not on file    Number of children: Not on file    Years of education: Not on file    Highest education level: Not on file   Occupational History    Not on file   Social Needs    Financial resource strain: Not on file    Food insecurity     Worry: Not on file     Inability: Not on file   [employer] needs     Medical: Not on file     Non-medical: Not on file   Tobacco Use    Smoking status: Never Smoker    Smokeless tobacco: Never Used   Substance and Sexual Activity    Alcohol use: Yes     Comment: few x year    Drug use: No    Sexual activity: Not on file   Lifestyle    Physical activity     Days per week: Not on file     Minutes per session: Not on file    Stress: Not on file   Relationships    Social connections     Talks on phone: Not on file     Gets together: Not on file     Attends Yazdanism service: Not on file     Active member of club or organization: Not on file     Attends meetings of clubs or organizations: Not on file Relationship status: Not on file    Intimate partner violence     Fear of current or ex partner: Not on file     Emotionally abused: Not on file     Physically abused: Not on file     Forced sexual activity: Not on file   Other Topics Concern    Not on file   Social History Narrative    Not on file      Family History   Problem Relation Age of Onset    No Known Problems Mother     No Known Problems Father      Past Surgical History:   Procedure Laterality Date    APPENDECTOMY      BACK SURGERY      lumbar herniated disc repair    CARDIAC CATHETERIZATION      COLONOSCOPY      CORONARY ARTERY BYPASS GRAFT      x2 in 2004   [address] N/A 7/3/2017    Procedure: BLADDER BIOPSY;  Surgeon: Chely Brooks MD;  [facility];  Service: Urology    CYSTOSCOPY W/ RETROGRADES Right 7/3/2017    Procedure: CYSTOSCOPY WITH RETROGRADE PYELOGRAM;  Surgeon: Chely Brooks MD;  [facility];  Service: Urology    HERNIA REPAIR      groin    NEPHRECTOMY Left     and left ureter    [place] CYSTOURETHROSCOPY,FULGUR <0 5 CM LESN Right 10/23/2018    Procedure: Sueanne Poplar, TURBT;  Surgeon: Chely Brooks MD;  [facility];  Service: Urology    [place] INSTILL ANTICANCER AGENT IN BLADDER N/A 10/23/2018    Procedure: INSTILLATION MITOMYCIN;  Surgeon: Chely Brooks MD;  [facility];  Service: Urology   [address] Left     SHOULDER SURGERY      dislocation    SKIN CANCER EXCISION  2011    left arm and right eyebrow       Current Outpatient Medications:     aspirin 81 mg chewable tablet, Chew 81 mg daily Will stop 10/16 , Disp: , Rfl:     atorvastatin (LIPITOR) 20 mg tablet, Take 1 tablet (20 mg total) by mouth daily, Disp: 90 tablet, Rfl: 3    Cyanocobalamin (B-12) 1000 MCG CAPS, Take by mouth daily, Disp: , Rfl:     cycloSPORINE (RESTASIS) 0 05 % ophthalmic emulsion, Apply 1 drop to eye 2 (two) times a day, Disp: , Rfl:     furosemide (LASIX) 20 mg tablet, Take 20 mg by mouth as needed , Disp: , Rfl:     isosorbide mononitrate (IMDUR) 30 mg 24 hr tablet, Take 1 tablet (30 mg total) by mouth daily, Disp: 90 tablet, Rfl: 3    metoprolol tartrate (LOPRESSOR) 25 mg tablet, Take 1 tablet (25 mg total) by mouth every 12 (twelve) hours, Disp: 180 tablet, Rfl: 3    Multiple Vitamin (MULTIVITAMIN) tablet, Take 1 tablet by mouth daily, Disp: , Rfl:     Multiple Vitamins-Minerals (PRESERVISION AREDS 2 PO), Take by mouth, Disp: , Rfl:     nitroglycerin (NITROSTAT) 0 4 mg SL tablet, Place 1 tablet (0 4 mg total) under the tongue every 5 (five) minutes as needed for chest pain, Disp: 25 tablet, Rfl: 5    ranolazine (RANEXA) 500 mg 12 hr tablet, Take 1 tablet (500 mg total) by mouth 2 (two) times a day, Disp: 180 tablet, Rfl: 3  Allergies   Allergen Reactions    Levaquin [Levofloxacin] Rash    Percocet [Oxycodone-Acetaminophen] GI Intolerance     Constipation,,,happens with narcotics like percocet    Codeine Other (See Comments)    Lovastatin Other (See Comments)     myopathy       Review of Systems:  Review of Systems   Constitutional: Positive for fatigue  Negative for activity change, appetite change and unexpected weight change  Respiratory: Negative for cough, chest tightness, shortness of breath and wheezing  Cardiovascular: Negative for chest pain, palpitations and leg swelling  Gastrointestinal: Negative for abdominal pain, blood in stool, constipation and diarrhea  Genitourinary: Positive for frequency (incontinent)  Negative for dysuria, hematuria and urgency  Musculoskeletal: Positive for back pain  Negative for arthralgias, gait problem and joint swelling  Neurological: Positive for numbness  Negative for dizziness, light-headedness and headaches  Psychiatric/Behavioral: Negative for agitation, behavioral problems, confusion and decreased concentration         Physical Exam:  Vitals:    03/16/21 1444   BP: 130/70   BP Location: Right arm Patient Position: Sitting   Cuff Size: Adult   Pulse: 68   Weight: 74 7 kg (164 lb 9 6 oz)       Physical Exam  Constitutional:       Appearance: Normal appearance  He is normal weight  HENT:      Head: Normocephalic and atraumatic  Mouth/Throat:      Mouth: Mucous membranes are moist       Pharynx: No oropharyngeal exudate or posterior oropharyngeal erythema  Eyes:      General: No scleral icterus  Conjunctiva/sclera: Conjunctivae normal    Neck:      Musculoskeletal: Neck supple  Thyroid: No thyroid mass or thyromegaly  Vascular: Normal carotid pulses  No carotid bruit or JVD  Cardiovascular:      Rate and Rhythm: Normal rate and regular rhythm  Pulses:           Dorsalis pedis pulses are 0 on the right side and 0 on the left side  Posterior tibial pulses are 0 on the right side and 0 on the left side  Heart sounds: Murmur (grade I apical murmur systolic    no diastolic murmur) present  No friction rub  No gallop  Pulmonary:      Breath sounds: Decreased breath sounds present  No wheezing, rhonchi or rales  Abdominal:      Palpations: Abdomen is soft  There is no hepatomegaly, splenomegaly or mass  Tenderness: There is no abdominal tenderness  Musculoskeletal:         General: Deformity (kyphosis) present  No swelling  Skin:     General: Skin is warm  Coloration: Skin is not jaundiced or pale  Findings: No bruising, erythema, lesion or rash  Neurological:      Mental Status: He is oriented to person, place, and time  Cranial Nerves: No cranial nerve deficit  Sensory: No sensory deficit  Motor: No weakness  Psychiatric:         Mood and Affect: Mood normal          Behavior: Behavior normal          Thought Content: Thought content normal          Judgment: Judgment normal          Discussion/Summary:  1  CAD status post remote CABG with cardiac catheterization in 2018 showing circumflex disease    During remarkably well and having no angina on regimen that includes isosorbide mononitrate 30 mg daily, Ranexa 500 mg q 12 hours and metoprolol tartrate 25 mg q 12 hours  Also on aspirin  Continue same  2  Hypertension  Well controlled on isosorbide and metoprolol  3  Mitral regurgitation  Deemed moderate on echo in 2018  Barely audible on exam   Likely not significant  4  Bifascicular bundle branch block  No evidence for bradycardia arrhythmias  5  Mixed hyperlipidemia  Patient on atorvastatin  Recent LDL cholesterol 46 with HDL cholesterol  35 and triglycerides of 333  The latter is likely being driven by elevated blood sugars      Follow-up 6 months      Derick Rosas MD

## 2021-04-27 ENCOUNTER — TELEPHONE (OUTPATIENT)
Dept: NEUROLOGY | Facility: CLINIC | Age: 76
End: 2021-04-27

## 2021-04-27 NOTE — TELEPHONE ENCOUNTER
Best contact number for patient: [phone]    Emergency Contact name and number: Narcisa(wife) [phone]    Referring provider and telephone number: Lauren Hawley    [facility]  [facility]    Reason for Appointment/Dx: Tremor    Have you seen and followed up with a pediatric Neurologist for this disease in the past?  No    Neurology Location patient would like to be seen: [facility]    Order received? Yes    Records Received? Yes    Have you ever seen another Neurologist?        Insurance Information    Insurance Name: Rachelle Dubin    Notes: Patient scheduled, added to waitlist and sent new patient paperwork      Appointment date:  09/08/21 with Dr Rayo Naranjo

## 2021-09-02 ENCOUNTER — CONSULT (OUTPATIENT)
Dept: NEUROLOGY | Facility: CLINIC | Age: 76
End: 2021-09-02
Payer: COMMERCIAL

## 2021-09-02 VITALS
HEART RATE: 60 BPM | DIASTOLIC BLOOD PRESSURE: 67 MMHG | BODY MASS INDEX: 23.3 KG/M2 | WEIGHT: 162.4 LBS | SYSTOLIC BLOOD PRESSURE: 120 MMHG

## 2021-09-02 DIAGNOSIS — G31.84 MILD COGNITIVE IMPAIRMENT: Primary | ICD-10-CM

## 2021-09-02 DIAGNOSIS — R41.3 MEMORY LOSS: ICD-10-CM

## 2021-09-02 DIAGNOSIS — G25.0 TREMOR, ESSENTIAL: ICD-10-CM

## 2021-09-02 PROCEDURE — 99214 OFFICE O/P EST MOD 30 MIN: CPT | Performed by: PSYCHIATRY & NEUROLOGY

## 2021-09-02 NOTE — PROGRESS NOTES
Patient ID: Gera Zimmer is a 64 y o  male  Assessment/Plan:    Tremor, essential  Progression of tremor now affecting activities of daily living  He has tried and failed multiple medications  Metoprolol is the only medication has provided partial relief but we are unable to increase medication given hypotension  and risk of bradycardia  Time spent  Answering questions regarding surgical options  He had questions regarding deep brain stimulation surgery and MRI focused ultrasound surgery  We discuss both procedures, potential benefits, and risks  Potential differences between both procedures were reviewed  He was noted on exam to have mild cognitive issues  He denies use affecting his normal activities of daily living  [place]  17/26, limited by his in about in ability to write  Due to tremor  Given mild cognitive impairment , furhter workup is needed before considering surgical options  Will obtain MRI brain  Labs looking for potential underlying issues which may contribute to memory loss    To include TSH, B12,  CBC and CMP performed within the past year reviewed  Will also refer for neuropsychological testing to further understand the extent of cognitive impairment so we can better discuss potential risks versus benefits of surgery  Diagnoses and all orders for this visit:    Mild cognitive impairment  -     MRI brain NeuroQuant wo contrast; Future  -     Ambulatory referral to Neuropsychology; Future  -     TSH, 3rd generation with Free T4 reflex; Future  -     Vitamin B12; Future    Tremor, essential  -     MRI brain NeuroQuant wo contrast; Future  -     Ambulatory referral to Neuropsychology; Future  -     TSH, 3rd generation with Free T4 reflex;  Future  -     Vitamin B12; Future           Subjective:      Gisselle Santana is a RH man with bladdder, skin, prostate cancer s/p radiation and chemotherapy and s/p nephrectomy for transitional cell carcinoma who presents for follow up essential tremor  Last seen by me in 2017  To review, tremors began in the left hand around 2011 and slowly progressed to the right hand and head  MRI of the brain and labs were unremarkable at that time  Medication trials:  Primidone with no improvement even at higher doses  topiramate was avoided given history of kidney stones   Metoprolol by his Cardiologist- partially helps tremors but cannot tolerate increase  gabapentin 100mg 2tabs bid was ineffective      He returns today with worsening bilateral hand tremors  This is worse left but noted more on the right  It is worse when excited or anxious  Handwriting is shaky but legible  He has intermittent mild head tremor  Tremor is worse with action but also  present at rest     He is unable to write  He has difficlty dressing and eating  He has imbalance when eyes are closed  Gait is unchanged  He may be a little slower in general  He denies any stiffness  He forgets conversation and names  He hunts  His wife now does the driving  Objective:    Blood pressure 120/67, pulse 60, weight 73 7 kg (162 lb 6 4 oz)  Physical Exam  Vitals reviewed  Eyes:      Extraocular Movements: Extraocular movements intact  Pupils: Pupils are equal, round, and reactive to light  Pulmonary:      Effort: Pulmonary effort is normal    Neurological:      Mental Status: He is alert  Deep Tendon Reflexes: Strength normal    Psychiatric:         Speech: Speech normal          Neurological Exam  Mental Status  Alert  Oriented to person, place, time and situation  Recent and remote memory are intact  Memory: Recalled 0/5  Visuospatial: Unable to assess  Speech is normal  Able to name parts of objects  Follows complex commands  Unable to perform serial calculations  and 3 backward  [facility] 17/26  Cranial Nerves  CN II: Right funduscopic exam: not visualized  Left funduscopic exam: not visualized    CN III, IV, VI: Extraocular movements intact bilaterally  Pupils equal round and reactive to light bilaterally  CN VII: Full and symmetric facial movement  CN VIII: Hearing is normal   CN IX, X: Palate elevates symmetrically  CN XI: Shoulder shrug strength is normal   CN XII: Tongue midline without atrophy or fasciculations  Motor   Normal muscle tone  Strength is 5/5 throughout all four extremities  Sensory  Light touch is normal in upper and lower extremities  Coordination  Right: Finger-to-nose normal  Rapid alternating movement normal   Left: Finger-to-nose normal  Heel-to-shin normal     Gait  Casual gait is normal including stance, stride, and arm swing  Able to rise from chair without using arms  ROS:    Review of Systems   Constitutional: Positive for fatigue  Negative for appetite change and fever  HENT: Positive for hearing loss  Negative for tinnitus, trouble swallowing and voice change  Eyes: Negative for photophobia and pain  Dry eyes     Respiratory: Negative  Negative for shortness of breath  Cardiovascular: Negative  Negative for palpitations  Gastrointestinal: Negative  Negative for nausea and vomiting  Endocrine: Negative for cold intolerance  Hair Loss   Erection Difficulties     Genitourinary: Negative for dysuria, frequency and urgency  Loss of bladder control     Musculoskeletal: Positive for back pain  Negative for myalgias and neck pain  Balance issues     Skin: Negative  Negative for rash  Allergic/Immunologic: Negative  Neurological: Positive for tremors (Both arms but Left is worse and wife states every once in a while will see his head Tremor)  Negative for dizziness, seizures, syncope, facial asymmetry, speech difficulty, weakness, light-headedness, numbness and headaches  Hematological: Negative  Does not bruise/bleed easily  Psychiatric/Behavioral: Negative for confusion, hallucinations and sleep disturbance          Memory Issues     All other systems reviewed and are negative  Review of system documented by the MA, was personally reviewed

## 2021-09-02 NOTE — PATIENT INSTRUCTIONS
Discussed MRI focued ultrasound versus deep brain stimulation surgery  Difficulty noted on MOCa testing  Given mild cognitive impairment , furhter workup is needed before considering surgical options  Will obtain MRI brain  Labs looking for potential underlying issues which may contribute to memory loss  Will also refer for neuropsychological testing to further understand the extent of cognitive impairment so we can better discuss potential risks versus benefits of surgery

## 2021-09-03 PROBLEM — G25.0 TREMOR, ESSENTIAL: Status: ACTIVE | Noted: 2021-09-03

## 2021-09-03 PROBLEM — G31.84 MILD COGNITIVE IMPAIRMENT: Status: ACTIVE | Noted: 2021-09-03

## 2021-09-03 NOTE — ASSESSMENT & PLAN NOTE
Progression of tremor now affecting activities of daily living  He has tried and failed multiple medications  Metoprolol is the only medication has provided partial relief but we are unable to increase medication given hypotension  and risk of bradycardia  Time spent  Answering questions regarding surgical options  He had questions regarding deep brain stimulation surgery and MRI focused ultrasound surgery  We discuss both procedures, potential benefits, and risks  Potential differences between both procedures were reviewed  He was noted on exam to have mild cognitive issues  He denies use affecting his normal activities of daily living  [place]  17/26, limited by his in about in ability to write  Due to tremor  Given mild cognitive impairment , furhter workup is needed before considering surgical options  Will obtain MRI brain  Labs looking for potential underlying issues which may contribute to memory loss    To include TSH, B12,  CBC and CMP performed within the past year reviewed  Will also refer for neuropsychological testing to further understand the extent of cognitive impairment so we can better discuss potential risks versus benefits of surgery

## 2021-09-13 ENCOUNTER — TELEPHONE (OUTPATIENT)
Dept: NEUROLOGY | Facility: CLINIC | Age: 76
End: 2021-09-13

## 2021-09-13 ENCOUNTER — APPOINTMENT (OUTPATIENT)
Dept: LAB | Facility: CLINIC | Age: 76
End: 2021-09-13
Payer: COMMERCIAL

## 2021-09-13 DIAGNOSIS — G31.84 MILD COGNITIVE IMPAIRMENT: ICD-10-CM

## 2021-09-13 DIAGNOSIS — G25.0 TREMOR, ESSENTIAL: ICD-10-CM

## 2021-09-13 LAB
TSH SERPL DL<=0.05 MIU/L-ACNC: 1.11 UIU/ML (ref 0.36–3.74)
VIT B12 SERPL-MCNC: 1713 PG/ML (ref 100–900)

## 2021-09-13 PROCEDURE — 84443 ASSAY THYROID STIM HORMONE: CPT

## 2021-09-13 PROCEDURE — 82607 VITAMIN B-12: CPT

## 2021-09-13 PROCEDURE — 36415 COLL VENOUS BLD VENIPUNCTURE: CPT

## 2021-09-14 NOTE — TELEPHONE ENCOUNTER
Neuropsychological Evaluation  [facility]  [address]  [address]  P: [phone] F: [phone]    Intake Note  Date of Referral to Neuropsychology: 9/2/2021  Referring Provider: Dr Nati Mckeon to conduct screening prior to scheduling neuropsychological evaluation  Spoke to patient directly to answer intake questions  Explained nature of neuropsychological evaluation  Patient is agreeable to evaluation  The following questions were answered  1 ) Does the patient have the ability to do a virtual intake? No    2 ) Does the patient have any problems that would limit his ability to participate in testing that requires interaction with another person, such as hearing or language impairments? No    3 ) Does the patient have any problems that would limit his ability to complete testing during one appointment that can last for more than 2 hours? (e g , severe fatigue, pain, or other debility) No    4 ) Does the patient have a preference between completing the evaluation in one session, or over the course of a few sessions? No    5 ) Does the patient have difficulties ambulating (e g , does he need a walker, cane, or wheelchair)? No    6 ) Would the patient be available for a last minute appointment if the opportunity arises? Yes, most likely    Referral will be reviewed by providers and we will call to schedule as appropriate  Referral is pending insurance review  Insurance company will be contacted to verify participation of providers and authorization/pre-certification for Neuropsychological evaluation  Patient has been made aware of this

## 2021-09-19 ENCOUNTER — HOSPITAL ENCOUNTER (OUTPATIENT)
Dept: MRI IMAGING | Facility: HOSPITAL | Age: 76
Discharge: HOME | End: 2021-09-19
Attending: PSYCHIATRY & NEUROLOGY
Payer: MEDICARE

## 2021-09-19 DIAGNOSIS — G25.0 TREMOR, ESSENTIAL: ICD-10-CM

## 2021-09-19 DIAGNOSIS — G31.84 MILD COGNITIVE IMPAIRMENT: ICD-10-CM

## 2021-09-19 PROCEDURE — 70551 MRI BRAIN STEM W/O DYE: CPT

## 2021-09-19 PROCEDURE — G1004 CDSM NDSC: HCPCS

## 2021-09-21 NOTE — TELEPHONE ENCOUNTER
Called to schedule patient for neuropsychological intake/testing   Scheduled for 11/26/2021 with Dr Morley Osler

## 2021-09-28 ENCOUNTER — OFFICE VISIT (OUTPATIENT)
Dept: CARDIOLOGY CLINIC | Facility: CLINIC | Age: 76
End: 2021-09-28
Payer: COMMERCIAL

## 2021-09-28 VITALS
BODY MASS INDEX: 22.67 KG/M2 | HEART RATE: 60 BPM | WEIGHT: 158 LBS | SYSTOLIC BLOOD PRESSURE: 130 MMHG | DIASTOLIC BLOOD PRESSURE: 68 MMHG

## 2021-09-28 DIAGNOSIS — E78.2 MIXED HYPERLIPIDEMIA: Chronic | ICD-10-CM

## 2021-09-28 DIAGNOSIS — I10 ESSENTIAL HYPERTENSION: ICD-10-CM

## 2021-09-28 DIAGNOSIS — I25.118 CORONARY ARTERY DISEASE OF NATIVE ARTERY OF NATIVE HEART WITH STABLE ANGINA PECTORIS (HCC): Primary | Chronic | ICD-10-CM

## 2021-09-28 DIAGNOSIS — I34.0 NON-RHEUMATIC MITRAL REGURGITATION: ICD-10-CM

## 2021-09-28 DIAGNOSIS — I45.2 BIFASCICULAR BUNDLE BRANCH BLOCK: ICD-10-CM

## 2021-09-28 PROCEDURE — 99214 OFFICE O/P EST MOD 30 MIN: CPT | Performed by: INTERNAL MEDICINE

## 2021-09-28 PROCEDURE — 93000 ELECTROCARDIOGRAM COMPLETE: CPT | Performed by: INTERNAL MEDICINE

## 2021-09-28 RX ORDER — NITROGLYCERIN 0.4 MG/1
0.4 TABLET SUBLINGUAL
Qty: 25 TABLET | Refills: 1 | Status: SHIPPED | OUTPATIENT
Start: 2021-09-28

## 2021-09-28 NOTE — PROGRESS NOTES
Cardiology Follow Up    Kira Henriquez  1945  [id number]  [address]  [address]  [facility] [address] [phone]    Reason for visit: 6 month FU for chronic angina, CAD s/p remote coronary artery bypass grafting with  Catheterization 2018   showing severe native circumflex disease not amenable to intervention (he had patent LIMA to LAD and SVG to the RCA was patent) , hypertension, hyperlipidemia, right bundle branch block/LAFB  and moderate mitral regurgitation      1  Coronary artery disease of native artery of native heart with stable angina pectoris ([facility] )     2  Essential hypertension  POCT ECG   3  Non-rheumatic mitral regurgitation     4  Bifascicular bundle branch block  POCT ECG   5   Mixed hyperlipidemia         Interval History:   Since the patient's last visit patient denies chest pain, shortness of breath, palpitations, edema or lightheadedness      Patient Active Problem List   Diagnosis    Leukocytosis    Chronic anemia    CAD (coronary artery disease)    Hyperlipidemia    Bifascicular bundle branch block    Trans[facility] bladder cancer ([facility] )    NSTEMI (non-ST elevated [facility] infarction) [facility]    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    Essential hypertension    Chronic kidney disease (CKD) stage G3a/A1, moderately decreased glomerular filtration rate (GFR) between 45-59 mL/min/1 73 square meter and albuminuria creatinine ratio less than 30 mg/g (HCC)    Stress incontinence of urine    Solitary kidney, acquired    Non-rheumatic mitral regurgitation    Intraepithelial carcinoma    Acute cystitis without hematuria    CIS (carcinoma in situ of bladder)    Bladder mass    Tremor, essential    Mild cognitive impairment     Past Medical History:   Diagnosis Date    Anesthesia     "can't urinate after surgery"    Basal cell carcinoma     left arm/ right eyelid/ right hand/ above right eyebrow in past    Bladder mass     Cataract     rosalind    Coronary artery disease     CABG X 2 2004    Essential tremor     of head and hands bilat    Hearing aid worn     bilat    History of kidney stones     History of pneumonia     childhood    History of prostate cancer     History of transfusion     2017    Hx of radiation therapy     2007 for after prostate surgery    Hyperlipidemia     Hypertension     Kidney stone     in past    [facility]ocardial infarction 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)     and" left kidney and left ureter removed" 2017- had chemo    Urinary incontinence     wears Depends    Urinary incontinence     Wears glasses     Wears glasses     Wears partial dentures     lower     Social History     Socioeconomic History    Marital status: /Civil Union     Spouse name: Not on file    Number of children: Not on file    Years of education: Not on file    Highest education level: Not on file   Occupational History    Not on file   Tobacco Use    Smoking status: Never Smoker    Smokeless tobacco: Never Used   Substance and Sexual Activity    Alcohol use: Yes     Comment: few x year    Drug use: No    Sexual activity: Not on file   Other Topics Concern    Not on file   Social History Narrative    Not on file     Social Determinants of Health     Financial Resource Strain:     Difficulty of Paying Living Expenses:    Food Insecurity:     Worried About Running Out of Food in the Last Year:     Ran Out of Food in the Last Year:    Transportation Needs:     Lack of Transportation (Medical):      Lack of Transportation (Non-Medical):    Physical Activity:     Days of Exercise per Week:     Minutes of Exercise per Session:    Stress:     Feeling of Stress : Social Connections:     Frequency of Communication with Friends and Family:     Frequency of Social Gatherings with Friends and Family:     Attends [employer] Services:     Active Member of Clubs or Organizations:     Attends Club or Organization Meetings:     Marital Status:    Intimate Partner Violence:     Fear of Current or Ex-Partner:     Emotionally Abused:     Physically Abused:     Sexually Abused:       Family History   Problem Relation Age of Onset    No Known Problems Mother     No Known Problems Father      Past Surgical History:   Procedure Laterality Date    APPENDECTOMY      BACK SURGERY      lumbar herniated disc repair    CARDIAC CATHETERIZATION      COLONOSCOPY      CORONARY ARTERY BYPASS GRAFT      x2 in 2004   [address] N/A 7/3/2017    Procedure: BLADDER BIOPSY;  Surgeon: Anthony Avalos MD;  [facility];  Service: Urology    CYSTOSCOPY W/ RETROGRADES Right 7/3/2017    Procedure: CYSTOSCOPY WITH RETROGRADE PYELOGRAM;  Surgeon: Anthony Avalos MD;  [facility];  Service: Urology    HERNIA REPAIR      groin    NEPHRECTOMY Left     and left ureter    MT CYSTOURETHROSCOPY,FULGUR <0 5 CM LESN Right 10/23/2018    Procedure: Brynn Jo, TURBT;  Surgeon: Anthony Avalos MD;  [facility];  Service: Urology    MT INSTILL ANTICANCER AGENT IN BLADDER N/A 10/23/2018    Procedure: INSTILLATION MITOMYCIN;  Surgeon: Anthony Avalos MD;  [facility];  Service: Urology    [address]     SHOULDER SURGERY      dislocation    SKIN CANCER EXCISION  2011    left arm and right eyebrow       Current Outpatient Medications:     aspirin 81 mg chewable tablet, Chew 81 mg daily Will stop 10/16 , Disp: , Rfl:     atorvastatin (LIPITOR) 20 mg tablet, Take 1 tablet (20 mg total) by mouth daily, Disp: 90 tablet, Rfl: 3    furosemide (LASIX) 20 mg tablet, Take 20 mg by mouth as needed , Disp: , Rfl:    isosorbide mononitrate (IMDUR) 30 mg 24 hr tablet, Take 1 tablet (30 mg total) by mouth daily, Disp: 90 tablet, Rfl: 3    metoprolol tartrate (LOPRESSOR) 25 mg tablet, Take 1 tablet (25 mg total) by mouth every 12 (twelve) hours, Disp: 180 tablet, Rfl: 3    Multiple Vitamin (MULTIVITAMIN) tablet, Take 1 tablet by mouth daily, Disp: , Rfl:     Multiple Vitamins-Minerals (PRESERVISION AREDS 2 PO), Take by mouth, Disp: , Rfl:     nitroglycerin (NITROSTAT) 0 4 mg SL tablet, Place 1 tablet (0 4 mg total) under the tongue every 5 (five) minutes as needed for chest pain, Disp: 25 tablet, Rfl: 5    ranolazine (RANEXA) 500 mg 12 hr tablet, Take 1 tablet (500 mg total) by mouth 2 (two) times a day, Disp: 180 tablet, Rfl: 3    Cyanocobalamin (B-12) 1000 MCG CAPS, Take by mouth daily (Patient not taking: Reported on 9/28/2021), Disp: , Rfl:     cycloSPORINE (RESTASIS) 0 05 % ophthalmic emulsion, Apply 1 drop to eye 2 (two) times a day (Patient not taking: Reported on 9/2/2021), Disp: , Rfl:   Allergies   Allergen Reactions    Levaquin [Levofloxacin] Rash    Percocet [Oxycodone-Acetaminophen] GI Intolerance     Constipation,,,happens with narcotics like percocet    Codeine Other (See Comments)    Lovastatin Other (See Comments)     myopathy         Review of Systems:  Review of Systems   Constitutional: Positive for fatigue  Negative for activity change, appetite change and unexpected weight change  Respiratory: Positive for chest tightness  Negative for cough, shortness of breath and wheezing  Gastrointestinal: Negative for abdominal pain, blood in stool, constipation and diarrhea  Genitourinary: Positive for frequency (incontinence)  Negative for hematuria  Musculoskeletal: Positive for back pain  Negative for arthralgias, gait problem and joint swelling  Neurological: Positive for numbness  Negative for dizziness, speech difficulty, light-headedness and headaches     Psychiatric/Behavioral: Negative for agitation, behavioral problems, confusion and decreased concentration  Physical Exam:  Vitals:    09/28/21 0937   BP: 130/68   BP Location: Right arm   Patient Position: Sitting   Cuff Size: Adult   Pulse: 60   Weight: 71 7 kg (158 lb)       Physical Exam  Constitutional:       Appearance: He is obese  HENT:      Head: Normocephalic and atraumatic  Mouth/Throat:      Mouth: Mucous membranes are moist       Pharynx: No oropharyngeal exudate or posterior oropharyngeal erythema  Eyes:      General: No scleral icterus  Conjunctiva/sclera: Conjunctivae normal    Neck:      Thyroid: No thyroid mass or thyromegaly  Vascular: Normal carotid pulses  No carotid bruit or JVD  Cardiovascular:      Rate and Rhythm: Normal rate and regular rhythm  Pulses:           Dorsalis pedis pulses are 0 on the right side and 0 on the left side  Posterior tibial pulses are 0 on the right side and 0 on the left side  Heart sounds: Murmur heard  Systolic (apical) murmur is present with a grade of 1/6  No diastolic murmur is present  No friction rub  No gallop  Pulmonary:      Breath sounds: Decreased breath sounds present  No wheezing, rhonchi or rales  Abdominal:      Palpations: Abdomen is soft  There is no hepatomegaly, splenomegaly or mass  Tenderness: There is no abdominal tenderness  Musculoskeletal:         General: Deformity (kyphosis) present  No swelling  Cervical back: Neck supple  Skin:     General: Skin is warm  Coloration: Skin is not jaundiced or pale  Findings: No bruising, erythema, lesion or rash  Neurological:      General: No focal deficit present  Mental Status: He is alert and oriented to person, place, and time  Cranial Nerves: No cranial nerve deficit  Sensory: No sensory deficit  Motor: No weakness  Psychiatric:         Mood and Affect: Mood normal          Behavior: Behavior normal          Thought Content:  Thought content normal          Judgment: Judgment normal          Discussion/Summary:  1  CAD status post remote CABG in 2004 with cardiac catheterization showing patent left internal mammary artery to left anterior descending artery graft and a patent saphenous vein graft to the right coronary artery in 2018  The occluded circumflex was responsible for his angina at that time and he was very anemic at that time due to chemotherapy  Patient on isosorbide mononitrate 30 mg daily, metoprolol 25 twice daily and ranolazine 5 mg b i d  As well as aspirin 81 mg daily  Having no angina  Continue same regimen  2  Hypertension  Blood pressure excellent on isosorbide and metoprolol  3  Mitral regurgitation  Moderate on echo in 2018  Fairly unimpressive on exam  4  Bifascicular bundle branch block  Heart rate today 61 beats per minute  No symptoms to suggest symptomatic bradycardia  5  Hyperlipidemia  Patient on atorvastatin 20 mg daily  LDL cholesterol earlier this year 55 with HDL cholesterol 35 and triglycerides 333      FU 6 months      Perfecto Pizarro MD

## 2021-10-08 ENCOUNTER — DOCUMENTATION (OUTPATIENT)
Dept: NEUROLOGY | Facility: CLINIC | Age: 76
End: 2021-10-08

## 2021-10-15 ENCOUNTER — OFFICE VISIT (OUTPATIENT)
Dept: NEUROLOGY | Facility: CLINIC | Age: 76
End: 2021-10-15

## 2021-10-15 DIAGNOSIS — G31.84 MILD COGNITIVE IMPAIRMENT: Primary | ICD-10-CM

## 2021-10-15 DIAGNOSIS — G25.0 TREMOR, ESSENTIAL: ICD-10-CM

## 2021-10-15 PROCEDURE — NC001 PR NO CHARGE

## 2021-10-15 PROCEDURE — NC001 PR NO CHARGE: Performed by: CLINICAL NEUROPSYCHOLOGIST

## 2021-11-09 ENCOUNTER — OFFICE VISIT (OUTPATIENT)
Dept: NEUROLOGY | Facility: CLINIC | Age: 76
End: 2021-11-09
Payer: MEDICARE

## 2021-11-09 DIAGNOSIS — G31.84 MILD COGNITIVE IMPAIRMENT: Primary | ICD-10-CM

## 2021-11-09 DIAGNOSIS — G25.0 TREMOR, ESSENTIAL: ICD-10-CM

## 2021-11-09 PROCEDURE — 96133 NRPSYC TST EVAL PHYS/QHP EA: CPT | Performed by: CLINICAL NEUROPSYCHOLOGIST

## 2021-11-09 PROCEDURE — 96132 NRPSYC TST EVAL PHYS/QHP 1ST: CPT | Performed by: CLINICAL NEUROPSYCHOLOGIST

## 2021-11-09 PROCEDURE — 96139 PSYCL/NRPSYC TST TECH EA: CPT | Performed by: CLINICAL NEUROPSYCHOLOGIST

## 2021-11-09 PROCEDURE — 96138 PSYCL/NRPSYC TECH 1ST: CPT | Performed by: CLINICAL NEUROPSYCHOLOGIST

## 2021-11-09 PROCEDURE — 96116 NUBHVL XM PHYS/QHP 1ST HR: CPT | Performed by: CLINICAL NEUROPSYCHOLOGIST

## 2021-11-29 ENCOUNTER — TELEPHONE (OUTPATIENT)
Dept: CARDIOLOGY CLINIC | Facility: CLINIC | Age: 76
End: 2021-11-29

## 2022-01-07 ENCOUNTER — TELEPHONE (OUTPATIENT)
Dept: CARDIOLOGY CLINIC | Facility: CLINIC | Age: 77
End: 2022-01-07

## 2022-01-07 NOTE — TELEPHONE ENCOUNTER
Called wife    not on metformin for a few weeks  Jacqueline Contreras eating better and hydrating    having fatigue and positional lightheadedness    stop isosorbide until he sees me Monday Monjs Contreras  apparently PCP has done no blood work including CBC or TSH

## 2022-01-07 NOTE — TELEPHONE ENCOUNTER
pts wife called saying lon needs to see dr Simone Rg because he is very tired,dizzy and loss of appetite  I asked about what the pcp said and she said they just saw him and took him off metformin because he lost 7 lbs in 2 weeks because of being on it  They didn't discuss his fatigue or dizzyness  She insisted making an appt to see you

## 2022-01-09 PROBLEM — R53.83 FATIGUE: Status: ACTIVE | Noted: 2022-01-09

## 2022-01-10 ENCOUNTER — OFFICE VISIT (OUTPATIENT)
Dept: CARDIOLOGY CLINIC | Facility: CLINIC | Age: 77
End: 2022-01-10
Payer: COMMERCIAL

## 2022-01-10 VITALS
HEART RATE: 62 BPM | SYSTOLIC BLOOD PRESSURE: 138 MMHG | DIASTOLIC BLOOD PRESSURE: 68 MMHG | BODY MASS INDEX: 21.33 KG/M2 | HEIGHT: 70 IN | WEIGHT: 149 LBS

## 2022-01-10 DIAGNOSIS — I34.0 NON-RHEUMATIC MITRAL REGURGITATION: ICD-10-CM

## 2022-01-10 DIAGNOSIS — I10 ESSENTIAL HYPERTENSION: ICD-10-CM

## 2022-01-10 DIAGNOSIS — R06.00 DOE (DYSPNEA ON EXERTION): ICD-10-CM

## 2022-01-10 DIAGNOSIS — I25.118 CORONARY ARTERY DISEASE OF NATIVE ARTERY OF NATIVE HEART WITH STABLE ANGINA PECTORIS (HCC): Primary | Chronic | ICD-10-CM

## 2022-01-10 DIAGNOSIS — E78.2 MIXED HYPERLIPIDEMIA: Chronic | ICD-10-CM

## 2022-01-10 DIAGNOSIS — R53.83 FATIGUE, UNSPECIFIED TYPE: ICD-10-CM

## 2022-01-10 DIAGNOSIS — I45.2 BIFASCICULAR BUNDLE BRANCH BLOCK: ICD-10-CM

## 2022-01-10 PROCEDURE — 99214 OFFICE O/P EST MOD 30 MIN: CPT | Performed by: INTERNAL MEDICINE

## 2022-01-10 NOTE — PROGRESS NOTES
Cardiology Follow Up    Shaye Sparks  1945  [id number]  [address] [facility] [id number] [phone]    Reason for visit:  Patient here ahead of  time due to fatigue and weight loss  Has known CAD status post remote coronary artery bypass grafting with cardiac catheterization in 2018 showing severe native disease not amenable to intervention  At that time he had a left internal mammary artery to left anterior descending artery and saphenous vein graft to the right coronary artery which were patent  The native circumflex was the cause of his angina (also anemic at that time)  He also has hypertension, hyperlipidemia, right bundle branch block with left anterior hemiblock and moderate mitral regurgitation  1  Coronary artery disease of native artery of native heart with stable angina pectoris ([facility] )     2  Essential hypertension     3  Non-rheumatic mitral regurgitation     4  Bifascicular bundle branch block     5  Mixed hyperlipidemia     6  Fatigue, unspecified type         Interval History: The patient presents with wt loss and fatigue for a few months    He has lost 9 lbs in a bit over 3 months by our scales  Alvin Arnold He is also having dizziness  His appetite has been subpar  He was put on metformin and it was worse at that time  Metformin was stopped almost one month ago and the appetite is better but the fatigue has persisted  Alvin Arnold He denies chest pain  He has had some exertional SOB  We stopped his nitrates on Friday  His BP is now a bit elevated having been low normal  He denies edema  He has been having positional dizziness  He denies palpitations       Patient Active Problem List   Diagnosis    Leukocytosis    Chronic anemia    CAD (coronary artery disease)    Hyperlipidemia    Bifascicular bundle branch block    Transitional cell bladder cancer ([facility] )    [facility]STEM[facility]non-ST elevated [facility]) [facility]    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    Essential hypertension    Chronic kidney disease (CKD) stage G3a/A1, moderately decreased glomerular filtration rate (GFR) between 45-59 mL/min/1 73 square meter and albuminuria creatinine ratio less than 30 mg/g (HCC)    Stress incontinence of urine    Solitary kidney, acquired    Non-rheumatic mitral regurgitation    Intraepithelial carcinoma    Acute cystitis without hematuria    CIS (carcinoma in situ of bladder)    Bladder mass    Tremor, essential    Mild cognitive impairment    Fatigue     Past Medical History:   Diagnosis Date    Anesthesia     "can't urinate after surgery"    Basal cell carcinoma     left arm/ right eyelid/ right hand/ above right eyebrow in past    Bladder mass     Cataract     rosalind    Coronary artery disease     CABG X 2 2004    Essential tremor     of head and hands bilat    Hearing aid worn     bilat    History of kidney stones     History of pneumonia     childhood    History of prostate cancer     History of transfusion     2017    Hx of radiation therapy     2007 for after prostate surgery    Hyperlipidemia     Hypertension     Kidney stone     in past    [facility]     [facility] ([facility]     2002- had prostatectomy    Scab     black scab right thumb area- biopsy taken by md recently- bandaid over area    Trans[facility] cell [facility] ([facility]     had BCG    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 ([facility] )     and" left kidney and left ureter removed" 2017- had chemo    Urinary incontinence     wears Depends    Urinary incontinence     Wears glasses     Wears glasses     Wears partial dentures     lower     Social History     Socioeconomic History    Marital status: /Civil Union     Spouse name: Not on file    Number of children: Not on file    Years of education: Not on file    Highest education level: Not on file   Occupational History    Not on file Tobacco Use    Smoking status: Never Smoker    Smokeless tobacco: Never Used   Substance and Sexual Activity    Alcohol use: Yes     Comment: few x year    Drug use: No    Sexual activity: Not on file   Other Topics Concern    Not on file   Social History Narrative    Not on file     Social Determinants of Health     Financial Resource Strain: Not on file   Food Insecurity: Not on file   Transportation Needs: Not on file   Physical Activity: Not on file   Stress: Not on file   Social Connections: Not on file   Intimate Partner Violence: Not on file   Housing Stability: Not on file      Family History   Problem Relation Age of Onset    No Known Problems Mother     No Known Problems Father      Past Surgical History:   Procedure Laterality Date    APPENDECTOMY      BACK SURGERY      lumbar herniated disc repair    CARDIAC CATHETERIZATION      COLONOSCOPY      CORONARY ARTERY BYPASS GRAFT      x2 in 2004   [address] 7/3/2017    Procedure: BLADDER BIOPSY;  Surgeon: Fabien Louis MD;  [facility];  Service: Urology    CYSTOSCOPY W/ RETROGRADES Right 7/3/2017    Procedure: CYSTOSCOPY WITH RETROGRADE PYELOGRAM;  Surgeon: Fabien Louis MD;  [facility];  Service: Urology    HERNIA REPAIR      groin    NEPHRECTOMY Left     and left ureter    OH CYSTOURETHROSCOPY,FULGUR <0 5 CM LESN Right 10/23/2018    Procedure: CYSTO, RETROGRADE, TURBT;  Surgeon: Fabien Louis MD;  [facility];  Service: Urology    OH INSTILL ANTICANCER AGENT IN BLADDER N/A 10/23/2018    Procedure: INSTILLATION MITOMYCIN;  Surgeon: Fabien Louis MD;  [facility];  Service: Urology    [address] Left     SHOULDER SURGERY      dislocation    SKIN CANCER EXCISION  2011    left arm and right eyebrow       Current Outpatient Medications:     aspirin 81 mg chewable tablet, Chew 81 mg daily Will stop 10/16 , Disp: , Rfl:     atorvastatin (LIPITOR) 20 mg tablet, Take 1 tablet (20 mg total) by mouth daily, Disp: 90 tablet, Rfl: 3    cycloSPORINE (RESTASIS) 0 05 % ophthalmic emulsion, Apply 1 drop to eye 2 (two) times a day  , Disp: , Rfl:     metoprolol tartrate (LOPRESSOR) 25 mg tablet, Take 1 tablet (25 mg total) by mouth every 12 (twelve) hours, Disp: 180 tablet, Rfl: 3    Multiple Vitamin (MULTIVITAMIN) tablet, Take 1 tablet by mouth daily, Disp: , Rfl:     Multiple Vitamins-Minerals (PRESERVISION AREDS 2 PO), Take by mouth, Disp: , Rfl:     ranolazine (RANEXA) 500 mg 12 hr tablet, Take 1 tablet (500 mg total) by mouth 2 (two) times a day, Disp: 180 tablet, Rfl: 3    Cyanocobalamin (B-12) 1000 MCG CAPS, Take by mouth daily (Patient not taking: Reported on 9/28/2021), Disp: , Rfl:     furosemide (LASIX) 20 mg tablet, Take 20 mg by mouth as needed  (Patient not taking: Reported on 1/10/2022 ), Disp: , Rfl:     isosorbide mononitrate (IMDUR) 30 mg 24 hr tablet, Take 1 tablet (30 mg total) by mouth daily (Patient not taking: Reported on 1/10/2022 ), Disp: 90 tablet, Rfl: 3    nitroglycerin (NITROSTAT) 0 4 mg SL tablet, Place 1 tablet (0 4 mg total) under the tongue every 5 (five) minutes as needed for chest pain (Patient not taking: Reported on 1/10/2022 ), Disp: 25 tablet, Rfl: 1  Allergies   Allergen Reactions    Levaquin [Levofloxacin] Rash    Percocet [Oxycodone-Acetaminophen] GI Intolerance     Constipation,,,happens with narcotics like percocet    Codeine Other (See Comments)    Lovastatin Other (See Comments)     myopathy       Review of Systems:  Review of Systems   Constitutional: Positive for appetite change, fatigue and unexpected weight change  Negative for activity change  Respiratory: Positive for shortness of breath  Negative for cough, chest tightness and wheezing  Cardiovascular: Negative for chest pain, palpitations and leg swelling  Gastrointestinal: Negative for abdominal pain, blood in stool, constipation and diarrhea  Genitourinary: Positive for frequency (incontinence)  Negative for dysuria, hematuria and urgency  Musculoskeletal: Positive for back pain and gait problem  Negative for arthralgias and joint swelling  Neurological: Positive for dizziness, light-headedness and numbness (right hand  Elizabeth Bird )  Psychiatric/Behavioral: Negative for agitation, behavioral problems, confusion and decreased concentration  Physical Exam:  Vitals:    01/10/22 1455   BP: 138/68   Pulse: 62   Weight: 67 6 kg (149 lb)   Height: 5' 10" (1 778 m)       Physical Exam  Constitutional:       General: He is not in acute distress  Appearance: He is not ill-appearing  HENT:      Head: Normocephalic and atraumatic  Mouth/Throat:      Mouth: Mucous membranes are moist       Pharynx: No oropharyngeal exudate  Eyes:      General: No scleral icterus  Conjunctiva/sclera: Conjunctivae normal    Neck:      Thyroid: No thyroid mass or thyromegaly  Vascular: Normal carotid pulses  No carotid bruit or JVD  Cardiovascular:      Rate and Rhythm: Normal rate  Occasional extrasystoles are present  Heart sounds: No murmur heard  No friction rub  No gallop  Pulmonary:      Breath sounds: Decreased breath sounds present  No wheezing, rhonchi or rales  Abdominal:      Palpations: Abdomen is soft  There is no hepatomegaly, splenomegaly or mass  Tenderness: There is no abdominal tenderness  Musculoskeletal:         General: Deformity (kyphosis) present  No swelling  Cervical back: Neck supple  Skin:     General: Skin is warm  Coloration: Skin is not jaundiced or pale  Findings: No bruising, erythema, lesion or rash  Neurological:      General: No focal deficit present  Mental Status: He is alert and oriented to person, place, and time  Cranial Nerves: No cranial nerve deficit  Sensory: No sensory deficit  Motor: No weakness     Psychiatric:         Mood and Affect: Mood normal  Behavior: Behavior normal          Thought Content: Thought content normal          Judgment: Judgment normal          Discussion/Summary:  1  Coronary disease with stable angina pectoris  Patient without angina currently on low-dose aspirin, metoprolol 25 mg b i d  And Ranexa 500 mg b i d   Isosorbide was stopped since his blood pressure was low normal and he was having dizziness  Fortunately he is not having any angina office will keep him off of this for now  2  Hypertension  Blood pressure a bit elevated at home off of isosorbide however today was fine on regimen that includes chest metoprolol  Continue same  3  Mitral regurgitation  Moderate on echo in 2018  Not audible on exam today  Needs repeat echo in light of exertional shortness of breath and exertional fatigue  4  Bifascicular bundle branch block  No evidence for bradycardia arrhythmias  5  Hyperlipidemia  Patient on atorvastatin 20 mg daily  LDL cholesterol 53 with HDL cholesterol 33 and triglycerides 218 when checked in November  6  Fatigue as well as some dyspnea on exertion and weight loss  Doubt this is of a cardiac etiology  Will check echocardiogram   Will check CBC, TSH, CMP probnp and chest x-ray at this time  Did encourage him to follow-up with Hematology/Oncology in light of weight loss and known kidney and prostate cancer  7  APARICIO    check XR, probnp and echo    FU by phone re results      Asia Ledesma MD

## 2022-01-14 ENCOUNTER — APPOINTMENT (OUTPATIENT)
Dept: RADIOLOGY | Facility: MEDICAL CENTER | Age: 77
End: 2022-01-14
Payer: COMMERCIAL

## 2022-01-14 ENCOUNTER — APPOINTMENT (OUTPATIENT)
Dept: LAB | Facility: MEDICAL CENTER | Age: 77
End: 2022-01-14
Payer: COMMERCIAL

## 2022-01-14 DIAGNOSIS — R53.83 FATIGUE, UNSPECIFIED TYPE: ICD-10-CM

## 2022-01-14 DIAGNOSIS — R06.00 DOE (DYSPNEA ON EXERTION): ICD-10-CM

## 2022-01-14 DIAGNOSIS — I10 ESSENTIAL HYPERTENSION: ICD-10-CM

## 2022-01-14 LAB
ALBUMIN SERPL BCP-MCNC: 3.3 G/DL (ref 3.5–5)
ALP SERPL-CCNC: 81 U/L (ref 46–116)
ALT SERPL W P-5'-P-CCNC: 30 U/L (ref 12–78)
ANION GAP SERPL CALCULATED.3IONS-SCNC: 5 MMOL/L (ref 4–13)
AST SERPL W P-5'-P-CCNC: 15 U/L (ref 5–45)
BILIRUB SERPL-MCNC: 0.88 MG/DL (ref 0.2–1)
BUN SERPL-MCNC: 25 MG/DL (ref 5–25)
CALCIUM ALBUM COR SERPL-MCNC: 10.9 MG/DL (ref 8.3–10.1)
CALCIUM SERPL-MCNC: 10.3 MG/DL (ref 8.3–10.1)
CHLORIDE SERPL-SCNC: 102 MMOL/L (ref 100–108)
CO2 SERPL-SCNC: 30 MMOL/L (ref 21–32)
CREAT SERPL-MCNC: 1.35 MG/DL (ref 0.6–1.3)
ERYTHROCYTE [DISTWIDTH] IN BLOOD BY AUTOMATED COUNT: 15.9 % (ref 11.6–15.1)
GFR SERPL CREATININE-BSD FRML MDRD: 49 ML/MIN/1.73SQ M
GLUCOSE P FAST SERPL-MCNC: 138 MG/DL (ref 65–99)
HCT VFR BLD AUTO: 40.4 % (ref 36.5–49.3)
HGB BLD-MCNC: 12.5 G/DL (ref 12–17)
MCH RBC QN AUTO: 28.5 PG (ref 26.8–34.3)
MCHC RBC AUTO-ENTMCNC: 30.9 G/DL (ref 31.4–37.4)
MCV RBC AUTO: 92 FL (ref 82–98)
NT-PROBNP SERPL-MCNC: 419 PG/ML
PLATELET # BLD AUTO: 267 THOUSANDS/UL (ref 149–390)
PMV BLD AUTO: 10.7 FL (ref 8.9–12.7)
POTASSIUM SERPL-SCNC: 4.7 MMOL/L (ref 3.5–5.3)
PROT SERPL-MCNC: 8.3 G/DL (ref 6.4–8.2)
RBC # BLD AUTO: 4.39 MILLION/UL (ref 3.88–5.62)
SODIUM SERPL-SCNC: 137 MMOL/L (ref 136–145)
TSH SERPL DL<=0.05 MIU/L-ACNC: 0.79 UIU/ML (ref 0.36–3.74)
WBC # BLD AUTO: 7.39 THOUSAND/UL (ref 4.31–10.16)

## 2022-01-14 PROCEDURE — 85027 COMPLETE CBC AUTOMATED: CPT

## 2022-01-14 PROCEDURE — 84443 ASSAY THYROID STIM HORMONE: CPT | Performed by: INTERNAL MEDICINE

## 2022-01-14 PROCEDURE — 71046 X-RAY EXAM CHEST 2 VIEWS: CPT

## 2022-01-14 PROCEDURE — 80053 COMPREHEN METABOLIC PANEL: CPT

## 2022-01-14 PROCEDURE — 36415 COLL VENOUS BLD VENIPUNCTURE: CPT

## 2022-01-14 PROCEDURE — 83880 ASSAY OF NATRIURETIC PEPTIDE: CPT

## 2022-01-31 DIAGNOSIS — I25.118 CORONARY ARTERY DISEASE OF NATIVE ARTERY OF NATIVE HEART WITH STABLE ANGINA PECTORIS (HCC): Chronic | ICD-10-CM

## 2022-03-01 DIAGNOSIS — E78.2 MIXED HYPERLIPIDEMIA: Chronic | ICD-10-CM

## 2022-03-02 RX ORDER — ATORVASTATIN CALCIUM 20 MG/1
20 TABLET, FILM COATED ORAL DAILY
Qty: 90 TABLET | Refills: 3 | Status: SHIPPED | OUTPATIENT
Start: 2022-03-02

## 2022-03-09 ENCOUNTER — HOSPITAL ENCOUNTER (OUTPATIENT)
Dept: NON INVASIVE DIAGNOSTICS | Facility: HOSPITAL | Age: 77
Discharge: HOME | DRG: 393 | End: 2022-03-09
Attending: INTERNAL MEDICINE
Payer: COMMERCIAL

## 2022-03-09 VITALS
DIASTOLIC BLOOD PRESSURE: 68 MMHG | WEIGHT: 149 LBS | HEART RATE: 65 BPM | SYSTOLIC BLOOD PRESSURE: 138 MMHG | BODY MASS INDEX: 21.33 KG/M2 | HEIGHT: 70 IN

## 2022-03-09 DIAGNOSIS — R53.83 FATIGUE, UNSPECIFIED TYPE: ICD-10-CM

## 2022-03-09 DIAGNOSIS — R06.00 DOE (DYSPNEA ON EXERTION): ICD-10-CM

## 2022-03-09 DIAGNOSIS — I25.118 CORONARY ARTERY DISEASE OF NATIVE ARTERY OF NATIVE HEART WITH STABLE ANGINA PECTORIS (HCC): Chronic | ICD-10-CM

## 2022-03-09 LAB
AORTIC ROOT: 3.9 CM
APICAL FOUR CHAMBER EJECTION FRACTION: 52 %
E WAVE DECELERATION TIME: 165 MS
FRACTIONAL SHORTENING: 33 % (ref 28–44)
INTERVENTRICULAR SEPTUM IN DIASTOLE (PARASTERNAL SHORT AXIS VIEW): 1.3 CM
INTERVENTRICULAR SEPTUM: 1.3 CM (ref 0.51–0.96)
LEFT ATRIUM AREA SYSTOLE SINGLE PLANE A4C: 20.8 CM2
LEFT ATRIUM SIZE: 4.4 CM
LEFT INTERNAL DIMENSION IN SYSTOLE: 3 CM (ref 2.53–3.83)
LEFT VENTRICULAR INTERNAL DIMENSION IN DIASTOLE: 4.5 CM (ref 4.13–6.15)
LEFT VENTRICULAR POSTERIOR WALL IN END DIASTOLE: 1.4 CM (ref 0.5–0.95)
LEFT VENTRICULAR STROKE VOLUME: 58 ML
LVSV (TEICH): 58 ML
MV E'TISSUE VEL-SEP: 6 CM/S
MV PEAK A VEL: 0.53 M/S
MV PEAK E VEL: 46 CM/S
MV STENOSIS PRESSURE HALF TIME: 48 MS
MV VALVE AREA P 1/2 METHOD: 4.58 CM2
PA SYSTOLIC PRESSURE: 20 MMHG
RIGHT ATRIUM AREA SYSTOLE A4C: 17.8 CM2
RIGHT VENTRICLE ID DIMENSION: 4.6 CM
SL CV LV EF: 52
SL CV PED ECHO LEFT VENTRICLE DIASTOLIC VOLUME (MOD BIPLANE) 2D: 94 ML
SL CV PED ECHO LEFT VENTRICLE SYSTOLIC VOLUME (MOD BIPLANE) 2D: 36 ML
TR MAX PG: 22 MMHG
TR PEAK VELOCITY: 2.3 M/S
TRICUSPID VALVE PEAK REGURGITATION VELOCITY: 2.33 M/S
Z-SCORE OF INTERVENTRICULAR SEPTUM IN END DIASTOLE: 4.92
Z-SCORE OF LEFT VENTRICULAR DIMENSION IN END DIASTOLE: -1.13
Z-SCORE OF LEFT VENTRICULAR DIMENSION IN END SYSTOLE: -0.29
Z-SCORE OF LEFT VENTRICULAR POSTERIOR WALL IN END DIASTOLE: 5.91

## 2022-03-09 PROCEDURE — 93306 TTE W/DOPPLER COMPLETE: CPT | Performed by: INTERNAL MEDICINE

## 2022-03-09 PROCEDURE — 93306 TTE W/DOPPLER COMPLETE: CPT

## 2022-03-11 ENCOUNTER — APPOINTMENT (EMERGENCY)
Dept: CT IMAGING | Facility: HOSPITAL | Age: 77
DRG: 393 | End: 2022-03-11
Payer: COMMERCIAL

## 2022-03-11 ENCOUNTER — HOSPITAL ENCOUNTER (INPATIENT)
Facility: HOSPITAL | Age: 77
LOS: 2 days | Discharge: HOME | DRG: 393 | End: 2022-03-13
Attending: EMERGENCY MEDICINE | Admitting: HOSPITALIST
Payer: COMMERCIAL

## 2022-03-11 ENCOUNTER — APPOINTMENT (EMERGENCY)
Dept: RADIOLOGY | Facility: HOSPITAL | Age: 77
DRG: 393 | End: 2022-03-11
Payer: COMMERCIAL

## 2022-03-11 DIAGNOSIS — K85.90 ACUTE PANCREATITIS: Primary | ICD-10-CM

## 2022-03-11 DIAGNOSIS — R07.9 CHEST PAIN: ICD-10-CM

## 2022-03-11 PROBLEM — E11.9 DIABETES MELLITUS (HCC): Status: ACTIVE | Noted: 2022-03-11

## 2022-03-11 PROBLEM — K91.89 ACUTE PANCREATITIS AFTER ENDOSCOPIC RETROGRADE CHOLANGIOPANCREATOGRAPHY (ERCP): Status: ACTIVE | Noted: 2022-03-11

## 2022-03-11 LAB
2HR DELTA HS TROPONIN: 0 NG/L
4HR DELTA HS TROPONIN: 3 NG/L
ALBUMIN SERPL BCP-MCNC: 3.1 G/DL (ref 3.5–5)
ALP SERPL-CCNC: 86 U/L (ref 46–116)
ALT SERPL W P-5'-P-CCNC: 34 U/L (ref 12–78)
ANION GAP SERPL CALCULATED.3IONS-SCNC: 9 MMOL/L (ref 4–13)
AST SERPL W P-5'-P-CCNC: 20 U/L (ref 5–45)
ATRIAL RATE: 75 BPM
BASOPHILS # BLD AUTO: 0.02 THOUSANDS/ΜL (ref 0–0.1)
BASOPHILS NFR BLD AUTO: 0 % (ref 0–1)
BILIRUB DIRECT SERPL-MCNC: 0.12 MG/DL (ref 0–0.2)
BILIRUB SERPL-MCNC: 0.35 MG/DL (ref 0.2–1)
BUN SERPL-MCNC: 21 MG/DL (ref 5–25)
CALCIUM SERPL-MCNC: 8.6 MG/DL (ref 8.3–10.1)
CARDIAC TROPONIN I PNL SERPL HS: 10 NG/L
CARDIAC TROPONIN I PNL SERPL HS: 7 NG/L
CARDIAC TROPONIN I PNL SERPL HS: 7 NG/L
CHLORIDE SERPL-SCNC: 102 MMOL/L (ref 100–108)
CO2 SERPL-SCNC: 28 MMOL/L (ref 21–32)
CREAT SERPL-MCNC: 1.22 MG/DL (ref 0.6–1.3)
EOSINOPHIL # BLD AUTO: 0.03 THOUSAND/ΜL (ref 0–0.61)
EOSINOPHIL NFR BLD AUTO: 0 % (ref 0–6)
ERYTHROCYTE [DISTWIDTH] IN BLOOD BY AUTOMATED COUNT: 17.2 % (ref 11.6–15.1)
GFR SERPL CREATININE-BSD FRML MDRD: 56 ML/MIN/1.73SQ M
GLUCOSE SERPL-MCNC: 172 MG/DL (ref 65–140)
HCT VFR BLD AUTO: 37.2 % (ref 36.5–49.3)
HGB BLD-MCNC: 12.1 G/DL (ref 12–17)
IMM GRANULOCYTES # BLD AUTO: 0.1 THOUSAND/UL (ref 0–0.2)
IMM GRANULOCYTES NFR BLD AUTO: 1 % (ref 0–2)
LIPASE SERPL-CCNC: ABNORMAL U/L (ref 73–393)
LYMPHOCYTES # BLD AUTO: 0.63 THOUSANDS/ΜL (ref 0.6–4.47)
LYMPHOCYTES NFR BLD AUTO: 6 % (ref 14–44)
MCH RBC QN AUTO: 28.9 PG (ref 26.8–34.3)
MCHC RBC AUTO-ENTMCNC: 32.5 G/DL (ref 31.4–37.4)
MCV RBC AUTO: 89 FL (ref 82–98)
MONOCYTES # BLD AUTO: 0.63 THOUSAND/ΜL (ref 0.17–1.22)
MONOCYTES NFR BLD AUTO: 6 % (ref 4–12)
NEUTROPHILS # BLD AUTO: 8.42 THOUSANDS/ΜL (ref 1.85–7.62)
NEUTS SEG NFR BLD AUTO: 87 % (ref 43–75)
NRBC BLD AUTO-RTO: 0 /100 WBCS
PLATELET # BLD AUTO: 245 THOUSANDS/UL (ref 149–390)
PLATELET # BLD AUTO: 258 THOUSANDS/UL (ref 149–390)
PMV BLD AUTO: 10 FL (ref 8.9–12.7)
PMV BLD AUTO: 10.2 FL (ref 8.9–12.7)
POTASSIUM SERPL-SCNC: 4.3 MMOL/L (ref 3.5–5.3)
PROT SERPL-MCNC: 7.7 G/DL (ref 6.4–8.2)
QRS AXIS: -32 DEGREES
QRSD INTERVAL: 158 MS
QT INTERVAL: 416 MS
QTC INTERVAL: 508 MS
RBC # BLD AUTO: 4.18 MILLION/UL (ref 3.88–5.62)
SODIUM SERPL-SCNC: 139 MMOL/L (ref 136–145)
T WAVE AXIS: 48 DEGREES
VENTRICULAR RATE: 90 BPM
WBC # BLD AUTO: 9.83 THOUSAND/UL (ref 4.31–10.16)

## 2022-03-11 PROCEDURE — 93010 ELECTROCARDIOGRAM REPORT: CPT | Performed by: INTERNAL MEDICINE

## 2022-03-11 PROCEDURE — 80048 BASIC METABOLIC PNL TOTAL CA: CPT

## 2022-03-11 PROCEDURE — 80076 HEPATIC FUNCTION PANEL: CPT | Performed by: EMERGENCY MEDICINE

## 2022-03-11 PROCEDURE — 83690 ASSAY OF LIPASE: CPT | Performed by: EMERGENCY MEDICINE

## 2022-03-11 PROCEDURE — 84484 ASSAY OF TROPONIN QUANT: CPT

## 2022-03-11 PROCEDURE — 85049 AUTOMATED PLATELET COUNT: CPT | Performed by: PHYSICIAN ASSISTANT

## 2022-03-11 PROCEDURE — 99223 1ST HOSP IP/OBS HIGH 75: CPT | Performed by: HOSPITALIST

## 2022-03-11 PROCEDURE — 99285 EMERGENCY DEPT VISIT HI MDM: CPT | Performed by: EMERGENCY MEDICINE

## 2022-03-11 PROCEDURE — 93005 ELECTROCARDIOGRAM TRACING: CPT

## 2022-03-11 PROCEDURE — 71046 X-RAY EXAM CHEST 2 VIEWS: CPT

## 2022-03-11 PROCEDURE — 85025 COMPLETE CBC W/AUTO DIFF WBC: CPT

## 2022-03-11 PROCEDURE — 71260 CT THORAX DX C+: CPT

## 2022-03-11 PROCEDURE — G1004 CDSM NDSC: HCPCS

## 2022-03-11 PROCEDURE — 36415 COLL VENOUS BLD VENIPUNCTURE: CPT

## 2022-03-11 PROCEDURE — 74177 CT ABD & PELVIS W/CONTRAST: CPT

## 2022-03-11 PROCEDURE — 99285 EMERGENCY DEPT VISIT HI MDM: CPT

## 2022-03-11 RX ORDER — ONDANSETRON 2 MG/ML
4 INJECTION INTRAMUSCULAR; INTRAVENOUS EVERY 6 HOURS PRN
Status: DISCONTINUED | OUTPATIENT
Start: 2022-03-11 | End: 2022-03-13 | Stop reason: HOSPADM

## 2022-03-11 RX ORDER — SODIUM CHLORIDE 9 MG/ML
100 INJECTION, SOLUTION INTRAVENOUS CONTINUOUS
Status: DISCONTINUED | OUTPATIENT
Start: 2022-03-11 | End: 2022-03-12

## 2022-03-11 RX ORDER — HEPARIN SODIUM 5000 [USP'U]/ML
5000 INJECTION, SOLUTION INTRAVENOUS; SUBCUTANEOUS EVERY 8 HOURS SCHEDULED
Status: DISCONTINUED | OUTPATIENT
Start: 2022-03-11 | End: 2022-03-13 | Stop reason: HOSPADM

## 2022-03-11 RX ORDER — LIDOCAINE HYDROCHLORIDE 20 MG/ML
15 SOLUTION OROPHARYNGEAL ONCE
Status: COMPLETED | OUTPATIENT
Start: 2022-03-11 | End: 2022-03-11

## 2022-03-11 RX ORDER — OXYCODONE HYDROCHLORIDE 10 MG/1
10 TABLET ORAL EVERY 4 HOURS PRN
Status: DISCONTINUED | OUTPATIENT
Start: 2022-03-11 | End: 2022-03-13 | Stop reason: HOSPADM

## 2022-03-11 RX ORDER — MAGNESIUM HYDROXIDE/ALUMINUM HYDROXICE/SIMETHICONE 120; 1200; 1200 MG/30ML; MG/30ML; MG/30ML
30 SUSPENSION ORAL ONCE
Status: COMPLETED | OUTPATIENT
Start: 2022-03-11 | End: 2022-03-11

## 2022-03-11 RX ORDER — ASPIRIN 81 MG/1
81 TABLET, CHEWABLE ORAL DAILY
Status: DISCONTINUED | OUTPATIENT
Start: 2022-03-12 | End: 2022-03-13 | Stop reason: HOSPADM

## 2022-03-11 RX ORDER — ISOSORBIDE MONONITRATE 30 MG/1
30 TABLET, EXTENDED RELEASE ORAL DAILY
Status: CANCELLED | OUTPATIENT
Start: 2022-03-12

## 2022-03-11 RX ORDER — OXYCODONE HYDROCHLORIDE 5 MG/1
5 TABLET ORAL EVERY 4 HOURS PRN
Status: DISCONTINUED | OUTPATIENT
Start: 2022-03-11 | End: 2022-03-13 | Stop reason: HOSPADM

## 2022-03-11 RX ORDER — ATORVASTATIN CALCIUM 20 MG/1
20 TABLET, FILM COATED ORAL DAILY
Status: CANCELLED | OUTPATIENT
Start: 2022-03-12

## 2022-03-11 RX ORDER — RANOLAZINE 500 MG/1
500 TABLET, EXTENDED RELEASE ORAL 2 TIMES DAILY
Status: DISCONTINUED | OUTPATIENT
Start: 2022-03-11 | End: 2022-03-13 | Stop reason: HOSPADM

## 2022-03-11 RX ORDER — HYDROMORPHONE HCL/PF 1 MG/ML
0.5 SYRINGE (ML) INJECTION EVERY 4 HOURS PRN
Status: DISCONTINUED | OUTPATIENT
Start: 2022-03-11 | End: 2022-03-13 | Stop reason: HOSPADM

## 2022-03-11 RX ADMIN — HEPARIN SODIUM 5000 UNITS: 5000 INJECTION INTRAVENOUS; SUBCUTANEOUS at 21:58

## 2022-03-11 RX ADMIN — METOPROLOL TARTRATE 25 MG: 25 TABLET, FILM COATED ORAL at 21:58

## 2022-03-11 RX ADMIN — SODIUM CHLORIDE 500 ML: 0.9 INJECTION, SOLUTION INTRAVENOUS at 18:44

## 2022-03-11 RX ADMIN — RANOLAZINE 500 MG: 500 TABLET, EXTENDED RELEASE ORAL at 21:58

## 2022-03-11 RX ADMIN — ALUMINUM HYDROXIDE, MAGNESIUM HYDROXIDE, AND SIMETHICONE 30 ML: 200; 200; 20 SUSPENSION ORAL at 17:41

## 2022-03-11 RX ADMIN — IOHEXOL 100 ML: 350 INJECTION, SOLUTION INTRAVENOUS at 18:41

## 2022-03-11 RX ADMIN — SODIUM CHLORIDE 100 ML/HR: 0.9 INJECTION, SOLUTION INTRAVENOUS at 21:57

## 2022-03-11 RX ADMIN — LIDOCAINE HYDROCHLORIDE 15 ML: 20 SOLUTION ORAL; TOPICAL at 17:41

## 2022-03-11 RX ADMIN — IOHEXOL 15 ML: 240 INJECTION, SOLUTION INTRATHECAL; INTRAVASCULAR; INTRAVENOUS; ORAL at 18:41

## 2022-03-11 NOTE — ED PROVIDER NOTES
History  Chief Complaint   Patient presents with    Chest Pain     pt had endoscopy at [facility] today  Started with chest pain approx 1300  took 2 nitro from his medication, no relief, 1 nitro prehospital slight relief      Patient is a 70-year-old male with past medical history significant for renal cell carcinoma in remission presenting to the emergency department after endoscopy and biopsy of his pancreas at [facility] earlier today  Patient states pain started approximately at 1:30 p m  He took 2 nitro from his medications at home for substernal left-sided chest pain that feels like angina  Patient states no relief with those nitro  Patient called 911 for further evaluation since it felt like his prior heart attack  Patient received 1 nitro pre-hospital which did help with some of his pain  Patient states chest pain was not associated with any diaphoresis, nausea, shortness of breath with exertion  Patient denies any vision change, headaches, neck pain, changes in hearing, neck pain, back pain, nausea, vomiting, diarrhea, constipation, dysuria, hematuria  Prior to Admission Medications   Prescriptions Last Dose Informant Patient Reported? Taking?    Cyanocobalamin (B-12) 1000 MCG CAPS  Spouse/Significant Other Yes No   Sig: Take by mouth daily   Patient not taking: Reported on 9/28/2021   Multiple Vitamin (MULTIVITAMIN) tablet  Spouse/Significant Other Yes Yes   Sig: Take 1 tablet by mouth daily   Multiple Vitamins-Minerals (PRESERVISION AREDS 2 PO)  Spouse/Significant Other Yes Yes   Sig: Take by mouth   aspirin 81 mg chewable tablet  Spouse/Significant Other Yes Yes   Sig: Chew 81 mg daily Will stop 10/16    atorvastatin (LIPITOR) 20 mg tablet   No Yes   Sig: Take 1 tablet (20 mg total) by mouth daily   cycloSPORINE (RESTASIS) 0 05 % ophthalmic emulsion  Spouse/Significant Other Yes Yes   Sig: Apply 1 drop to eye 2 (two) times a day     furosemide (LASIX) 20 mg tablet  Spouse/Significant Other Yes Yes   Sig: Take 20 mg by mouth as needed     isosorbide mononitrate (IMDUR) 30 mg 24 hr tablet Not Taking at Unknown time Spouse/Significant Other No No   Sig: Take 1 tablet (30 mg total) by mouth daily   Patient not taking: Reported on 1/10/2022    metoprolol tartrate (LOPRESSOR) 25 mg tablet   No Yes   Sig: Take 1 tablet (25 mg total) by mouth every 12 (twelve) hours   nitroglycerin (NITROSTAT) 0 4 mg SL tablet  Spouse/Significant Other No Yes   Sig: Place 1 tablet (0 4 mg total) under the tongue every 5 (five) minutes as needed for chest pain   ranolazine (RANEXA) 500 mg 12 hr tablet  Spouse/Significant Other No Yes   Sig: Take 1 tablet (500 mg total) by mouth 2 (two) times a day      Facility-Administered Medications: None       Past Medical History:   Diagnosis Date    Anesthesia     "can't urinate after surgery"    Basal cell carcinoma     left arm/ right eyelid/ right hand/ above right eyebrow in past    Bladder mass     Cataract     rosalind    Coronary artery disease     CABG X 2 2004    Essential tremor     of head and hands bilat    Hearing aid worn     bilat    History of kidney stones     History of pneumonia     childhood    History of prostate cancer     History of transfusion     2017    Hx of radiation therapy     2007 for after prostate surgery    Hyperlipidemia     Hypertension     Kidney stone     in past    Myocardial infarction ([address] )     Prostate CA 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 [address] )     and" left kidney and left ureter removed" 2017- had chemo    Urinary incontinence     wears Depends    Urinary incontinence     Wears glasses     Wears glasses     Wears partial dentures     lower       Past Surgical History:   Procedure Laterality Date    APPENDECTOMY      BACK SURGERY lumbar herniated disc repair    CARDIAC CATHETERIZATION      COLONOSCOPY      CORONARY ARTERY BYPASS GRAFT      x2 in 2004   [address] N/A 7/3/2017    Procedure: BLADDER BIOPSY;  Surgeon: Kristi Chavez MD;  [facility];  Service: Urology    CYSTOSCOPY W/ RETROGRADES Right 7/3/2017    Procedure: CYSTOSCOPY WITH RETROGRADE PYELOGRAM;  Surgeon: Kristi Chavez MD;  [facility];  Service: Urology    HERNIA REPAIR      groin    NEPHRECTOMY Left     and left ureter    NH CYSTOURETHROSCOPY,FULGUR <0 5 CM LESN Right 10/23/2018    Procedure: Dominic Newman, TURBT;  Surgeon: Kristi Chavez MD;  [facility];  Service: Urology    NH INSTILL ANTICANCER AGENT IN BLADDER N/A 10/23/2018    Procedure: Gonzales Young;  Surgeon: Kristi Chavez MD;  [facility];  Service: Urology    PROSTATECTOMY      ROTATOR CUFF REPAIR Left     SHOULDER SURGERY      dislocation    SKIN CANCER EXCISION  2011    left arm and right eyebrow       Family History   Problem Relation Age of Onset    No Known Problems Mother     No Known Problems Father      I have reviewed and agree with the history as documented  E-Cigarette/Vaping     E-Cigarette/Vaping Substances     Social History     Tobacco Use    Smoking status: Never Smoker    Smokeless tobacco: Never Used   Substance Use Topics    Alcohol use: Yes     Comment: few x year    Drug use: No        Review of Systems   Constitutional: Positive for activity change  Negative for chills, fatigue and fever  HENT: Positive for sore throat  Negative for congestion, ear pain, sinus pressure and trouble swallowing  Eyes: Negative for pain and visual disturbance  Respiratory: Positive for shortness of breath  Negative for cough and chest tightness  Cardiovascular: Negative for chest pain and palpitations  Gastrointestinal: Negative for abdominal pain, constipation, diarrhea, nausea and vomiting     Genitourinary: Negative for dysuria and hematuria  Musculoskeletal: Negative for arthralgias and back pain  Skin: Negative for color change and rash  Neurological: Negative for dizziness, seizures, syncope, light-headedness, numbness and headaches  Psychiatric/Behavioral: Negative for agitation and behavioral problems  All other systems reviewed and are negative  Physical Exam  ED Triage Vitals [03/11/22 1729]   Temperature Pulse Respirations Blood Pressure SpO2   97 9 °F (36 6 °C) 88 16 130/67 97 %      Temp Source Heart Rate Source Patient Position - Orthostatic VS BP Location FiO2 (%)   Oral Monitor Lying Right arm --      Pain Score       5             Orthostatic Vital Signs  Vitals:    03/11/22 1729 03/11/22 1911   BP: 130/67 128/69   Pulse: 88 64   Patient Position - Orthostatic VS: Lying Lying       Physical Exam  Vitals and nursing note reviewed  Constitutional:       Appearance: He is well-developed  HENT:      Head: Normocephalic and atraumatic  Eyes:      Extraocular Movements: Extraocular movements intact  Conjunctiva/sclera: Conjunctivae normal       Pupils: Pupils are equal, round, and reactive to light  Cardiovascular:      Rate and Rhythm: Normal rate and regular rhythm  Heart sounds: Normal heart sounds  No murmur heard  Pulmonary:      Effort: Pulmonary effort is normal  No tachypnea or respiratory distress  Breath sounds: Normal breath sounds  No decreased breath sounds or wheezing  Chest:      Chest wall: No deformity, tenderness or crepitus  Abdominal:      General: Abdomen is flat  Palpations: Abdomen is soft  Tenderness: There is abdominal tenderness in the epigastric area  Musculoskeletal:      Cervical back: Normal range of motion and neck supple  Right lower leg: No tenderness  No edema  Left lower leg: No tenderness  No edema  Skin:     General: Skin is warm and dry  Capillary Refill: Capillary refill takes less than 2 seconds  Neurological:      General: No focal deficit present  Mental Status: He is alert and oriented to person, place, and time  Cranial Nerves: No cranial nerve deficit  Motor: No weakness     Psychiatric:         Mood and Affect: Mood normal          Behavior: Behavior normal          ED Medications  Medications   aluminum-magnesium hydroxide-simethicone (MYLANTA) oral suspension 30 mL (30 mL Oral Given 3/11/22 1741)   Lidocaine Viscous HCl (XYLOCAINE) 2 % mucosal solution 15 mL (15 mL Swish & Spit Given 3/11/22 1741)   sodium chloride 0 9 % bolus 500 mL (0 mL Intravenous Stopped 3/11/22 1908)   iohexol (OMNIPAQUE) 350 MG/ML injection (SINGLE-DOSE) 100 mL (100 mL Intravenous Given 3/11/22 1841)   iohexol (OMNIPAQUE) 240 MG/ML solution 15 mL (15 mL Oral Given 3/11/22 1841)       Diagnostic Studies  Results Reviewed     Procedure Component Value Units Date/Time    Lipase [[id number]]  (Abnormal) Collected: 03/11/22 1740    Lab Status: Final result Specimen: Blood from Arm, Left Updated: 03/11/22 1959     Lipase 36,750 u/L     HS Troponin I 2hr [[id number]]  (Normal) Collected: 03/11/22 1908    Lab Status: Final result Specimen: Blood from Arm, Left Updated: 03/11/22 1947     hs TnI 2hr 7 ng/L      Delta 2hr hsTnI 0 ng/L     HS Troponin I 4hr [[id number]]     Lab Status: No result Specimen: Blood     Hepatic function panel [[id number]]  (Abnormal) Collected: 03/11/22 1740    Lab Status: Final result Specimen: Blood from Arm, Left Updated: 03/11/22 1921     Total Bilirubin 0 35 mg/dL      Bilirubin, Direct 0 12 mg/dL      Alkaline Phosphatase 86 U/L      AST 20 U/L      ALT 34 U/L      Total Protein 7 7 g/dL      Albumin 3 1 g/dL     HS Troponin 0hr (reflex protocol) [[id number]]  (Normal) Collected: 03/11/22 1740    Lab Status: Final result Specimen: Blood from Arm, Left Updated: 03/11/22 1809     hs TnI 0hr 7 ng/L     Basic metabolic panel [[id number]]  (Abnormal) Collected: 03/11/22 1740    Lab Status: Final result Specimen: Blood from Arm, Left Updated: 03/11/22 1758     Sodium 139 mmol/L      Potassium 4 3 mmol/L      Chloride 102 mmol/L      CO2 28 mmol/L      ANION GAP 9 mmol/L      BUN 21 mg/dL      Creatinine 1 22 mg/dL      Glucose 172 mg/dL      Calcium 8 6 mg/dL      eGFR 56 ml/min/1 73sq m     Narrative:      [place] guidelines for Chronic Kidney Disease (CKD):     Stage 1 with normal or high GFR (GFR > 90 mL/min/1 73 square meters)    Stage 2 Mild CKD (GFR = 60-89 mL/min/1 73 square meters)    Stage 3A Moderate CKD (GFR = 45-59 mL/min/1 73 square meters)    Stage 3B Moderate CKD (GFR = 30-44 mL/min/1 73 square meters)    Stage 4 Severe CKD (GFR = 15-29 mL/min/1 73 square meters)    Stage 5 End Stage CKD (GFR <15 mL/min/1 73 square meters)  Note: GFR calculation is accurate only with a steady state creatinine    CBC and differential [[id number]]  (Abnormal) Collected: 03/11/22 1740    Lab Status: Final result Specimen: Blood from Arm, Left Updated: 03/11/22 1747     WBC 9 83 Thousand/uL      RBC 4 18 Million/uL      Hemoglobin 12 1 g/dL      Hematocrit 37 2 %      MCV 89 fL      MCH 28 9 pg      MCHC 32 5 g/dL      RDW 17 2 %      MPV 10 2 fL      Platelets 267 Thousands/uL      nRBC 0 /100 WBCs      Neutrophils Relative 87 %      Immat GRANS % 1 %      Lymphocytes Relative 6 %      Monocytes Relative 6 %      Eosinophils Relative 0 %      Basophils Relative 0 %      Neutrophils Absolute 8 42 Thousands/µL      Immature Grans Absolute 0 10 Thousand/uL      Lymphocytes Absolute 0 63 Thousands/µL      Monocytes Absolute 0 63 Thousand/µL      Eosinophils Absolute 0 03 Thousand/µL      Basophils Absolute 0 02 Thousands/µL                  CT chest abdomen pelvis w contrast   ED Interpretation by Mikaela Monday, DO (03/11 2018)     FINDINGS:     CHEST     LUNGS:  A few small calcified granulomas  No suspicious mass or focal consolidation    There is no tracheal or endobronchial lesion      PLEURA:  Scattered pleural calcifications        HEART/GREAT VESSELS: Thoracic aortic and coronary atherosclerosis  No thoracic aortic aneurysm      MEDIASTINUM AND BIJU:  Calcified right hilar lymph nodes      CHEST WALL AND LOWER NECK:   Unremarkable      ABDOMEN     LIVER/BILIARY TREE:  One or more subcentimeter sharply circumscribed low-density hepatic lesion(s) are noted, too small to accurately characterize, but statistically most likely to represent subcentimeter hepatic cysts  No suspicious solid hepatic   lesion is identified  Hepatic contours are normal   No biliary dilatation      GALLBLADDER:  No calcified gallstones  No pericholecystic inflammatory change      SPLEEN:  Calcified granulomata are noted in the spleen  No suspicious splenic mass      PANCREAS:  2 7 cm heterogeneous pancreatic head mass  This ab   uts the superior mesenteric vein, with less than 180 degrees encasement  This does not involve the celiac artery or SMA  This abuts the main portal vein with less than 180 degrees   encasement  Mild peripancreatic edema      ADRENAL GLANDS:  Unremarkable      KIDNEYS/URETERS:  Status post left nephrectomy  Multiple right renal cysts  There is right kidney urothelial thickening and enhancement at the renal pelvis, with associated inflammatory stranding  Narrowing of the proximal ureter      STOMACH AND BOWEL:  Fecal impaction at the rectum      APPENDIX:  There are expected postoperative changes of appendectomy      ABDOMINOPELVIC CAVITY:  No ascites  No pneumoperitoneum  No lymphadenopathy      VESSELS:  Unremarkable for patient's age      PELVIS     REPRODUCTIVE ORGANS:  Status post prostatectomy      URINARY BLADDER:  Unremarkable      ABDOMINAL WALL/INGUINAL REGIONS:  Unremarkable      OSSEOUS STRUCTURES:  No acute fracture or destructive osseous lesion  Spinal degenerativ   e changes are noted    Degenerative changes of both sacroiliac joints with mixed sclerotic appearance of the bilateral iliac bones adjacent to the sacroiliac joints,   may represent degenerative or chronic stress change         IMPRESSION:     Mild peripancreatic fat stranding and edema about the body and tail, likely secondary to acute pancreatitis in the setting of recent endoscopic ultrasound and hepatic mass biopsy      Known pancreatic head mass again noted      Right renal urothelial thickening and enhancement, may represent chronic ureteritis, possible stricturing at the ureteropelvic junction      Bilateral pleural calcified plaques, possibly sequela of chronic asbestos exposure      Additional nonacute findings, as described  Final Result by Lynne Darby DO (03/11 1957)      Mild peripancreatic fat stranding and edema about the body and tail, likely secondary to acute pancreatitis in the setting of recent endoscopic ultrasound and hepatic mass biopsy  Known pancreatic head mass again noted  Right renal urothelial thickening and enhancement, may represent chronic ureteritis, possible stricturing at the ureteropelvic junction  Bilateral pleural calcified plaques, possibly sequela of chronic asbestos exposure  Additional nonacute findings, as described  I personally discussed this study with Ezekiel Pozo on 3/11/2022 at 7:56 PM                Workstation performed: [id number]         XR chest 2 views   ED Interpretation by Jeremy Sanchez DO (03/11 1815)   No pneumoperitoneium, no pneumomediastinum, no effusions or consolidations noted            Procedures  ECG 12 Lead Documentation Only    Date/Time: 3/11/2022 5:35 PM  Performed by: Jeremy Sanchez DO  Authorized by:  Jeremy Sanchez DO     Indications / Diagnosis:  Cp  ECG reviewed by me, the ED Provider: yes    Patient location:  ED  Previous ECG:     Previous ECG:  Compared to current    Comparison to cardiac monitor: No    Interpretation:     Interpretation: abnormal Rate:     ECG rate:  90    ECG rate assessment: normal    Rhythm:     Rhythm: other rhythm    Ectopy:     Ectopy: PVCs      PVCs:  Frequent  QRS:     QRS axis:  Left    QRS intervals: Wide  ST segments:     ST segments:  Non-specific  T waves:     T waves: non-specific      ECG 12 Lead Documentation Only    Date/Time: 3/11/2022 7:16 PM  Performed by: Ashley Wang DO  Authorized by: Ashley Wang DO     Indications / Diagnosis:  Repeat  ECG reviewed by me, the ED Provider: yes    Patient location:  ED  Previous ECG:     Previous ECG:  Compared to current    Similarity:  Changes noted  Interpretation:     Interpretation: abnormal    Rate:     ECG rate:  91    ECG rate assessment: normal    Rhythm:     Rhythm: sinus rhythm    Ectopy:     Ectopy: none    QRS:     QRS axis:  Left    QRS intervals: Wide  Conduction:     Conduction: abnormal      Abnormal conduction: 1st degree    ST segments:     ST segments:  Non-specific  T waves:     T waves: non-specific            ED Course  ED Course as of 03/11/22 2025   Fri Mar 11, 2022   1733 Blood Pressure: 130/67   1733 Temperature: 97 9 °F (36 6 °C)   1733 Temp Source: Oral   1733 Pulse: 88   1733 Respirations: 16   1733 SpO2: 97 %  Will evaluate patient with cardiac workup which includes but not limited to CBC, BMP, troponin, EKG, chest x-ray two views  Will treat patient with GI cocktail which consisted of viscous lidocaine and Maalox  Patient is aware is no questions or concerns at this time will frequently re-evaluate  1813 hs TnI 0hr: 7  Patient with epigastric pain will evaluate patient with CT chest abdomen pelvis contrast as well as lipase to evaluate for other causes of patient's abdominal pain  Patient with a heart score of 6     1949 hs TnI 2hr: 7   1956 Patient with imaging findings consistent with pancreatitis according to radiology  2002 Lipase(!): 36,750   2010 Patient aware of pancreatitis all lab and imaging findings  Informed patient of inpatient admission  Patient is where has no questions or concerns at this time  Will tiger text [facility] for admission  2012 Reach out to [facility] for admission  HEART Risk Score      Most Recent Value   Heart Score Risk Calculator    History 1 Filed at: 03/11/2022 1822   ECG 1 Filed at: 03/11/2022 3723   Age 2 Filed at: 03/11/2022 1822   Risk Factors 2 Filed at: 03/11/2022 1822   Troponin 0 Filed at: 03/11/2022 1822   HEART Score 6 Filed at: 03/11/2022 4037                                MDM    Disposition  Final diagnoses:   Acute pancreatitis   Chest pain     Time reflects when diagnosis was documented in both MDM as applicable and the Disposition within this note     Time User Action Codes Description Comment    3/11/2022  8:24 PM Tiana Calixto [K85 [id number]] Acute pancreatitis     3/11/2022  8:24 PM Tiana Calixto [R07 9] Chest pain       ED Disposition     ED Disposition Condition Date/Time Comment    Admit Stable Fri Mar 11, 2022  8:24 PM Case was discussed with Dr Gurdeep wu the patient's admission status was agreed to beinpatient to the service of Dr No Guadalupe    None         Patient's Medications   Discharge Prescriptions    No medications on file     No discharge procedures on file  PDMP Review     None           ED Provider  Attending physically available and evaluated Michaela Wright I managed the patient along with the ED Attending      Electronically Signed by         Brian Mack DO  03/11/22 2025

## 2022-03-11 NOTE — ED ATTENDING ATTESTATION
3/11/2022  IKasey DO, saw and evaluated the patient  I have discussed the patient with the resident/non-physician practitioner and agree with the resident's/non-physician practitioner's findings, Plan of Care, and MDM as documented in the resident's/non-physician practitioner's note, except where noted  All available labs and Radiology studies were reviewed  I was present for key portions of any procedure(s) performed by the resident/non-physician practitioner and I was immediately available to provide assistance  At this point I agree with the current assessment done in the Emergency Department  I have conducted an independent evaluation of this patient a history and physical is as follows:    65 yo M h/o CAD s/p CABG, HTN, HLD, kidney ca; presenting for evaluation of chest pain  Pt had an endoscopy/EUS with biopsies performed on a pancreatic lesion at [facility] around 1pm this afternoon  No complications with the procedure/anesthesia  While driving home he developed CP  States pain is substernal and radiates to back between his shoulder blades and epigastric/LUQ region  He has 2 SL nitro PTA which improved pain, but it is still present  States this feels similar to "anginal" pain that he has had in the past  Reports throat irritation, no difficulty or specific pain swallowing  Denies pleuritic nature of the pain, SOB, N/V, leg pain/swelling  H/o CAD s/p CABG and had cardiac cath in 2018 with a lesion not amenable to intervention, follows with Dr Roy Bio  - Stage 3 kidney ca s/p L nephroureterectomy with TURBT, felt to be in remission until new suspicious lesions were found  No current treatment  Took his morning medications this morning    Per review of records-  EUS report  - An irregular mass was identified in the pancreatic neck  The mass was        hypoechoic  The mass measured 40 mm by 35 mm in maximal cross-sectional        diameter  The endosonographic borders were poorly-defined   There Pulmonary Progress Note    Subjective:   Daily Progress Note: 2020 1:58 PM    CC: From here arm diabetes with fever aspiration    HPI: 52-year-old male came in with shortness of breath, Wetzel Mcburney he was COVID positive also is HIV positive    Patient in distress on oxygen nasal cannula mouth open but does not communicate  He is removing his oxygen nasal cannula  Now he is on room air saturation about 90%  Review of Systems  Unable to obtain    Objective:     Visit Vitals  BP (!) 142/88 (BP 1 Location: Left arm)   Pulse 73   Temp 97.4 °F (36.3 °C)   Resp 20   Ht 5' 10\" (1.778 m)   Wt 82.2 kg (181 lb 3.5 oz)   SpO2 96%   BMI 26.00 kg/m²    O2 Flow Rate (L/min): 4 l/min O2 Device: Room air    Temp (24hrs), Av.6 °F (36.4 °C), Min:97.4 °F (36.3 °C), Max:97.9 °F (36.6 °C)       07 -  1900  In: 2322   Out: -    190 -  0700  In: 2310   Out: 400 [Urine:400]    Visit Vitals  BP (!) 142/88 (BP 1 Location: Left arm)   Pulse 73   Temp 97.4 °F (36.3 °C)   Resp 20   Ht 5' 10\" (1.778 m)   Wt 82.2 kg (181 lb 3.5 oz)   SpO2 96%   BMI 26.00 kg/m²     General:  Alert, cooperative, no distress, appears stated age. Head:  Normocephalic, without obvious abnormality, atraumatic. Eyes:  Conjunctivae/corneas clear. PERRL, EOMs intact. Fundi benign   Ears:  Normal TMs and external ear canals both ears. Nose: Nares normal. Septum midline. Mucosa normal. No drainage or sinus tenderness. Throat: Lips, mucosa, and tongue normal. Teeth and gums normal.   Neck: Supple, symmetrical, trachea midline, no adenopathy, thyroid: no enlargement/tenderness/nodules, no carotid bruit and no JVD. Back:   Symmetric, no curvature. ROM normal. No CVA tenderness. Lungs:   Clear to auscultation bilaterally. Chest wall:  No tenderness or deformity. Heart:  Regular rate and rhythm, S1, S2 normal, no murmur, click, rub or gallop. Abdomen:   Soft, non-tender. Bowel sounds normal. No masses,  No organomegaly. Genitalia:  Normal male without lesion, discharge or tenderness. Rectal:  Normal tone, normal prostate, no masses or tenderness  Guaiac negative stool. Extremities: Extremities normal, atraumatic, no cyanosis or edema. Pulses: 2+ and symmetric all extremities. Skin: Skin color, texture, turgor normal. No rashes or lesions   Lymph nodes: Cervical, supraclavicular, and axillary nodes normal.               Data Review    No results found for this or any previous visit (from the past 24 hour(s)).     Current Facility-Administered Medications   Medication Dose Route Frequency    pantoprazole (PROTONIX) granules for oral suspension 40 mg  40 mg Per G Tube ACB    piperacillin-tazobactam (ZOSYN) 3.375 g in 0.9% sodium chloride (MBP/ADV) 100 mL MBP  3.375 g IntraVENous Q8H    cyanocobalamin tablet 1,000 mcg  1,000 mcg Oral DAILY    emtricitabine-tenofovir (TDF) (TRUVADA) 200-300 mg per tablet 1 Tab  1 Tab Oral DAILY    enoxaparin (LOVENOX) injection 40 mg  40 mg SubCUTAneous Q24H    predniSONE (DELTASONE) tablet 20 mg  20 mg Per G Tube DAILY    raltegravir (ISENTRESS) tablet 400 mg  400 mg Oral BID    thiamine mononitrate (B-1) tablet 50 mg  50 mg Oral DAILY    ziprasidone (GEODON) capsule 40 mg  40 mg Per G Tube BID WITH MEALS    acetaminophen (TYLENOL) suppository 650 mg  650 mg Rectal Q4H PRN    acetaminophen (TYLENOL) tablet 650 mg  650 mg Oral Q6H PRN         Assessment/Plan:     COVID-19 pneumonia  Acute  hypoxic respiratory failure resolved  HIV positive  History of transitional cell bladder cancer  Klebsiella pneumonia resolved patient is not on vancomycin or any other antibiotics  Continue to wean oxygen per protocol he is right now he is on room air saturation is about 90%   patient was treated with Zosyn Lovenox prednisone Zithromax and  Actemra  Awaiting for placement was        sonographic evidence suggesting invasion into the splenic vein        (manifested by encasement) and the splenoportal confluence (manifested        by abutment)  The remainder of the pancreas was examined  The        endosonographic appearance of parenchyma and the upstream pancreatic        duct indicated duct dilation  Fine needle biopsy was performed  Color        Doppler imaging was utilized prior to needle puncture to confirm a lack        of significant vascular structures within the needle path  Five passes        were made with the 22 gauge [employer] biopsy needle using a transgastric        approach  - There was no sign of significant endosonographic abnormality in the        common bile duct  No stones and ducts of normal caliber were identified         The head of the pancreas and distal CBD was difficult to visualize due        to scope instability and poor maneuverability in the bulb and sweep from        what appears to be extrinsic compression of the bulb         There was no sign of significant endosonographic abnormality in the        visualized portion of the liver  No focal pathology was identified         There was no sign of significant endosonographic abnormality in the        ampulla  EGD: Normal esophagus                           - Normal stomach                           - Duodenal deformity                         - No specimens collected      MDM: 65 yo M with CP/upper abdominal pain after EGD/EUS with pancreas biopsies- cardiac workup, lipase, CT C/A/P, reassess, dispo accordingly      ED Course         Critical Care Time  Procedures

## 2022-03-12 PROBLEM — I25.10 CAD (CORONARY ARTERY DISEASE): Status: ACTIVE | Noted: 2018-01-05

## 2022-03-12 PROBLEM — E43 SEVERE PROTEIN-CALORIE MALNUTRITION (HCC): Status: ACTIVE | Noted: 2022-03-12

## 2022-03-12 LAB
ALBUMIN SERPL BCP-MCNC: 2.9 G/DL (ref 3.5–5)
ALP SERPL-CCNC: 79 U/L (ref 46–116)
ALT SERPL W P-5'-P-CCNC: 30 U/L (ref 12–78)
ANION GAP SERPL CALCULATED.3IONS-SCNC: 9 MMOL/L (ref 4–13)
AST SERPL W P-5'-P-CCNC: 20 U/L (ref 5–45)
ATRIAL RATE: 91 BPM
BASOPHILS # BLD AUTO: 0.01 THOUSANDS/ΜL (ref 0–0.1)
BASOPHILS NFR BLD AUTO: 0 % (ref 0–1)
BILIRUB SERPL-MCNC: 0.45 MG/DL (ref 0.2–1)
BUN SERPL-MCNC: 17 MG/DL (ref 5–25)
CALCIUM ALBUM COR SERPL-MCNC: 9.4 MG/DL (ref 8.3–10.1)
CALCIUM SERPL-MCNC: 8.5 MG/DL (ref 8.3–10.1)
CHLORIDE SERPL-SCNC: 105 MMOL/L (ref 100–108)
CHOLEST SERPL-MCNC: 115 MG/DL
CO2 SERPL-SCNC: 24 MMOL/L (ref 21–32)
CREAT SERPL-MCNC: 0.98 MG/DL (ref 0.6–1.3)
EOSINOPHIL # BLD AUTO: 0.03 THOUSAND/ΜL (ref 0–0.61)
EOSINOPHIL NFR BLD AUTO: 0 % (ref 0–6)
ERYTHROCYTE [DISTWIDTH] IN BLOOD BY AUTOMATED COUNT: 17.5 % (ref 11.6–15.1)
GFR SERPL CREATININE-BSD FRML MDRD: 73 ML/MIN/1.73SQ M
GLUCOSE SERPL-MCNC: 130 MG/DL (ref 65–140)
HCT VFR BLD AUTO: 36 % (ref 36.5–49.3)
HDLC SERPL-MCNC: 23 MG/DL
HGB BLD-MCNC: 11.6 G/DL (ref 12–17)
IMM GRANULOCYTES # BLD AUTO: 0.05 THOUSAND/UL (ref 0–0.2)
IMM GRANULOCYTES NFR BLD AUTO: 1 % (ref 0–2)
LDLC SERPL CALC-MCNC: 59 MG/DL (ref 0–100)
LYMPHOCYTES # BLD AUTO: 0.6 THOUSANDS/ΜL (ref 0.6–4.47)
LYMPHOCYTES NFR BLD AUTO: 7 % (ref 14–44)
MCH RBC QN AUTO: 28 PG (ref 26.8–34.3)
MCHC RBC AUTO-ENTMCNC: 32.2 G/DL (ref 31.4–37.4)
MCV RBC AUTO: 87 FL (ref 82–98)
MONOCYTES # BLD AUTO: 0.49 THOUSAND/ΜL (ref 0.17–1.22)
MONOCYTES NFR BLD AUTO: 6 % (ref 4–12)
NEUTROPHILS # BLD AUTO: 7.38 THOUSANDS/ΜL (ref 1.85–7.62)
NEUTS SEG NFR BLD AUTO: 86 % (ref 43–75)
NONHDLC SERPL-MCNC: 92 MG/DL
NRBC BLD AUTO-RTO: 0 /100 WBCS
P AXIS: 60 DEGREES
PLATELET # BLD AUTO: 237 THOUSANDS/UL (ref 149–390)
PMV BLD AUTO: 9.8 FL (ref 8.9–12.7)
POTASSIUM SERPL-SCNC: 4.2 MMOL/L (ref 3.5–5.3)
PR INTERVAL: 348 MS
PROT SERPL-MCNC: 7.2 G/DL (ref 6.4–8.2)
QRS AXIS: -51 DEGREES
QRSD INTERVAL: 158 MS
QT INTERVAL: 444 MS
QTC INTERVAL: 546 MS
RBC # BLD AUTO: 4.15 MILLION/UL (ref 3.88–5.62)
SODIUM SERPL-SCNC: 138 MMOL/L (ref 136–145)
T WAVE AXIS: 50 DEGREES
TRIGL SERPL-MCNC: 167 MG/DL
VENTRICULAR RATE: 91 BPM
WBC # BLD AUTO: 8.56 THOUSAND/UL (ref 4.31–10.16)

## 2022-03-12 PROCEDURE — 80061 LIPID PANEL: CPT | Performed by: PHYSICIAN ASSISTANT

## 2022-03-12 PROCEDURE — 99232 SBSQ HOSP IP/OBS MODERATE 35: CPT | Performed by: INTERNAL MEDICINE

## 2022-03-12 PROCEDURE — 80053 COMPREHEN METABOLIC PANEL: CPT | Performed by: PHYSICIAN ASSISTANT

## 2022-03-12 PROCEDURE — 99222 1ST HOSP IP/OBS MODERATE 55: CPT | Performed by: INTERNAL MEDICINE

## 2022-03-12 PROCEDURE — 93010 ELECTROCARDIOGRAM REPORT: CPT

## 2022-03-12 PROCEDURE — 85025 COMPLETE CBC W/AUTO DIFF WBC: CPT | Performed by: PHYSICIAN ASSISTANT

## 2022-03-12 RX ORDER — LANOLIN ALCOHOL/MO/W.PET/CERES
6 CREAM (GRAM) TOPICAL
Status: DISCONTINUED | OUTPATIENT
Start: 2022-03-12 | End: 2022-03-13 | Stop reason: HOSPADM

## 2022-03-12 RX ADMIN — MELATONIN 6 MG: at 02:47

## 2022-03-12 RX ADMIN — MELATONIN 6 MG: at 21:27

## 2022-03-12 RX ADMIN — METOPROLOL TARTRATE 25 MG: 25 TABLET, FILM COATED ORAL at 21:27

## 2022-03-12 RX ADMIN — RANOLAZINE 500 MG: 500 TABLET, EXTENDED RELEASE ORAL at 09:35

## 2022-03-12 RX ADMIN — METOPROLOL TARTRATE 25 MG: 25 TABLET, FILM COATED ORAL at 09:35

## 2022-03-12 RX ADMIN — SODIUM CHLORIDE 100 ML/HR: 0.9 INJECTION, SOLUTION INTRAVENOUS at 09:37

## 2022-03-12 RX ADMIN — HEPARIN SODIUM 5000 UNITS: 5000 INJECTION INTRAVENOUS; SUBCUTANEOUS at 06:21

## 2022-03-12 RX ADMIN — ASPIRIN 81 MG CHEWABLE TABLET 81 MG: 81 TABLET CHEWABLE at 09:35

## 2022-03-12 RX ADMIN — RANOLAZINE 500 MG: 500 TABLET, EXTENDED RELEASE ORAL at 17:48

## 2022-03-12 RX ADMIN — HEPARIN SODIUM 5000 UNITS: 5000 INJECTION INTRAVENOUS; SUBCUTANEOUS at 14:07

## 2022-03-12 NOTE — ASSESSMENT & PLAN NOTE
Lab Results   Component Value Date    HGBA1C 6 9 (H) 11/08/2021       No results for input(s): POCGLU in the last 72 hours  Blood Sugar Average: Last 72 hrs:  can hold metformin given well controlled in light of advanced age, multiple malignancies and poor oral intake

## 2022-03-12 NOTE — UTILIZATION REVIEW
Initial Clinical Review    Admission: Date/Time/Statement:   Admission Orders (From admission, onward)     Ordered        03/11/22 2025  Inpatient Admission  Once                      Orders Placed This Encounter   Procedures    Inpatient Admission     Standing Status:   Standing     Number of Occurrences:   1     Order Specific Question:   Level of Care     Answer:   Med Surg [16]     Order Specific Question:   Estimated length of stay     Answer:   More than 2 Midnights     Order Specific Question:   Certification     Answer:   I certify that inpatient services are medically necessary for this patient for a duration of greater than two midnights  See H&P and MD Progress Notes for additional information about the patient's course of treatment  ED Arrival Information     Expected Arrival Acuity    - 3/11/2022 17:22 Urgent         Means of arrival Escorted by Service Admission type    Ambulance [facility] Urgent         Arrival complaint    Chest Pain        Chief Complaint   Patient presents with    Chest Pain     pt had endoscopy at [facility] today  Started with chest pain approx 1300  took 2 nitro from his medication, no relief, 1 nitro prehospital slight relief        Initial Presentation: 65 yo male with pmh of cad, solitary kidney status due to [address], bladder ca to ED by ems admitted Inpatient d/t Acute pancreatitis  Recently diagnosed pancreatic head mass 2 6cm appears to be encasing SMV and 180deg abutting portal vein  just underwent ct urogram at [facility] concerning for ureteral stenosis 2* suspected urothelial carcinoma  Presented with Abdominal pain and belching  mild epigastric tenderness  Elevated lipase  Imaging shows Mild peripancreatic fat stranding and edema about the body and tail, likely secondary to acute pancreatitis in the setting of recent endoscopic ultrasound and hepatic mass biopsy  Known pancreatic head mass again noted   Right renal urothelial thickening and enhancement, may represent chronic ureteritis, possible stricturing at the ureteropelvic junction  Bilateral pleural calcified plaques, possibly sequela of chronic asbestos exposure  Endoscopic biospy of pancreas  NPO  IVF  Analgesics  GI consult  Op f/u with [facility] hematology and uro oncology       · Date: 3/12   Day 2:Clinically improving  Pain improved  advance diet slowly  IVF  Analgesics  3/12 GI Consult:pain has decreased significantly but is still mildly present  Pt is interested in a diet  pancreatitis is most-likely from post-pancreatic instrumentation with EUS and FNA however will get lipid panel to rule out hyperTGL npo  IVF  Analgesics  clear liquid diet  ED Triage Vitals [03/11/22 1729]   Temperature Pulse Respirations Blood Pressure SpO2   97 9 °F (36 6 °C) 88 16 130/67 97 %      Temp Source Heart Rate Source Patient Position - Orthostatic VS BP Location FiO2 (%)   Oral Monitor Lying Right arm --      Pain Score       5          Wt Readings from Last 1 Encounters:   03/11/22 64 4 kg (142 lb)     Additional Vital Signs:   03/12/22 08:31:40 97 4 °F (36 3 °C) Abnormal  91 -- 118/81 93 94 % -- --   03/11/22 23:05:38 -- 63 18 141/75 97 97 % -- --   03/11/22 22:01:22 -- 94 -- 136/82 100 96 % -- --   03/11/22 2158 -- 94 -- 136/82 -- -- -- --   03/11/22 2010 -- 88 12 131/69 -- 98 % None (Room air) Lying   03/11/22 1911 -- 64 18 128/69 -- 99 % None (Room air) Lying   03/11/22 1729 97 9 °F (36 6 °C) 88 16 130/67 -- 97 % None (Room air) Lying       Pertinent Labs/Diagnostic Test Results:   3/11 EKG:  ECG 12 Lead Documentation Only     Date/Time: 3/11/2022 5:35 PM  Performed by: Cassy Cote DO  Authorized by:  Cassy Cote DO      Indications / Diagnosis:  Cp  ECG reviewed by me, the ED Provider: yes    Patient location:  ED  Previous ECG:     Previous ECG:  Compared to current    Comparison to cardiac monitor: No    Interpretation:     Interpretation: abnormal    Rate:     ECG rate: 90    ECG rate assessment: normal    Rhythm:     Rhythm: other rhythm    Ectopy:     Ectopy: PVCs      PVCs:  Frequent  QRS:     QRS axis:  Left    QRS intervals: Wide  ST segments:     ST segments:  Non-specific  T waves:     T waves: non-specific       ECG 12 Lead Documentation Only     Date/Time: 3/11/2022 7:16 PM  Performed by: Radha Petersen DO  Authorized by: Radha Petersen DO      Indications / Diagnosis:  Repeat  ECG reviewed by me, the ED Provider: yes    Patient location:  ED  Previous ECG:     Previous ECG:  Compared to current    Similarity:  Changes noted  Interpretation:     Interpretation: abnormal    Rate:     ECG rate:  91    ECG rate assessment: normal    Rhythm:     Rhythm: sinus rhythm    Ectopy:     Ectopy: none    QRS:     QRS axis:  Left    QRS intervals: Wide  Conduction:     Conduction: abnormal      Abnormal conduction: 1st degree    ST segments:     ST segments:  Non-specific  T waves:     T waves: non-specific        3/11 EKG:  Sinus rhythm with 1st degree A-V block  Left axis deviation  Right bundle branch block  Abnormal ECG  Confirmed by Jamie Epstein ([id number]) on 3/12/2022 12:58:33 AM    3/11 EKG:  Sinus rhythm with 1st degree AV block  Left axis deviation  Right bundle branch block  Abnormal ECG  When compared with ECG of 06-JAN-2018 04:51,  No significant change was found  Left anterior fascicular block is no longer Present  Confirmed by Joseph Ramirez ([id number]) on 3/11/2022 5:42:38 PM    CT chest abdomen pelvis w contrast   ED Interpretation by Mikayla Rader DO (03/11 2018)     FINDINGS:     CHEST     LUNGS:  A few small calcified granulomas  No suspicious mass or focal consolidation  There is no tracheal or endobronchial lesion      PLEURA:  Scattered pleural calcifications        HEART/GREAT VESSELS: Thoracic aortic and coronary atherosclerosis    No thoracic aortic aneurysm      MEDIASTINUM AND BIJU:  Calcified right hilar lymph nodes      CHEST WALL AND LOWER NECK:   Unremarkable      ABDOMEN     LIVER/BILIARY TREE:  One or more subcentimeter sharply circumscribed low-density hepatic lesion(s) are noted, too small to accurately characterize, but statistically most likely to represent subcentimeter hepatic cysts  No suspicious solid hepatic   lesion is identified  Hepatic contours are normal   No biliary dilatation      GALLBLADDER:  No calcified gallstones  No pericholecystic inflammatory change      SPLEEN:  Calcified granulomata are noted in the spleen  No suspicious splenic mass      PANCREAS:  2 7 cm heterogeneous pancreatic head mass  This ab   uts the superior mesenteric vein, with less than 180 degrees encasement  This does not involve the celiac artery or SMA  This abuts the main portal vein with less than 180 degrees   encasement  Mild peripancreatic edema      ADRENAL GLANDS:  Unremarkable      KIDNEYS/URETERS:  Status post left nephrectomy  Multiple right renal cysts  There is right kidney urothelial thickening and enhancement at the renal pelvis, with associated inflammatory stranding  Narrowing of the proximal ureter      STOMACH AND BOWEL:  Fecal impaction at the rectum      APPENDIX:  There are expected postoperative changes of appendectomy      ABDOMINOPELVIC CAVITY:  No ascites  No pneumoperitoneum  No lymphadenopathy      VESSELS:  Unremarkable for patient's age      PELVIS     REPRODUCTIVE ORGANS:  Status post prostatectomy      URINARY BLADDER:  Unremarkable      ABDOMINAL WALL/INGUINAL REGIONS:  Unremarkable      OSSEOUS STRUCTURES:  No acute fracture or destructive osseous lesion  Spinal degenerativ   e changes are noted    Degenerative changes of both sacroiliac joints with mixed sclerotic appearance of the bilateral iliac bones adjacent to the sacroiliac joints,   may represent degenerative or chronic stress change         IMPRESSION:     Mild peripancreatic fat stranding and edema about the body and tail, likely secondary to acute pancreatitis in the setting of recent endoscopic ultrasound and hepatic mass biopsy      Known pancreatic head mass again noted      Right renal urothelial thickening and enhancement, may represent chronic ureteritis, possible stricturing at the ureteropelvic junction      Bilateral pleural calcified plaques, possibly sequela of chronic asbestos exposure      Additional nonacute findings, as described  Final Result by Sarita Vergara DO (03/11 1957)      Mild peripancreatic fat stranding and edema about the body and tail, likely secondary to acute pancreatitis in the setting of recent endoscopic ultrasound and hepatic mass biopsy  Known pancreatic head mass again noted  Right renal urothelial thickening and enhancement, may represent chronic ureteritis, possible stricturing at the ureteropelvic junction  Bilateral pleural calcified plaques, possibly sequela of chronic asbestos exposure  Additional nonacute findings, as described  I personally discussed this study with Chris Mazariegos on 3/11/2022 at 7:56 PM                Workstation performed: [id number]         XR chest 2 views   ED Interpretation by Nidhi Mendoza DO (03/11 1815)   No pneumoperitoneium, no pneumomediastinum, no effusions or consolidations noted      Final Result by Lane Roach MD (03/12 8922)      No acute cardiopulmonary disease                    Workstation performed: [id number]               Results from last 7 days   Lab Units 03/12/22  0615 03/11/22  2203 03/11/22  1740   WBC Thousand/uL 8 56  --  9 83   HEMOGLOBIN g/dL 11 6*  --  12 1   HEMATOCRIT % 36 0*  --  37 2   PLATELETS Thousands/uL 237 245 258   NEUTROS ABS Thousands/µL 7 38  --  8 42*         Results from last 7 days   Lab Units 03/12/22  0615 03/11/22  1740   SODIUM mmol/L 138 139   POTASSIUM mmol/L 4 2 4 3   CHLORIDE mmol/L 105 102   CO2 mmol/L 24 28   ANION GAP mmol/L 9 9   BUN mg/dL 17 21   CREATININE mg/dL 0 98 1 22   EGFR ml/min/1 73sq m 73 56   CALCIUM mg/dL 8 5 8 6     Results from last 7 days   Lab Units 03/12/22  0615 03/11/22  1740   AST U/L 20 20   ALT U/L 30 34   ALK PHOS U/L 79 86   TOTAL PROTEIN g/dL 7 2 7 7   ALBUMIN g/dL 2 9* 3 1*   TOTAL BILIRUBIN mg/dL 0 45 0 35   BILIRUBIN DIRECT mg/dL  --  0 12         Results from last 7 days   Lab Units 03/12/22  0615 03/11/22  1740   GLUCOSE RANDOM mg/dL 130 172*       Results from last 7 days   Lab Units 03/11/22  2149 03/11/22  1908 03/11/22  1740   HS TNI 0HR ng/L  --   --  7   HS TNI 2HR ng/L  --  7  --    HSTNI D2 ng/L  --  0  --    HS TNI 4HR ng/L 10  --   --    HSTNI D4 ng/L 3  --   --        Results from last 7 days   Lab Units 03/11/22  1740   LIPASE u/L 36,750*       ED Treatment:   Medication Administration from 03/11/2022 1722 to 03/11/2022 2051       Date/Time Order Dose Route Action Action by Comments     03/11/2022 1741 aluminum-magnesium hydroxide-simethicone (MYLANTA) oral suspension 30 mL 30 mL Oral Given       03/11/2022 1741 Lidocaine Viscous HCl (XYLOCAINE) 2 % mucosal solution 15 mL 15 mL Swish & Spit Given                  03/11/2022 1844 sodium chloride 0 9 % bolus 500 mL 500 mL Intravenous New Bag                                Past Medical History:   Diagnosis Date    Anesthesia     "can't urinate after surgery"    Basal cell carcinoma     left arm/ right eyelid/ right hand/ above right eyebrow in past    Bladder mass     Cataract     rosalind    Coronary artery disease     CABG X 2 2004    Essential tremor     of head and hands bilat    Hearing aid worn     bilat    History of kidney stones     History of pneumonia     childhood    History of prostate cancer     History of transfusion     2017    Hx of radiation therapy     2007 for after prostate surgery    Hyperlipidemia     Hypertension     Kidney stone     in past    Myocardial infarction ([facility] [address] [facility]     Prostate CA ([facility] [address] )     2002- had prostatectomy    Scab     black scab right thumb area- biopsy taken by md recently- bandaid over area    Transitional cell bladder cancer ([facility] [address] )     had BCG    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)     and" left kidney and left ureter removed" 2017- had chemo    Urinary incontinence     wears Depends    Urinary incontinence     Wears glasses     Wears glasses     Wears partial dentures     lower     Present on Admission:   CAD (coronary artery disease)   Mild cognitive impairment      Admitting Diagnosis: Acute pancreatitis [K85 90]  Chest pain [R07 9]  Age/Sex: 64 y o  male  Admission Orders:  Scheduled Medications:  aspirin, 81 mg, Oral, Daily  heparin (porcine), 5,000 Units, Subcutaneous, Q8H [address]  melatonin, 6 mg, Oral, HS  metoprolol tartrate, 25 mg, Oral, Q12H STEPHANIE  ranolazine, 500 mg, Oral, BID      Continuous IV Infusions:  sodium chloride, 100 mL/hr, Intravenous, Continuous      PRN Meds:  HYDROmorphone, 0 5 mg, Intravenous, Q4H PRN  ondansetron, 4 mg, Intravenous, Q6H PRN  oxyCODONE, 10 mg, Oral, Q4H PRN  oxyCODONE, 5 mg, Oral, Q4H PRN    scd  Npo  oob      IP CONSULT TO GASTROENTEROLOGY    Keyanna Gonzalez RN  Network Utilization Review Department  ATTENTION: Please call with any questions or concerns to [phone] and carefully listen to the prompts so that you are directed to the right person  All voicemails are confidential   [facility] all requests for admission clinical reviews, approved or denied determinations and any other requests to dedicated fax number below belonging to the campus where the patient is receiving treatment   List of dedicated fax numbers for the Facilities:  [address] DENIALS (Administrative/Medical Necessity) [phone]   [address] (Maternity/NICU/Pediatrics) 261 [facility] [address] [facility] [place] [address] [address] [place] [address] [address] 9095 [facility] 951 [phone]

## 2022-03-12 NOTE — UTILIZATION REVIEW
Inpatient Admission Authorization Request   NOTIFICATION OF INPATIENT ADMISSION/INPATIENT AUTHORIZATION REQUEST   SERVICING FACILITY:   [address] [place] [address], [employer], [address]   Tax ID: [id number]  NPI: [id number]  Place of Service: Inpatient [address] [facility]  [address]  Place of Service Code: 24     ATTENDING PROVIDER:  Attending Name and NPI#: Tressa Miller, [place] [[id number]]  Address: [address] [place] [address] [employer], [address] [facility]  Phone: [phone]     UTILIZATION REVIEW CONTACT:  Betzaida Ray, Utilization   Network Utilization Review Department  Phone: [phone]  Fax: [phone]  Email: [email or web address]     PHYSICIAN ADVISORY SERVICES:  FOR OYPT-PS-VPDK REVIEW - MEDICAL NECESSITY DENIAL  Phone: [phone]  Fax: [phone]  Email: [email or web address]     TYPE OF REQUEST:  Inpatient Status     ADMISSION INFORMATION:  ADMISSION DATE/TIME: 3/11/22  8:25 PM  PATIENT DIAGNOSIS CODE/DESCRIPTION:  Acute pancreatitis [K85 90]  Chest pain [R07 9]  DISCHARGE DATE/TIME: No discharge date for patient encounter  IMPORTANT INFORMATION:  Please contact the Betzaida Ray directly with any questions or concerns regarding this request  Department voicemails are confidential     Send requests for admission clinical reviews, concurrent reviews, approvals, and administrative denials due to lack of clinical to fax [phone]

## 2022-03-12 NOTE — ASSESSMENT & PLAN NOTE
2* EUS/liver bx for pancreatic head mass  -Ct c/a/p concerning for acute pancreatitis with lipase of 36k  Recently diagnosed pancreatic head mass 2 6cm appears to be encasing SMV and 180deg abutting portal vein  D/w pt and wife  Npo ivf, analgesics    Given pancreatic head mass will consult GI for comanagement

## 2022-03-12 NOTE — PLAN OF CARE
Problem: Potential for Falls  Goal: Patient will remain free of falls  Description: INTERVENTIONS:  - Educate patient/family on patient safety including physical limitations  - Instruct patient to call for assistance with activity   - Consult OT/PT to assist with strengthening/mobility   - Keep Call bell within reach  - Keep bed low and locked with side rails adjusted as appropriate  - Keep care items and personal belongings within reach  - Initiate and maintain comfort rounds  - Make Fall Risk Sign visible to staff  - Offer Toileting every  Hours, in advance of need  - Initiate/Maintain alarm  - Obtain necessary fall risk management equipment:   - Apply yellow socks and bracelet for high fall risk patients  - Consider moving patient to room near nurses station  3/11/2022 2307 by Abilio Upton RN  Outcome: Progressing  3/11/2022 2307 by Abilio Upton RN  Outcome: Progressing     Problem: Nutrition/Hydration-ADULT  Goal: Nutrient/Hydration intake appropriate for improving, restoring or maintaining nutritional needs  Description: Monitor and assess patient's nutrition/hydration status for malnutrition  Collaborate with interdisciplinary team and initiate plan and interventions as ordered  Monitor patient's weight and dietary intake as ordered or per policy  Utilize nutrition screening tool and intervene as necessary  Determine patient's food preferences and provide high-protein, high-caloric foods as appropriate       INTERVENTIONS:  - Monitor oral intake, urinary output, labs, and treatment plans  - Assess nutrition and hydration status and recommend course of action  - Evaluate amount of meals eaten  - Assist patient with eating if necessary   - Allow adequate time for meals  - Recommend/ encourage appropriate diets, oral nutritional supplements, and vitamin/mineral supplements  - Order, calculate, and assess calorie counts as needed  - Recommend, monitor, and adjust tube feedings and TPN/PPN based on assessed needs  - Assess need for intravenous fluids  - Provide specific nutrition/hydration education as appropriate  - Include patient/family/caregiver in decisions related to nutrition  3/11/2022 2307 by Oralia Epps RN  Outcome: Progressing  3/11/2022 2307 by Oralia Epps RN  Outcome: Progressing     Problem: PAIN - ADULT  Goal: Verbalizes/displays adequate comfort level or baseline comfort level  Description: Interventions:  - Encourage patient to monitor pain and request assistance  - Assess pain using appropriate pain scale  - Administer analgesics based on type and severity of pain and evaluate response  - Implement non-pharmacological measures as appropriate and evaluate response  - Consider cultural and social influences on pain and pain management  - Notify physician/advanced practitioner if interventions unsuccessful or patient reports new pain  Outcome: Progressing     Problem: INFECTION - ADULT  Goal: Absence or prevention of progression during hospitalization  Description: INTERVENTIONS:  - Assess and monitor for signs and symptoms of infection  - Monitor lab/diagnostic results  - Monitor all insertion sites, i e  indwelling lines, tubes, and drains  - Monitor endotracheal if appropriate and nasal secretions for changes in amount and color  - [place] appropriate cooling/warming therapies per order  - Administer medications as ordered  - Instruct and encourage patient and family to use good hand hygiene technique  - Identify and instruct in appropriate isolation precautions for identified infection/condition  Outcome: Progressing  Goal: Absence of fever/infection during neutropenic period  Description: INTERVENTIONS:  - Monitor WBC    Outcome: Progressing     Problem: SAFETY ADULT  Goal: Patient will remain free of falls  Description: INTERVENTIONS:  - Educate patient/family on patient safety including physical limitations  - Instruct patient to call for assistance with activity   - Consult OT/PT to assist with strengthening/mobility   - Keep Call bell within reach  - Keep bed low and locked with side rails adjusted as appropriate  - Keep care items and personal belongings within reach  - Initiate and maintain comfort rounds  - Make Fall Risk Sign visible to staff  - Offer Toileting every  Hours, in advance of need  - Initiate/Maintain alarm  - Obtain necessary fall risk management equipment:   - Apply yellow socks and bracelet for high fall risk patients  - Consider moving patient to room near nurses station  3/11/2022 2307 by Josemanuel Harrington RN  Outcome: Progressing  3/11/2022 2307 by Josemanuel Harrington RN  Outcome: Progressing  Goal: Maintain or return to baseline ADL function  Description: INTERVENTIONS:  -  Assess patient's ability to carry out ADLs; assess patient's baseline for ADL function and identify physical deficits which impact ability to perform ADLs (bathing, care of mouth/teeth, toileting, grooming, dressing, etc )  - Assess/evaluate cause of self-care deficits   - Assess range of motion  - Assess patient's mobility; develop plan if impaired  - Assess patient's need for assistive devices and provide as appropriate  - Encourage maximum independence but intervene and supervise when necessary  - Involve family in performance of ADLs  - Assess for home care needs following discharge   - Consider OT consult to assist with ADL evaluation and planning for discharge  - Provide patient education as appropriate  Outcome: Progressing  Goal: Maintains/Returns to pre admission functional level  Description: INTERVENTIONS:  - Perform BMAT or MOVE assessment daily    - Set and communicate daily mobility goal to care team and patient/family/caregiver  - Collaborate with rehabilitation services on mobility goals if consulted  - Perform Range of Motion  times a day  - Reposition patient every  hours    - Dangle patient  times a day  - Stand patient  times a day  - Ambulate patient  times a day  - Out of bed to chair  times a day   - Out of bed for meals times a day  - Out of bed for toileting  - Record patient progress and toleration of activity level   Outcome: Progressing     Problem: DISCHARGE PLANNING  Goal: Discharge to home or other facility with appropriate resources  Description: INTERVENTIONS:  - Identify barriers to discharge w/patient and caregiver  - Arrange for needed discharge resources and transportation as appropriate  - Identify discharge learning needs (meds, wound care, etc )  - Arrange for interpretive services to assist at discharge as needed  - Refer to Case Management Department for coordinating discharge planning if the patient needs post-hospital services based on physician/advanced practitioner order or complex needs related to functional status, cognitive ability, or social support system  Outcome: Progressing     Problem: Knowledge Deficit  Goal: Patient/family/caregiver demonstrates understanding of disease process, treatment plan, medications, and discharge instructions  Description: Complete learning assessment and assess knowledge base    Interventions:  - Provide teaching at level of understanding  - Provide teaching via preferred learning methods  Outcome: Progressing

## 2022-03-12 NOTE — H&P
[facility]  H&P- Taryn Thompson 1945, 64 y o  male MRN: [id number]  Unit/Bed#: E5 -01 Encounter: [id number]  Primary Care Provider: Brandan Esteban MD   Date and time admitted to hospital: 3/11/2022  5:22 PM    * Acute pancreatitis after endoscopic retrograde cholangiopancreatography (ERCP)  Assessment & Plan  2* EUS/liver bx for pancreatic head mass  -Ct c/a/p concerning for acute pancreatitis with lipase of 36k  Recently diagnosed pancreatic head mass 2 6cm appears to be encasing SMV and 180deg abutting portal vein  D/w pt and wife  Npo ivf, analgesics  Given pancreatic head mass will consult GI for comanagement      Mild cognitive impairment  Assessment & Plan  Pt at baseline per wife  Continue to monitor/reorient    Solitary kidney, acquired  Assessment & Plan  2* malignancy  Pt just underwent ct urogram at [facility] concerning for ureteral stenosis 2* suspected urothelial carcinoma  Op f/u with [facility] hematology and uro oncology  ivf given contrast load, monitor renal indices, hold lasix    CAD (coronary artery disease)  Assessment & Plan  Hold statin for now  Continue bb, asa, ranexa      VTE Prophylaxis: Heparin  / sequential compression device   Code Status: fc  POLST: There is no POLST form on file for this patient (pre-hospital)  Discussion with family:     Anticipated Length of Stay:  Patient will be admitted on an Inpatient basis with an anticipated length of stay of  Greater than 2 midnights  Justification for Hospital Stay: pancreatitis    Total Time for Visit, including Counseling / Coordination of Care: 45 minutes  Greater than 50% of this total time spent on direct patient counseling and coordination of care      Chief Complaint:   pancreatitis    History of Present Illness:    Taryn Thompson is a 64 y o  male who presents with pmh of cad, solitary kidney status due to [address], bladder ca and recently suspected urothelial carcinoma and recently diagnosed pancreatic head mass s/p EUS on 3/11/22 coming to hospital for abd pain  Pt has been losing about 20-25 pounds over couple of months  This was initially felt due to be starting on metformin, but underwent ct urogram to evaluate for recurrence of prior malignancy  There was concern at that time for urothelial malignancy as well as incidentally noted pancreatic head mass  Pt underwent egd and eus on 3/11/22 at [facility] confirming pancreatic head mass possibly abutting vs entrapping splenic vein  He was doing well until 3 hours pta where he developed acute onset epigastric/lower cp radiating to back and b/l shoulders  He thought this may be angina at first given hx of cad  No n/v/f/c  Pain has since improved  Pt came to ed for evaluation where he was found to have acute pancreatitis  Ct c/a/p confirmed as well that pancreatic mass appears to be abutting the SMV and abutting the portal vein both by < 180 degrees of encasement  Admission was requested for acute pancreatitis  Review of Systems:    Review of Systems   Constitutional: Positive for unexpected weight change  Cardiovascular: Positive for chest pain  Gastrointestinal: Positive for abdominal pain  All other systems reviewed and are negative        Past Medical and Surgical History:     Past Medical History:   Diagnosis Date    Anesthesia     "can't urinate after surgery"    Basal cell carcinoma     left arm/ right eyelid/ right hand/ above right eyebrow in past    Bladder mass     Cataract     rosalind    Coronary artery disease     CABG X 2 2004    Essential tremor     of head and hands bilat    Hearing aid worn     bilat    History of kidney stones     History of pneumonia     childhood    History of prostate cancer     History of transfusion     2017    Hx of radiation therapy     2007 for after prostate surgery    Hyperlipidemia     Hypertension     Kidney stone     in past    Myocardial infarction [facility])     Prostate CA ([facility] [address] )     2002- had prostatectomy    Scab     black scab right thumb area- biopsy taken by md recently- bandaid over area    Transitional cell bladder cancer ([facility] [address] )     had BCG    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)     and" left kidney and left ureter removed" 2017- had chemo    Urinary incontinence     wears Depends    Urinary incontinence     Wears glasses     Wears glasses     Wears partial dentures     lower       Past Surgical History:   Procedure Laterality Date    APPENDECTOMY      BACK SURGERY      lumbar herniated disc repair    CARDIAC CATHETERIZATION      COLONOSCOPY      CORONARY ARTERY BYPASS GRAFT      x2 in 2004   [address] N/A 7/3/2017    Procedure: BLADDER BIOPSY;  Surgeon: Chelsi Stoll MD;  [facility];  Service: Urology    CYSTOSCOPY W/ RETROGRADES Right 7/3/2017    Procedure: CYSTOSCOPY WITH RETROGRADE PYELOGRAM;  Surgeon: Chelsi Stoll MD;  [facility];  Service: Urology   [address] [facility]      groin    NEPHRECTOMY Left     and left ureter    NM CYSTOURETHROSCOPY,FULGUR <0 5 CM LESN Right 10/23/2018    Procedure: CYSTO, RETROGRADE, TURBT;  Surgeon: Chelsi Stoll MD;  [facility];  Service: Urology    NM INSTILL ANTICANCER AGENT IN BLADDER N/A 10/23/2018    Procedure: INSTILLATION MITOMYCIN;  Surgeon: Chelsi Stoll MD;  [facility];  Service: Urology    [address] Left     SHOULDER SURGERY      dislocation    SKIN CANCER EXCISION  2011    left arm and right eyebrow       Meds/Allergies:    Prior to Admission medications    Medication Sig Start Date End Date Taking?  Authorizing Provider   aspirin 81 mg chewable tablet Chew 81 mg daily Will stop 10/16    Yes Historical Provider, MD   atorvastatin (LIPITOR) 20 mg tablet Take 1 tablet (20 mg total) by mouth daily 3/2/22  Yes Rajani Scott MD   cycloSPORINE (RESTASIS) 0 05 % ophthalmic emulsion Apply 1 drop to eye 2 (two) times a day     Yes Historical Provider, MD   furosemide (LASIX) 20 mg tablet Take 20 mg by mouth as needed     Yes Historical Provider, MD   metoprolol tartrate (LOPRESSOR) 25 mg tablet Take 1 tablet (25 mg total) by mouth every 12 (twelve) hours 1/31/22  Yes Martín Israel MD   Multiple Vitamin (MULTIVITAMIN) tablet Take 1 tablet by mouth daily   Yes Historical Provider, MD   Multiple Vitamins-Minerals (PRESERVISION AREDS 2 PO) Take by mouth   Yes Historical Provider, MD   nitroglycerin (NITROSTAT) 0 4 mg SL tablet Place 1 tablet (0 4 mg total) under the tongue every 5 (five) minutes as needed for chest pain 9/28/21  Yes Martín Israel MD   ranolazine (RANEXA) 500 mg 12 hr tablet Take 1 tablet (500 mg total) by mouth 2 (two) times a day 3/16/21  Yes Martín Israel MD   Cyanocobalamin (B-12) 1000 MCG CAPS Take by mouth daily  Patient not taking: Reported on 9/28/2021    Historical Provider, MD   isosorbide mononitrate (IMDUR) 30 mg 24 hr tablet Take 1 tablet (30 mg total) by mouth daily  Patient not taking: Reported on 1/10/2022  3/9/21   Martín Israel MD     I have reviewed home medications with patient personally  Allergies:    Allergies   Allergen Reactions    Levaquin [Levofloxacin] Rash    Percocet [Oxycodone-Acetaminophen] GI Intolerance     Constipation,,,happens with narcotics like percocet    Codeine Other (See Comments)    Lovastatin Other (See Comments)     myopathy       Social History:     Marital Status: /Civil Union   Occupation:   Patient Pre-hospital Living Situation:   Patient Pre-hospital Level of Mobility:   Patient Pre-hospital Diet Restrictions:   Substance Use History:   Social History     Substance and Sexual Activity   Alcohol Use Yes    Comment: few x year     Social History     Tobacco Use   Smoking Status Never Smoker   Smokeless Tobacco Never Used     Social History     Substance and Sexual Activity   Drug Use No       Family History:    Family History   Problem Relation Age of Onset    No Known Problems Mother     No Known Problems Father        Physical Exam:     Vitals:   Blood Pressure: 131/69 (03/11/22 2010)  Pulse: 88 (03/11/22 2010)  Temperature: 97 9 °F (36 6 °C) (03/11/22 1729)  Temp Source: Oral (03/11/22 1729)  Respirations: 12 (03/11/22 2010)  Height: 5' 10" (177 8 cm) (03/11/22 1729)  Weight - Scale: 64 4 kg (142 lb) (03/11/22 1729)  SpO2: 98 % (03/11/22 2010)    Physical Exam  Vitals reviewed  Constitutional:       General: He is not in acute distress  Appearance: He is not ill-appearing, toxic-appearing or diaphoretic  HENT:      Head: Normocephalic and atraumatic  Right Ear: External ear normal       Left Ear: External ear normal    Eyes:      Extraocular Movements: Extraocular movements intact  Cardiovascular:      Rate and Rhythm: Normal rate and regular rhythm  Heart sounds: No murmur heard  No friction rub  No gallop  Pulmonary:      Effort: No respiratory distress  Breath sounds: No wheezing, rhonchi or rales  Abdominal:      General: There is no distension  Palpations: Abdomen is soft  There is no mass  Tenderness: There is abdominal tenderness (epigastric ttp)  There is no guarding or rebound  Hernia: No hernia is present  Musculoskeletal:      Cervical back: Normal range of motion  Right lower leg: No edema  Left lower leg: No edema  Skin:     General: Skin is warm and dry  Neurological:      Mental Status: He is alert  Mental status is at baseline  Psychiatric:         Mood and Affect: Mood normal          (  Be Sure to Include Physical Exam: Delete this entire line when you have entered your exam)    Additional Data:     Lab Results: I have personally reviewed pertinent reports        Results from last 7 days   Lab Units 03/11/22  1740   WBC Thousand/uL 9 83   HEMOGLOBIN g/dL 12 1   HEMATOCRIT % 37 2   PLATELETS Thousands/uL 258   NEUTROS PCT % 87*   LYMPHS PCT % 6*   MONOS PCT % 6   EOS PCT % 0 Results from last 7 days   Lab Units 03/11/22  1740   SODIUM mmol/L 139   POTASSIUM mmol/L 4 3   CHLORIDE mmol/L 102   CO2 mmol/L 28   BUN mg/dL 21   CREATININE mg/dL 1 22   ANION GAP mmol/L 9   CALCIUM mg/dL 8 6   ALBUMIN g/dL 3 1*   TOTAL BILIRUBIN mg/dL 0 35   ALK PHOS U/L 86   ALT U/L 34   AST U/L 20   GLUCOSE RANDOM mg/dL 172*                       Imaging: I have personally reviewed pertinent reports  CT chest abdomen pelvis w contrast   ED Interpretation by Payam Lemos DO (03/11 2018)     FINDINGS:     CHEST     LUNGS:  A few small calcified granulomas  No suspicious mass or focal consolidation  There is no tracheal or endobronchial lesion      PLEURA:  Scattered pleural calcifications        HEART/GREAT VESSELS: Thoracic aortic and coronary atherosclerosis  No thoracic aortic aneurysm      MEDIASTINUM AND BIJU:  Calcified right hilar lymph nodes      CHEST WALL AND LOWER NECK:   Unremarkable      ABDOMEN     LIVER/BILIARY TREE:  One or more subcentimeter sharply circumscribed low-density hepatic lesion(s) are noted, too small to accurately characterize, but statistically most likely to represent subcentimeter hepatic cysts  No suspicious solid hepatic   lesion is identified  Hepatic contours are normal   No biliary dilatation      GALLBLADDER:  No calcified gallstones  No pericholecystic inflammatory change      SPLEEN:  Calcified granulomata are noted in the spleen  No suspicious splenic mass      PANCREAS:  2 7 cm heterogeneous pancreatic head mass  This ab   uts the superior mesenteric vein, with less than 180 degrees encasement  This does not involve the celiac artery or SMA  This abuts the main portal vein with less than 180 degrees   encasement  Mild peripancreatic edema      ADRENAL GLANDS:  Unremarkable      KIDNEYS/URETERS:  Status post left nephrectomy  Multiple right renal cysts    There is right kidney urothelial thickening and enhancement at the renal pelvis, with associated inflammatory stranding  Narrowing of the proximal ureter      STOMACH AND BOWEL:  Fecal impaction at the rectum      APPENDIX:  There are expected postoperative changes of appendectomy      ABDOMINOPELVIC CAVITY:  No ascites  No pneumoperitoneum  No lymphadenopathy      VESSELS:  Unremarkable for patient's age      PELVIS     REPRODUCTIVE ORGANS:  Status post prostatectomy      URINARY BLADDER:  Unremarkable      ABDOMINAL WALL/INGUINAL REGIONS:  Unremarkable      OSSEOUS STRUCTURES:  No acute fracture or destructive osseous lesion  Spinal degenerativ   e changes are noted  Degenerative changes of both sacroiliac joints with mixed sclerotic appearance of the bilateral iliac bones adjacent to the sacroiliac joints,   may represent degenerative or chronic stress change         IMPRESSION:     Mild peripancreatic fat stranding and edema about the body and tail, likely secondary to acute pancreatitis in the setting of recent endoscopic ultrasound and hepatic mass biopsy      Known pancreatic head mass again noted      Right renal urothelial thickening and enhancement, may represent chronic ureteritis, possible stricturing at the ureteropelvic junction      Bilateral pleural calcified plaques, possibly sequela of chronic asbestos exposure      Additional nonacute findings, as described  Final Result by Heike Han DO (03/11 1957)      Mild peripancreatic fat stranding and edema about the body and tail, likely secondary to acute pancreatitis in the setting of recent endoscopic ultrasound and hepatic mass biopsy  Known pancreatic head mass again noted  Right renal urothelial thickening and enhancement, may represent chronic ureteritis, possible stricturing at the ureteropelvic junction  Bilateral pleural calcified plaques, possibly sequela of chronic asbestos exposure  Additional nonacute findings, as described                  I personally discussed this study with [address] [place] [address] on 3/11/2022 at 7:56 PM                Workstation performed: [id number]         XR chest 2 views   ED Interpretation by Kizzy Shi DO (03/11 1815)   No pneumoperitoneium, no pneumomediastinum, no effusions or consolidations noted          EKG, Pathology, and Other Studies Reviewed on Admission:   · EKG:     Allscripts / Epic Records Reviewed: Yes     ** Please Note: This note has been constructed using a voice recognition system   **

## 2022-03-12 NOTE — ASSESSMENT & PLAN NOTE
Lab Results   Component Value Date    HGBA1C 6 9 (H) 11/08/2021     A1c is at goal  Allow higher A1c goal if needed due to age and comorbidities  Watch for hypoglycemia

## 2022-03-12 NOTE — MALNUTRITION/BMI
This medical record reflects one or more clinical indicators suggestive of malnutrition and/or morbid obesity  Malnutrition Findings:   Adult Malnutrition type: Chronic illness  Adult Degree of Malnutrition: Other severe protein calorie malnutrition  Malnutrition Characteristics: Fat loss,Muscle loss,Inadequate energy,Weight loss (Pt with s/s of malnutrition  Pt with 14 5% unintended wt loss x 6 months  Loss of fat mass: orbitals somewhat hollow, loss in UE's and loss of muscle mass: clavicle protruding,in UE's)                  360 Statement: PCM r/t poor po intake d/t cancer AEB Pt with s/s of malnutrition  Pt with 14 5% unintended wt loss x 6 months  Loss of fat mass: orbitals somewhat hollow, loss in UE's and loss of muscle mass: clavicle protruding,in UE's    BMI Findings: Body mass index is 20 37 kg/m²  See Nutrition note dated 03/12/2022 for additional details  Completed nutrition assessment is viewable in the nutrition documentation

## 2022-03-12 NOTE — CONSULTS
Consultation - [address] [facility]  Jarred Cotter 64 y o  male MRN: [id number]  Unit/Bed#: E5 -01 Encounter: [id number]        Inpatient consult to gastroenterology  Consult performed by: Rosario Adams PA-C  Consult ordered by: Yovana Noonan PA-C          Reason for Consult / Principal Problem:     1  Acute pancreatitis      ASSESSMENT AND PLAN:      Ho Cornejo is a 65 y/o male with pancreatic mass s/p EUS w FNA 3/11, hx of renal cell carcinoma, hx of prostate cancer with HTN, HLD, DM2, CAD who presents to the ED with chest pain  1  Acute pancreatitis   2  Pancreatic neck mass   Pt was noted to have pancreatic mass on imaging and underwent EGD-EUS w FNA of pancreatic neck mass at [facility] 3/11  Pt notes that shortly after this, he started to have CP that radiated to [address] and back  CT in the ED noted peripancreatic fat stranding and edema + [place] [id number]; LFTs WNL and no signs of GB stones or sludge on CT  No elevated WBC or fever  Calcium is WNL  Pt denies any new meds or supplements; and denies alcohol use  Today, pt notes his pain has decreased significantly but is still mildly present  Pt is interested in a diet  -explained to pt that his pancreatitis is most-likely from post-pancreatic instrumentation with EUS and FNA however will get lipid panel to rule out hyperTGL, although likely low yield   -monitor abdominal exam  -keep NPO for now  -trial of clear liquid diet for lunch/dinner; if he experiences worsening pain, will go back to NPO  -IV hydration  -pain control per SLIM  -follow-up pathology results with [facility]   ______________________________________________________________________    HPI:  Ho Cornejo is a 65 y/o male with pancreatic mass s/p EUS w FNA 3/11, hx of renal cell carcinoma, hx of prostate cancer with HTN, HLD, DM2, CAD who presents to the ED with chest pain  Pt underwent EGD-EUS w FNA of pancreatic mass yesterday at [facility]   Pt says that shortly after this, he started to feel chest pain that radiated to [address] and to his back  CT in the ED depicted peripancreatic fat stranding and edema with lipase of >35K  Today, pt says the pain is still present but is "much better " Pt says he is hungry and is interested in a diet  Pt denies starting any new meds or OTC supplements prior to this  Pt denies alcohol, tobacco, illicit drug use  Pt has never fernando diagnosed with pancreatitis in the past  Pt denies n/v, lower abdominal pain, constipation, diarrhea, bloody or black BMs  REVIEW OF SYSTEMS:    CONSTITUTIONAL: Denies any fever, chills, rigors, and weight loss  HEENT: No earache or tinnitus  Denies hearing loss or visual disturbances  CARDIOVASCULAR: No chest pain or palpitations  RESPIRATORY: Denies any cough, hemoptysis, shortness of breath or dyspnea on exertion  GASTROINTESTINAL: As noted in the History of Present Illness  GENITOURINARY: No problems with urination  Denies any hematuria or dysuria  NEUROLOGIC: No dizziness or vertigo, denies headaches  MUSCULOSKELETAL: Denies any muscle or joint pain  SKIN: Denies skin rashes or itching  ENDOCRINE: Denies excessive thirst  Denies intolerance to heat or cold  PSYCHOSOCIAL: Denies depression or anxiety  Denies any recent memory loss         Historical Information   Past Medical History:   Diagnosis Date    Anesthesia     "can't urinate after surgery"    Basal cell carcinoma     left arm/ right eyelid/ right hand/ above right eyebrow in past    Bladder mass     Cataract     rosalind    Coronary artery disease     CABG X 2 2004    Essential tremor     of head and hands bilat    Hearing aid worn     bilat    History of kidney stones     History of pneumonia     childhood    History of prostate cancer     History of transfusion     2017    Hx of radiation therapy     2007 for after prostate surgery    Hyperlipidemia     Hypertension     Kidney stone     in past    Myocardial infarction [facility] [address] )  Prostate CA ([facility] [address] )     2002- had prostatectomy    Scab     black scab right thumb area- biopsy taken by md recently- bandaid over area    Transitional cell bladder cancer ([facility] [address] )     had BCG    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)     and" left kidney and left ureter removed" 2017- had chemo    Urinary incontinence     wears Depends    Urinary incontinence     Wears glasses     Wears glasses     Wears partial dentures     lower     Past Surgical History:   Procedure Laterality Date    APPENDECTOMY      BACK SURGERY      lumbar herniated disc repair    CARDIAC CATHETERIZATION      COLONOSCOPY      CORONARY ARTERY BYPASS GRAFT      x2 in 2004   [address] N/A 7/3/2017    Procedure: BLADDER BIOPSY;  Surgeon: Jessica Junior MD;  [facility];  Service: Urology    CYSTOSCOPY W/ RETROGRADES Right 7/3/2017    Procedure: CYSTOSCOPY WITH RETROGRADE PYELOGRAM;  Surgeon: Jessica Junior MD;  [facility];  Service: Urology    HERNIA REPAIR      groin    NEPHRECTOMY Left     and left ureter    AK CYSTOURETHROSCOPY,FULGUR <0 5 CM LESN Right 10/23/2018    Procedure: CYSTO, RETROGRADE, TURBT;  Surgeon: Jessica Junior MD;  [facility];  Service: Urology    AK INSTILL ANTICANCER AGENT IN BLADDER N/A 10/23/2018    Procedure: Sandeep Bryant;  Surgeon: Jessica Junior MD;  [facility];  Service: Urology    PROSTATECTOMY      ROTATOR CUFF REPAIR Left     SHOULDER SURGERY      dislocation    SKIN CANCER EXCISION  2011    left arm and right eyebrow     Social History   Social History     Substance and Sexual Activity   Alcohol Use Never    Comment: few x year     Social History     Substance and Sexual Activity   Drug Use No     Social History     Tobacco Use   Smoking Status Never Smoker   Smokeless Tobacco Never Used     Family History   Problem Relation Age of Onset    No Known Problems Mother     No Known Problems Father Meds/Allergies     Medications Prior to Admission   Medication    aspirin 81 mg chewable tablet    atorvastatin (LIPITOR) 20 mg tablet    Cyanocobalamin (B-12) 1000 MCG CAPS    cycloSPORINE (RESTASIS) 0 05 % ophthalmic emulsion    furosemide (LASIX) 20 mg tablet    metoprolol tartrate (LOPRESSOR) 25 mg tablet    Multiple Vitamin (MULTIVITAMIN) tablet    Multiple Vitamins-Minerals (PRESERVISION AREDS 2 PO)    nitroglycerin (NITROSTAT) 0 4 mg SL tablet    ranolazine (RANEXA) 500 mg 12 hr tablet    isosorbide mononitrate (IMDUR) 30 mg 24 hr tablet     Current Facility-Administered Medications   Medication Dose Route Frequency    aspirin chewable tablet 81 mg  81 mg Oral Daily    heparin (porcine) subcutaneous injection 5,000 Units  5,000 Units Subcutaneous Q8H [address]    HYDROmorphone (DILAUDID) injection 0 5 mg  0 5 mg Intravenous Q4H PRN    melatonin tablet 6 mg  6 mg Oral HS    metoprolol tartrate (LOPRESSOR) tablet 25 mg  25 mg Oral Q12H STEPHANIE    ondansetron (ZOFRAN) injection 4 mg  4 mg Intravenous Q6H PRN    oxyCODONE (ROXICODONE) immediate release tablet 10 mg  10 mg Oral Q4H PRN    oxyCODONE (ROXICODONE) IR tablet 5 mg  5 mg Oral Q4H PRN    ranolazine (RANEXA) 12 hr tablet 500 mg  500 mg Oral BID    sodium chloride 0 9 % infusion  100 mL/hr Intravenous Continuous       Allergies   Allergen Reactions    Levaquin [Levofloxacin] Rash    Percocet [Oxycodone-Acetaminophen] GI Intolerance     Constipation,,,happens with narcotics like percocet    Codeine Other (See Comments)    Lovastatin Other (See Comments)     myopathy           Objective     Blood pressure 141/75, pulse 63, temperature 97 9 °F (36 6 °C), temperature source Oral, resp  rate 18, height 5' 10" (1 778 m), weight 64 4 kg (142 lb), SpO2 97 %  Body mass index is 20 37 kg/m²        Intake/Output Summary (Last 24 hours) at 3/12/2022 0830  Last data filed at 3/11/2022 2157  Gross per 24 hour   Intake 1480 ml   Output --   Net 1480          PHYSICAL EXAM:      General Appearance:   Alert, cooperative, no distress   HEENT:   Normocephalic, atraumatic, anicteric      Neck:  Supple, symmetrical, trachea midline   Lungs:   Clear to auscultation bilaterally; no rales, rhonchi or wheezing; respirations unlabored    Heart[de-identified]   Regular rate and rhythm; no murmur, rub, or gallop     Abdomen:   Soft, non-tender, non-distended; normal bowel sounds; no masses, no organomegaly    Genitalia:   Deferred    Rectal:   Deferred    Extremities:  No cyanosis, clubbing or edema    Pulses:  2+ and symmetric all extremities    Skin:  No jaundice, rashes, or lesions    Lymph nodes:  No palpable cervical lymphadenopathy        Lab Results:   Admission on 03/11/2022   Component Date Value    WBC 03/11/2022 9 83     RBC 03/11/2022 4 18     Hemoglobin 03/11/2022 12 1     Hematocrit 03/11/2022 37 2     MCV 03/11/2022 89     MCH 03/11/2022 28 9     MCHC 03/11/2022 32 5     RDW 03/11/2022 17 2*    MPV 03/11/2022 10 2     Platelets 28/03/0616 258     nRBC 03/11/2022 0     Neutrophils Relative 03/11/2022 87*    Immat GRANS % 03/11/2022 1     Lymphocytes Relative 03/11/2022 6*    Monocytes Relative 03/11/2022 6     Eosinophils Relative 03/11/2022 0     Basophils Relative 03/11/2022 0     Neutrophils Absolute 03/11/2022 8 42*    Immature Grans Absolute 03/11/2022 0 10     Lymphocytes Absolute 03/11/2022 0 63     Monocytes Absolute 03/11/2022 0 63     Eosinophils Absolute 03/11/2022 0 03     Basophils Absolute 03/11/2022 0 02     Sodium 03/11/2022 139     Potassium 03/11/2022 4 3     Chloride 03/11/2022 102     CO2 03/11/2022 28     ANION GAP 03/11/2022 9     BUN 03/11/2022 21     Creatinine 03/11/2022 1 22     Glucose 03/11/2022 172*    Calcium 03/11/2022 8 6     eGFR 03/11/2022 56     hs TnI 0hr 03/11/2022 7     Ventricular Rate 03/11/2022 90     Atrial Rate 03/11/2022 75     QRSD Interval 03/11/2022 158     QT Interval 03/11/2022 416     QTC Interval 03/11/2022 508     QRS Axis 03/11/2022 -32     T Wave Axis 03/11/2022 48     hs TnI 2hr 03/11/2022 7     Delta 2hr hsTnI 03/11/2022 0     Lipase 03/11/2022 36,750*    Total Bilirubin 03/11/2022 0 35     Bilirubin, Direct 03/11/2022 0 12     Alkaline Phosphatase 03/11/2022 86     AST 03/11/2022 20     ALT 03/11/2022 34     Total Protein 03/11/2022 7 7     Albumin 03/11/2022 3 1*    hs TnI 4hr 03/11/2022 10     Delta 4hr hsTnI 03/11/2022 3     Platelets 59/57/6460 245     MPV 03/11/2022 10 0     Ventricular Rate 03/11/2022 91     Atrial Rate 03/11/2022 91     NC Interval 03/11/2022 348     QRSD Interval 03/11/2022 158     QT Interval 03/11/2022 444     QTC Interval 03/11/2022 546     P Axis 03/11/2022 60     QRS Axis 03/11/2022 -51     T Wave Axis 03/11/2022 50     Sodium 03/12/2022 138     Potassium 03/12/2022 4 2     Chloride 03/12/2022 105     CO2 03/12/2022 24     ANION GAP 03/12/2022 9     BUN 03/12/2022 17     Creatinine 03/12/2022 0 98     Glucose 03/12/2022 130     Calcium 03/12/2022 8 5     Corrected Calcium 03/12/2022 9 4     AST 03/12/2022 20     ALT 03/12/2022 30     Alkaline Phosphatase 03/12/2022 79     Total Protein 03/12/2022 7 2     Albumin 03/12/2022 2 9*    Total Bilirubin 03/12/2022 0 45     eGFR 03/12/2022 73     WBC 03/12/2022 8 56     RBC 03/12/2022 4 15     Hemoglobin 03/12/2022 11 6*    Hematocrit 03/12/2022 36 0*    MCV 03/12/2022 87     MCH 03/12/2022 28 0     MCHC 03/12/2022 32 2     RDW 03/12/2022 17 5*    MPV 03/12/2022 9 8     Platelets 53/18/0247 237     nRBC 03/12/2022 0     Neutrophils Relative 03/12/2022 86*    Immat GRANS % 03/12/2022 1     Lymphocytes Relative 03/12/2022 7*    Monocytes Relative 03/12/2022 6     Eosinophils Relative 03/12/2022 0     Basophils Relative 03/12/2022 0     Neutrophils Absolute 03/12/2022 7 38     Immature Grans Absolute 03/12/2022 0 05     Lymphocytes Absolute 03/12/2022 0 60  Monocytes Absolute 03/12/2022 0 49     Eosinophils Absolute 03/12/2022 0 03     Basophils Absolute 03/12/2022 0 01        Imaging Studies: I have personally reviewed pertinent imaging studies

## 2022-03-12 NOTE — PROGRESS NOTES
[address]  Progress Note - [place] 1945, 64 y o  male MRN: [id number]  Unit/Bed#: E5 -01 Encounter: [id number]  Primary Care Provider: Danny Mcgregor MD   Date and time admitted to hospital: 3/11/2022  5:22 PM    * Acute pancreatitis after endoscopic retrograde cholangiopancreatography (ERCP)  Assessment & Plan  · Acute pancreatitis secondary to pancreatic head mass and recent ERCP  · Clinically improving  · GI evaluation reviewed  · Agree with advancing diet slowly and observing for symptoms  · Continue IV fluid hydration and cautious pain control  · Repeat labs in a m  Diabetes mellitus [facility])  Assessment & Plan  Lab Results   Component Value Date    HGBA1C 6 9 (H) 11/08/2021     A1c is at goal  Allow higher A1c goal if needed due to age and comorbidities  Watch for hypoglycemia      CAD (coronary artery disease)  Assessment & Plan  Stable  Continue aspirin, metoprolol and Ranexa  Statin was held appropriately in setting of pancreatitis    Mild cognitive impairment  Assessment & Plan  Watch for delirium/confusion      Solitary kidney, acquired  Assessment & Plan  Renal function stable after contrast exposure  See repeat labs in a m  VTE Pharmacologic Prophylaxis:   Pharmacologic: Heparin  Mechanical VTE Prophylaxis in Place: No    Patient Centered Rounds: I have performed bedside rounds with nursing staff today  Discussions with Specialists or Other Care Team Provider:  H&P reviewed    Education and Discussions with Family / Patient:  Spoke with wife Beatrice oviedo and gave update    Time Spent for Care: 20 minutes  More than 50% of total time spent on counseling and coordination of care as described above      Current Length of Stay: 1 day(s)    Current Patient Status: Inpatient   Certification Statement: The patient will continue to require additional inpatient hospital stay due to Pancreatitis    Discharge Plan:  In 24-48 hours    Code Status: Level 1 - Full Code      Subjective:   Patient feels better today "95%"  Pain is much improved  No fever or chills  No vomiting    Objective:     Vitals:   Temp (24hrs), Av 7 °F (36 5 °C), Min:97 4 °F (36 3 °C), Max:97 9 °F (36 6 °C)    Temp:  [97 4 °F (36 3 °C)-97 9 °F (36 6 °C)] 97 4 °F (36 3 °C)  HR:  [63-94] 91  Resp:  [12-18] 18  BP: (118-141)/(67-82) 118/81  SpO2:  [94 %-99 %] 94 %  Body mass index is 20 37 kg/m²  Input and Output Summary (last 24 hours): Intake/Output Summary (Last 24 hours) at 3/12/2022 1310  Last data filed at 3/12/2022 0831  Gross per 24 hour   Intake 1480 ml   Output 450 ml   Net 1030 ml       Physical Exam:     Physical Exam  Constitutional:       Appearance: He is not ill-appearing or diaphoretic  HENT:      Head: Normocephalic and atraumatic  Nose: No rhinorrhea  Eyes:      General: No scleral icterus  Cardiovascular:      Rate and Rhythm: Regular rhythm  Heart sounds: No murmur heard  No gallop  Pulmonary:      Effort: Pulmonary effort is normal  No respiratory distress  Breath sounds: No wheezing or rales  Abdominal:      General: Abdomen is flat  Palpations: Abdomen is soft  Musculoskeletal:      Cervical back: Neck supple  Right lower leg: No edema  Left lower leg: No edema  Skin:     General: Skin is warm and dry  Neurological:      Mental Status: He is alert        Comments: Awake alert follows commands   Psychiatric:         Mood and Affect: Mood normal          Behavior: Behavior normal          Labs:    Results from last 7 days   Lab Units 22  0615   WBC Thousand/uL 8 56   HEMOGLOBIN g/dL 11 6*   HEMATOCRIT % 36 0*   PLATELETS Thousands/uL 237   NEUTROS PCT % 86*   LYMPHS PCT % 7*   MONOS PCT % 6   EOS PCT % 0     Results from last 7 days   Lab Units 22  0615   SODIUM mmol/L 138   POTASSIUM mmol/L 4 2   CHLORIDE mmol/L 105   CO2 mmol/L 24   BUN mg/dL 17   CREATININE mg/dL 0 98   ANION GAP mmol/L 9   CALCIUM mg/dL 8  5   ALBUMIN g/dL 2 9*   TOTAL BILIRUBIN mg/dL 0 45   ALK PHOS U/L 79   ALT U/L 30   AST U/L 20   GLUCOSE RANDOM mg/dL 130       * I Have Reviewed All Lab Data Listed Above  * Additional Pertinent Lab Tests Reviewed: All Labs Within Last 24 Hours Reviewed    Imaging:    Imaging Reports Reviewed Today Include:  CT chest abdomen pelvis      Recent Cultures (last 7 days):           Last 24 Hours Medication List:   Current Facility-Administered Medications   Medication Dose Route Frequency Provider Last Rate    aspirin  81 mg Oral Daily Keysha Mckeon PA-C      heparin (porcine)  5,000 Units Subcutaneous Q8H 3 [facility] MINISTERIO Mckeon PA-C      HYDROmorphone  0 5 mg Intravenous Q4H PRN Keysha Mckeon PA-C      melatonin  6 mg Oral HS Keysha Mckeon PA-C      metoprolol tartrate  25 mg Oral Q12H [address] Keysha Mckeon PA-C      ondansetron  4 mg Intravenous Q6H PRN Katrina Andrews PA-C      oxyCODONE  10 mg Oral Q4H PRN Katrina Andrews PA-C      oxyCODONE  5 mg Oral Q4H PRN Keysha Mckeon PA-C      ranolazine  500 mg Oral BID Keysha Mckeon PA-C      sodium chloride  100 mL/hr Intravenous Continuous Katrina Andrews PA-C 100 mL/hr (03/12/22 6909)        Today, Patient Was Seen By: Brandon Yao MD    ** Please Note: Dictation voice to text software may have been used in the creation of this document   **

## 2022-03-12 NOTE — ASSESSMENT & PLAN NOTE
Stable    Continue aspirin, metoprolol and Ranexa  Statin was held appropriately in setting of pancreatitis

## 2022-03-12 NOTE — ASSESSMENT & PLAN NOTE
2* malignancy    Pt just underwent ct urogram at [facility] concerning for ureteral stenosis 2* suspected urothelial carcinoma  Op f/u with [facility] hematology and uro oncology  ivf given contrast load, monitor renal indices, hold lasix

## 2022-03-12 NOTE — PLAN OF CARE
Problem: Potential for Falls  Goal: Patient will remain free of falls  Description: INTERVENTIONS:  - Educate patient/family on patient safety including physical limitations  - Instruct patient to call for assistance with activity   - Consult OT/PT to assist with strengthening/mobility   - Keep Call bell within reach  - Keep bed low and locked with side rails adjusted as appropriate  - Keep care items and personal belongings within reach  - Initiate and maintain comfort rounds  - Make Fall Risk Sign visible to staff  Problem: Nutrition/Hydration-ADULT  Goal: Nutrient/Hydration intake appropriate for improving, restoring or maintaining nutritional needs  Description: Monitor and assess patient's nutrition/hydration status for malnutrition  Collaborate with interdisciplinary team and initiate plan and interventions as ordered  Monitor patient's weight and dietary intake as ordered or per policy  Utilize nutrition screening tool and intervene as necessary  Determine patient's food preferences and provide high-protein, high-caloric foods as appropriate       INTERVENTIONS:  - Monitor oral intake, urinary output, labs, and treatment plans  - Assess nutrition and hydration status and recommend course of action  - Evaluate amount of meals eaten  - Assist patient with eating if necessary   - Allow adequate time for meals  - Recommend/ encourage appropriate diets, oral nutritional supplements, and vitamin/mineral supplements  - Order, calculate, and assess calorie counts as needed  - Recommend, monitor, and adjust tube feedings and TPN/PPN based on assessed needs  - Assess need for intravenous fluids  - Provide specific nutrition/hydration education as appropriate  - Include patient/family/caregiver in decisions related to nutrition  Outcome: Progressing     Problem: PAIN - ADULT  Goal: Verbalizes/displays adequate comfort level or baseline comfort level  Description: Interventions:  - Encourage patient to monitor pain and request assistance  - Assess pain using appropriate pain scale  - Administer analgesics based on type and severity of pain and evaluate response  - Implement non-pharmacological measures as appropriate and evaluate response  - Consider cultural and social influences on pain and pain management  - Notify physician/advanced practitioner if interventions unsuccessful or patient reports new pain  Outcome: Progressing     Problem: INFECTION - ADULT  Goal: Absence or prevention of progression during hospitalization  Description: INTERVENTIONS:  - Assess and monitor for signs and symptoms of infection  - Monitor lab/diagnostic results  - Monitor all insertion sites, i e  indwelling lines, tubes, and drains  - Monitor endotracheal if appropriate and nasal secretions for changes in amount and color  - [place] appropriate cooling/warming therapies per order  - Administer medications as ordered  - Instruct and encourage patient and family to use good hand hygiene technique  - Identify and instruct in appropriate isolation precautions for identified infection/condition  Outcome: Progressing  Goal: Absence of fever/infection during neutropenic period  Description: INTERVENTIONS:  - Monitor WBC    Outcome: Progressing     Problem: SAFETY ADULT  Goal: Patient will remain free of falls  Description: INTERVENTIONS:  - Educate patient/family on patient safety including physical limitations  - Instruct patient to call for assistance with activity   - Consult OT/PT to assist with strengthening/mobility   - Keep Call bell within reach  - Keep bed low and locked with side rails adjusted as appropriate  - Keep care items and personal belongings within reach  - Initiate and maintain comfort rounds  - Make Fall Risk Sign visible to staff  Problem: DISCHARGE PLANNING  Goal: Discharge to home or other facility with appropriate resources  Description: INTERVENTIONS:  - Identify barriers to discharge w/patient and caregiver  - Arrange for needed discharge resources and transportation as appropriate  - Identify discharge learning needs (meds, wound care, etc )  - Arrange for interpretive services to assist at discharge as needed  - Refer to Case Management Department for coordinating discharge planning if the patient needs post-hospital services based on physician/advanced practitioner order or complex needs related to functional status, cognitive ability, or social support system  Outcome: Progressing     Problem: Knowledge Deficit  Goal: Patient/family/caregiver demonstrates understanding of disease process, treatment plan, medications, and discharge instructions  Description: Complete learning assessment and assess knowledge base    Interventions:  - Provide teaching at level of understanding  - Provide teaching via preferred learning methods  Outcome: Progressing     Problem: Prexisting or High Potential for Compromised Skin Integrity  Goal: Skin integrity is maintained or improved  Description: INTERVENTIONS:  - Identify patients at risk for skin breakdown  - Assess and monitor skin integrity  - Assess and monitor nutrition and hydration status  - Monitor labs   - Assess for incontinence   - Turn and reposition patient  - Assist with mobility/ambulation  - Relieve pressure over bony prominences  - Avoid friction and shearing  - Provide appropriate hygiene as needed including keeping skin clean and dry  - Evaluate need for skin moisturizer/barrier cream  - Collaborate with interdisciplinary team   - Patient/family teaching  - Consider wound care consult   Outcome: Progressing     - Apply yellow socks and bracelet for high fall risk patients  - Consider moving patient to room near nurses station  Outcome: Progressing  Goal: Maintain or return to baseline ADL function  Description: INTERVENTIONS:  -  Assess patient's ability to carry out ADLs; assess patient's baseline for ADL function and identify physical deficits which impact ability to perform ADLs (bathing, care of mouth/teeth, toileting, grooming, dressing, etc )  - Assess/evaluate cause of self-care deficits   - Assess range of motion  - Assess patient's mobility; develop plan if impaired  - Assess patient's need for assistive devices and provide as appropriate  - Encourage maximum independence but intervene and supervise when necessary  - Involve family in performance of ADLs  - Assess for home care needs following discharge   - Consider OT consult to assist with ADL evaluation and planning for discharge  - Provide patient education as appropriate  Outcome: Progressing  Goal: Maintains/Returns to pre admission functional level  Description: INTERVENTIONS:  - Perform BMAT or MOVE assessment daily    - Set and communicate daily mobility goal to care team and patient/family/caregiver     - Collaborate with rehabilitation services on mobility goals if consulted  - Out of bed for toileting  - Record patient progress and toleration of activity level   Outcome: Progressing     - Apply yellow socks and bracelet for high fall risk patients  - Consider moving patient to room near nurses station  Outcome: Progressing

## 2022-03-13 VITALS
RESPIRATION RATE: 18 BRPM | BODY MASS INDEX: 20.33 KG/M2 | SYSTOLIC BLOOD PRESSURE: 115 MMHG | DIASTOLIC BLOOD PRESSURE: 72 MMHG | HEART RATE: 91 BPM | WEIGHT: 142 LBS | TEMPERATURE: 97.6 F | OXYGEN SATURATION: 97 % | HEIGHT: 70 IN

## 2022-03-13 LAB
ANION GAP SERPL CALCULATED.3IONS-SCNC: 9 MMOL/L (ref 4–13)
BASOPHILS # BLD AUTO: 0.02 THOUSANDS/ΜL (ref 0–0.1)
BASOPHILS NFR BLD AUTO: 0 % (ref 0–1)
BUN SERPL-MCNC: 19 MG/DL (ref 5–25)
CALCIUM SERPL-MCNC: 8.6 MG/DL (ref 8.3–10.1)
CHLORIDE SERPL-SCNC: 102 MMOL/L (ref 100–108)
CO2 SERPL-SCNC: 27 MMOL/L (ref 21–32)
CREAT SERPL-MCNC: 1.28 MG/DL (ref 0.6–1.3)
EOSINOPHIL # BLD AUTO: 0.08 THOUSAND/ΜL (ref 0–0.61)
EOSINOPHIL NFR BLD AUTO: 1 % (ref 0–6)
ERYTHROCYTE [DISTWIDTH] IN BLOOD BY AUTOMATED COUNT: 17.7 % (ref 11.6–15.1)
GFR SERPL CREATININE-BSD FRML MDRD: 53 ML/MIN/1.73SQ M
GLUCOSE SERPL-MCNC: 136 MG/DL (ref 65–140)
HCT VFR BLD AUTO: 38.1 % (ref 36.5–49.3)
HGB BLD-MCNC: 12 G/DL (ref 12–17)
IMM GRANULOCYTES # BLD AUTO: 0.05 THOUSAND/UL (ref 0–0.2)
IMM GRANULOCYTES NFR BLD AUTO: 1 % (ref 0–2)
LIPASE SERPL-CCNC: 980 U/L (ref 73–393)
LYMPHOCYTES # BLD AUTO: 0.81 THOUSANDS/ΜL (ref 0.6–4.47)
LYMPHOCYTES NFR BLD AUTO: 8 % (ref 14–44)
MCH RBC QN AUTO: 27.6 PG (ref 26.8–34.3)
MCHC RBC AUTO-ENTMCNC: 31.5 G/DL (ref 31.4–37.4)
MCV RBC AUTO: 88 FL (ref 82–98)
MONOCYTES # BLD AUTO: 0.75 THOUSAND/ΜL (ref 0.17–1.22)
MONOCYTES NFR BLD AUTO: 7 % (ref 4–12)
NEUTROPHILS # BLD AUTO: 8.77 THOUSANDS/ΜL (ref 1.85–7.62)
NEUTS SEG NFR BLD AUTO: 83 % (ref 43–75)
NRBC BLD AUTO-RTO: 0 /100 WBCS
PLATELET # BLD AUTO: 249 THOUSANDS/UL (ref 149–390)
PMV BLD AUTO: 9.9 FL (ref 8.9–12.7)
POTASSIUM SERPL-SCNC: 4 MMOL/L (ref 3.5–5.3)
RBC # BLD AUTO: 4.35 MILLION/UL (ref 3.88–5.62)
SODIUM SERPL-SCNC: 138 MMOL/L (ref 136–145)
WBC # BLD AUTO: 10.48 THOUSAND/UL (ref 4.31–10.16)

## 2022-03-13 PROCEDURE — 85025 COMPLETE CBC W/AUTO DIFF WBC: CPT | Performed by: INTERNAL MEDICINE

## 2022-03-13 PROCEDURE — 80048 BASIC METABOLIC PNL TOTAL CA: CPT | Performed by: INTERNAL MEDICINE

## 2022-03-13 PROCEDURE — 83690 ASSAY OF LIPASE: CPT | Performed by: INTERNAL MEDICINE

## 2022-03-13 PROCEDURE — 99239 HOSP IP/OBS DSCHRG MGMT >30: CPT | Performed by: INTERNAL MEDICINE

## 2022-03-13 PROCEDURE — 99232 SBSQ HOSP IP/OBS MODERATE 35: CPT | Performed by: PHYSICIAN ASSISTANT

## 2022-03-13 RX ADMIN — RANOLAZINE 500 MG: 500 TABLET, EXTENDED RELEASE ORAL at 08:27

## 2022-03-13 RX ADMIN — ASPIRIN 81 MG CHEWABLE TABLET 81 MG: 81 TABLET CHEWABLE at 08:27

## 2022-03-13 RX ADMIN — METOPROLOL TARTRATE 25 MG: 25 TABLET, FILM COATED ORAL at 08:27

## 2022-03-13 NOTE — DISCHARGE SUMMARY
[address]  Discharge- Ileana Walsh 1945, 64 y o  male MRN: [id number]  Unit/Bed#: E5 -01 Encounter: [id number]  Primary Care Provider: Henrry Beavers MD   Date and time admitted to hospital: 3/11/2022  5:22 PM    * Acute pancreatitis after endoscopic retrograde cholangiopancreatography (ERCP)  Assessment & Plan  · Acute pancreatitis secondary to pancreatic head mass and recent ERCP  · Resolved  · Currently pain-free and tolerated low-fat diet  · Lipase improved significantly from more than 36,000 on admission to 980 today  · Stable for discharge home   · Outpatient follow-up with PCP  · Outpatient follow-up with [facility] regarding his pancreatic head mass and biopsy results  Diabetes mellitus [facility])  Assessment & Plan  Lab Results   Component Value Date    HGBA1C 6 9 (H) 11/08/2021     A1c is at goal  Diet controlled and not on any medications prior to admission    CAD (coronary artery disease)  Assessment & Plan  Stable  Continue aspirin, metoprolol and Ranexa  Resume statin on discharge    Severe protein-calorie malnutrition (HCC)  Assessment & Plan  Malnutrition Findings:   Adult Malnutrition type: Chronic illness  Adult Degree of Malnutrition: Other severe protein calorie malnutrition    BMI Findings: Body mass index is 20 37 kg/m²         Mild cognitive impairment  Assessment & Plan  Stable  No events during his brief hospitalization  Patient will be going home today with wife      Solitary kidney, acquired  Assessment & Plan  Renal function stable after contrast exposure  Creatinine is at baseline      Discharging Physician / Practitioner: Avel Flores MD  PCP: Henrry Beavers MD  Admission Date:   Admission Orders (From admission, onward)     Ordered        03/11/22 2025  Inpatient Admission  Once                      Discharge Date: 03/13/22    Medical Problems             Resolved Problems  Date Reviewed: 3/13/2022    None Consultations During Hospital Stay:  · GI    Procedures Performed:   · None    Significant Findings / Test Results:   · Lipase 36,750 on admission, 980 on discharge  · CT abdomen-mild perinephric fat stranding and edema    Incidental Findings:   · Known pancreatic head mass  · Right renal urothelial thickening and enhancement, may represent chronic ureteritis, possible stricture (recent workup done at [facility] in February with CT urogram)  · 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     Test Results Pending at Discharge (will require follow up): · None     Outpatient Tests Requested:  · None    Complications:  None    Reason for Admission:  Abdominal pain    Hospital Course:     Mable Luque is a 64 y o  male patient who originally presented to the hospital on 3/11/2022 due to epigastric abdominal pain  Earlier in the day he had an endoscopic ultrasound with biopsy done at [facility] due to a pancreatic head mass  He was found to have acute pancreatitis, likely related to his recent procedure  He was treated with bowel rest, IV fluid hydration and cautious pain control  He was evaluated by GI  He responded well with interventions and did not require any further treatment  It diet was advanced and he was able to tolerate a low-fat solid diet  He is stable for discharge home today    He CT showed incidental findings mentioned above  He should follow up with his specialists as [facility] for the pancreatic head mass as well as the known ureteral thickening  He had recent workup done there last month  Please see above list of diagnoses and related plan for additional information  Condition at Discharge: good     Discharge Day Visit / Exam:     Subjective:  Feels well  Tolerated diet  No abdominal pain nausea or vomiting  Feels ready to go home    Vitals: Blood Pressure: 115/72 (03/13/22 0750)  Pulse: 91 (03/13/22 0750)  Temperature: 97 6 °F (36 4 °C) (03/13/22 0750)  Temp Source: Oral (03/12/22 1540)  Respirations: 18 (03/13/22 0300)  Height: 5' 10" (177 8 cm) (03/11/22 1729)  Weight - Scale: 64 4 kg (142 lb) (03/11/22 1729)  SpO2: 97 % (03/13/22 0750)  Exam:   Physical Exam  Constitutional:       Appearance: He is not ill-appearing or diaphoretic  HENT:      Head: Normocephalic and atraumatic  Nose: No rhinorrhea  Eyes:      General: No scleral icterus  Cardiovascular:      Rate and Rhythm: Regular rhythm  Heart sounds: No murmur heard  No gallop  Pulmonary:      Effort: Pulmonary effort is normal  No respiratory distress  Breath sounds: No wheezing or rales  Abdominal:      General: Abdomen is flat  Palpations: Abdomen is soft  Musculoskeletal:      Cervical back: Neck supple  Right lower leg: No edema  Left lower leg: No edema  Skin:     General: Skin is warm and dry  Neurological:      Mental Status: He is alert and oriented to person, place, and time  Psychiatric:         Mood and Affect: Mood normal          Behavior: Behavior normal          Discussion with Family:  Spoke with wife and gave update    Discharge instructions/Information to patient and family:   See after visit summary for information provided to patient and family  Provisions for Follow-Up Care:  See after visit summary for information related to follow-up care and any pertinent home health orders  Disposition:     Home      Planned Readmission: no     Discharge Statement:  I spent >30 minutes discharging the patient  This time was spent on the day of discharge  I had direct contact with the patient on the day of discharge  Greater than 50% of the total time was spent examining patient, answering all patient questions, arranging and discussing plan of care with patient as well as directly providing post-discharge instructions  Additional time then spent on discharge activities      Discharge Medications:  See after visit summary for reconciled discharge medications provided to patient and family        ** Please Note: This note has been constructed using a voice recognition system **

## 2022-03-13 NOTE — DISCHARGE INSTR - AVS FIRST PAGE
You were admitted for pancreatitis after your recent procedure  You were treated with IV fluid, is, bowel rest, and pain medications    You are cleared to go home but please follow a low-fat diet  Avoid acidic foods and drinks      Follow-up with your doctor at [facility] to discuss your biopsy results and further treatment options

## 2022-03-13 NOTE — INCIDENTAL FINDINGS
The following findings require follow up:  Radiographic finding   Finding: Right renal urothelial thickening and enhancement, may represent chronic ureteritis, possible stricture ring   Follow up required:   Follow-up with your specialist at [facility]      Please notify the following clinician to assist with the follow up:   Dr Dulce Castellon

## 2022-03-13 NOTE — PLAN OF CARE
Problem: Potential for Falls  Goal: Patient will remain free of falls  Description: INTERVENTIONS:  - Educate patient/family on patient safety including physical limitations  - Instruct patient to call for assistance with activity   - Consult OT/PT to assist with strengthening/mobility   - Keep Call bell within reach  - Keep bed low and locked with side rails adjusted as appropriate  - Keep care items and personal belongings within reach  - Initiate and maintain comfort rounds  - Make Fall Risk Sign visible to staff  - Offer Toileting every  Hours, in advance of need  - Initiate/Maintain alarm  - Obtain necessary fall risk management equipment:   - Apply yellow socks and bracelet for high fall risk patients  - Consider moving patient to room near nurses station  Outcome: Progressing     Problem: Nutrition/Hydration-ADULT  Goal: Nutrient/Hydration intake appropriate for improving, restoring or maintaining nutritional needs  Description: Monitor and assess patient's nutrition/hydration status for malnutrition  Collaborate with interdisciplinary team and initiate plan and interventions as ordered  Monitor patient's weight and dietary intake as ordered or per policy  Utilize nutrition screening tool and intervene as necessary  Determine patient's food preferences and provide high-protein, high-caloric foods as appropriate       INTERVENTIONS:  - Monitor oral intake, urinary output, labs, and treatment plans  - Assess nutrition and hydration status and recommend course of action  - Evaluate amount of meals eaten  - Assist patient with eating if necessary   - Allow adequate time for meals  - Recommend/ encourage appropriate diets, oral nutritional supplements, and vitamin/mineral supplements  - Order, calculate, and assess calorie counts as needed  - Recommend, monitor, and adjust tube feedings and TPN/PPN based on assessed needs  - Assess need for intravenous fluids  - Provide specific nutrition/hydration education as appropriate  - Include patient/family/caregiver in decisions related to nutrition  Outcome: Progressing     Problem: PAIN - ADULT  Goal: Verbalizes/displays adequate comfort level or baseline comfort level  Description: Interventions:  - Encourage patient to monitor pain and request assistance  - Assess pain using appropriate pain scale  - Administer analgesics based on type and severity of pain and evaluate response  - Implement non-pharmacological measures as appropriate and evaluate response  - Consider cultural and social influences on pain and pain management  - Notify physician/advanced practitioner if interventions unsuccessful or patient reports new pain  Outcome: Progressing     Problem: INFECTION - ADULT  Goal: Absence or prevention of progression during hospitalization  Description: INTERVENTIONS:  - Assess and monitor for signs and symptoms of infection  - Monitor lab/diagnostic results  - Monitor all insertion sites, i e  indwelling lines, tubes, and drains  - Monitor endotracheal if appropriate and nasal secretions for changes in amount and color  - [place] appropriate cooling/warming therapies per order  - Administer medications as ordered  - Instruct and encourage patient and family to use good hand hygiene technique  - Identify and instruct in appropriate isolation precautions for identified infection/condition  Outcome: Progressing  Goal: Absence of fever/infection during neutropenic period  Description: INTERVENTIONS:  - Monitor WBC    Outcome: Progressing     Problem: SAFETY ADULT  Goal: Patient will remain free of falls  Description: INTERVENTIONS:  - Educate patient/family on patient safety including physical limitations  - Instruct patient to call for assistance with activity   - Consult OT/PT to assist with strengthening/mobility   - Keep Call bell within reach  - Keep bed low and locked with side rails adjusted as appropriate  - Keep care items and personal belongings within reach  - Initiate and maintain comfort rounds  - Make Fall Risk Sign visible to staff  - Offer Toileting every  Hours, in advance of need  - Initiate/Maintain alarm  - Obtain necessary fall risk management equipment:   - Apply yellow socks and bracelet for high fall risk patients  - Consider moving patient to room near nurses station  Outcome: Progressing  Goal: Maintain or return to baseline ADL function  Description: INTERVENTIONS:  -  Assess patient's ability to carry out ADLs; assess patient's baseline for ADL function and identify physical deficits which impact ability to perform ADLs (bathing, care of mouth/teeth, toileting, grooming, dressing, etc )  - Assess/evaluate cause of self-care deficits   - Assess range of motion  - Assess patient's mobility; develop plan if impaired  - Assess patient's need for assistive devices and provide as appropriate  - Encourage maximum independence but intervene and supervise when necessary  - Involve family in performance of ADLs  - Assess for home care needs following discharge   - Consider OT consult to assist with ADL evaluation and planning for discharge  - Provide patient education as appropriate  Outcome: Progressing  Goal: Maintains/Returns to pre admission functional level  Description: INTERVENTIONS:  - Perform BMAT or MOVE assessment daily    - Set and communicate daily mobility goal to care team and patient/family/caregiver  - Collaborate with rehabilitation services on mobility goals if consulted  - Perform Range of Motion  times a day  - Reposition patient every  hours    - Dangle patient  times a day  - Stand patient  times a day  - Ambulate patient  times a day  - Out of bed to chair  times a day   - Out of bed for meals times a day  - Out of bed for toileting  - Record patient progress and toleration of activity level   Outcome: Progressing     Problem: DISCHARGE PLANNING  Goal: Discharge to home or other facility with appropriate resources  Description: INTERVENTIONS:  - Identify barriers to discharge w/patient and caregiver  - Arrange for needed discharge resources and transportation as appropriate  - Identify discharge learning needs (meds, wound care, etc )  - Arrange for interpretive services to assist at discharge as needed  - Refer to Case Management Department for coordinating discharge planning if the patient needs post-hospital services based on physician/advanced practitioner order or complex needs related to functional status, cognitive ability, or social support system  Outcome: Progressing     Problem: Knowledge Deficit  Goal: Patient/family/caregiver demonstrates understanding of disease process, treatment plan, medications, and discharge instructions  Description: Complete learning assessment and assess knowledge base    Interventions:  - Provide teaching at level of understanding  - Provide teaching via preferred learning methods  Outcome: Progressing     Problem: Prexisting or High Potential for Compromised Skin Integrity  Goal: Skin integrity is maintained or improved  Description: INTERVENTIONS:  - Identify patients at risk for skin breakdown  - Assess and monitor skin integrity  - Assess and monitor nutrition and hydration status  - Monitor labs   - Assess for incontinence   - Turn and reposition patient  - Assist with mobility/ambulation  - Relieve pressure over bony prominences  - Avoid friction and shearing  - Provide appropriate hygiene as needed including keeping skin clean and dry  - Evaluate need for skin moisturizer/barrier cream  - Collaborate with interdisciplinary team   - Patient/family teaching  - Consider wound care consult   Outcome: Progressing

## 2022-03-13 NOTE — ASSESSMENT & PLAN NOTE
Malnutrition Findings:   Adult Malnutrition type: Chronic illness  Adult Degree of Malnutrition: Other severe protein calorie malnutrition    BMI Findings: Body mass index is 20 37 kg/m²

## 2022-03-13 NOTE — PLAN OF CARE
Problem: Potential for Falls  Goal: Patient will remain free of falls  Description: INTERVENTIONS:  - Educate patient/family on patient safety including physical limitations  - Instruct patient to call for assistance with activity   - Consult OT/PT to assist with strengthening/mobility   - Keep Call bell within reach  - Keep bed low and locked with side rails adjusted as appropriate  - Keep care items and personal belongings within reach  - Initiate and maintain comfort rounds  - Make Fall Risk Sign visible to staff  Problem: Nutrition/Hydration-ADULT  Goal: Nutrient/Hydration intake appropriate for improving, restoring or maintaining nutritional needs  Description: Monitor and assess patient's nutrition/hydration status for malnutrition  Collaborate with interdisciplinary team and initiate plan and interventions as ordered  Monitor patient's weight and dietary intake as ordered or per policy  Utilize nutrition screening tool and intervene as necessary  Determine patient's food preferences and provide high-protein, high-caloric foods as appropriate       INTERVENTIONS:  - Monitor oral intake, urinary output, labs, and treatment plans  - Assess nutrition and hydration status and recommend course of action  - Evaluate amount of meals eaten  - Assist patient with eating if necessary   - Allow adequate time for meals  - Recommend/ encourage appropriate diets, oral nutritional supplements, and vitamin/mineral supplements  - Order, calculate, and assess calorie counts as needed  - Recommend, monitor, and adjust tube feedings and TPN/PPN based on assessed needs  - Assess need for intravenous fluids  - Provide specific nutrition/hydration education as appropriate  - Include patient/family/caregiver in decisions related to nutrition  Outcome: Progressing     Problem: PAIN - ADULT  Goal: Verbalizes/displays adequate comfort level or baseline comfort level  Description: Interventions:  - Encourage patient to monitor pain and request assistance  - Assess pain using appropriate pain scale  - Administer analgesics based on type and severity of pain and evaluate response  - Implement non-pharmacological measures as appropriate and evaluate response  - Consider cultural and social influences on pain and pain management  - Notify physician/advanced practitioner if interventions unsuccessful or patient reports new pain  Outcome: Progressing     Problem: INFECTION - ADULT  Goal: Absence or prevention of progression during hospitalization  Description: INTERVENTIONS:  - Assess and monitor for signs and symptoms of infection  - Monitor lab/diagnostic results  - Monitor all insertion sites, i e  indwelling lines, tubes, and drains  - Monitor endotracheal if appropriate and nasal secretions for changes in amount and color  - [place] appropriate cooling/warming therapies per order  - Administer medications as ordered  - Instruct and encourage patient and family to use good hand hygiene technique  - Identify and instruct in appropriate isolation precautions for identified infection/condition  Outcome: Progressing  Goal: Absence of fever/infection during neutropenic period  Description: INTERVENTIONS:  - Monitor WBC    Outcome: Progressing     Problem: SAFETY ADULT  Goal: Patient will remain free of falls  Description: INTERVENTIONS:  - Educate patient/family on patient safety including physical limitations  - Instruct patient to call for assistance with activity   - Consult OT/PT to assist with strengthening/mobility   - Keep Call bell within reach  - Keep bed low and locked with side rails adjusted as appropriate  - Keep care items and personal belongings within reach  - Initiate and maintain comfort rounds  - Make Fall Risk Sign visible to staff  - Apply yellow socks and bracelet for high fall risk patients  - Consider moving patient to room near nurses station  Outcome: Progressing  Goal: Maintain or return to baseline ADL function  Description: INTERVENTIONS:  -  Assess patient's ability to carry out ADLs; assess patient's baseline for ADL function and identify physical deficits which impact ability to perform ADLs (bathing, care of mouth/teeth, toileting, grooming, dressing, etc )  - Assess/evaluate cause of self-care deficits   - Assess range of motion  - Assess patient's mobility; develop plan if impaired  - Assess patient's need for assistive devices and provide as appropriate  - Encourage maximum independence but intervene and supervise when necessary  - Involve family in performance of ADLs  - Assess for home care needs following discharge   - Consider OT consult to assist with ADL evaluation and planning for discharge  - Provide patient education as appropriate  Outcome: Progressing  Goal: Maintains/Returns to pre admission functional level  Description: INTERVENTIONS:  - Perform BMAT or MOVE assessment daily    - Set and communicate daily mobility goal to care team and patient/family/caregiver     - Collaborate with rehabilitation services on mobility goals if consulted  Problem: DISCHARGE PLANNING  Goal: Discharge to home or other facility with appropriate resources  Description: INTERVENTIONS:  - Identify barriers to discharge w/patient and caregiver  - Arrange for needed discharge resources and transportation as appropriate  - Identify discharge learning needs (meds, wound care, etc )  - Arrange for interpretive services to assist at discharge as needed  - Refer to Case Management Department for coordinating discharge planning if the patient needs post-hospital services based on physician/advanced practitioner order or complex needs related to functional status, cognitive ability, or social support system  Outcome: Progressing     Problem: Knowledge Deficit  Goal: Patient/family/caregiver demonstrates understanding of disease process, treatment plan, medications, and discharge instructions  Description: Complete learning assessment and assess knowledge base    Interventions:  - Provide teaching at level of understanding  - Provide teaching via preferred learning methods  Outcome: Progressing     Problem: Prexisting or High Potential for Compromised Skin Integrity  Goal: Skin integrity is maintained or improved  Description: INTERVENTIONS:  - Identify patients at risk for skin breakdown  - Assess and monitor skin integrity  - Assess and monitor nutrition and hydration status  - Monitor labs   - Assess for incontinence   - Turn and reposition patient  - Assist with mobility/ambulation  - Relieve pressure over bony prominences  - Avoid friction and shearing  - Provide appropriate hygiene as needed including keeping skin clean and dry  - Evaluate need for skin moisturizer/barrier cream  - Collaborate with interdisciplinary team   - Patient/family teaching  - Consider wound care consult   Outcome: Progressing     - Out of bed for toileting  - Record patient progress and toleration of activity level   Outcome: Progressing     - Apply yellow socks and bracelet for high fall risk patients  - Consider moving patient to room near nurses station  Outcome: Progressing

## 2022-03-13 NOTE — ASSESSMENT & PLAN NOTE
Lab Results   Component Value Date    HGBA1C 6 9 (H) 11/08/2021     A1c is at goal  Diet controlled and not on any medications prior to admission

## 2022-03-13 NOTE — PROGRESS NOTES
Progress Note- Stephanie Aguilar 64 y o  male MRN: [id number]    Unit/Bed#: E5 -01 Encounter: [id number]      Assessment and Plan:    Zac Sheikh is a 65 y/o male with pancreatic mass s/p EUS w FNA 3/11, hx of renal cell carcinoma, hx of prostate cancer with HTN, HLD, DM2, CAD who presents to the ED with chest pain      1  Acute pancreatitis   2  Pancreatic neck mass   Pt was noted to have pancreatic mass on imaging and underwent EGD-EUS w FNA of pancreatic neck mass at [facility] 3/11  Pt notes that shortly after this, he started to have CP that radiated to [address] and back  CT in the ED noted peripancreatic fat stranding and edema + lipase of 01800; LFTs WNL and no signs of GB stones or sludge on CT  No elevated WBC or fever  Calcium is WNL and TGL only mildly elevated at 167  Pt denies any new meds or supplements; and denies alcohol use   Today, pt is tolerating diet without pain, n/v  Pt is anxious to go home    -continue to advance diet as tolerated but avoid fatty/greasy foods at this time  -pain control per SLIM  -follow-up pathology results with [facility]   -so long as pt remains stable, GI to sign-off     ______________________________________________________________________    Subjective:     Pt says he feels "much better" and is tolerating normal diet without pain, n/v      Medication Administration - last 24 hours from 03/12/2022 0817 to 03/13/2022 0917       Date/Time Order Dose Route Action Action by     03/13/2022 0827 aspirin chewable tablet 81 mg 81 mg Oral Given Rafia Jang RN     03/12/2022 0935 aspirin chewable tablet 81 mg 81 mg Oral Given Rafia Jang RN     03/13/2022 0827 metoprolol tartrate (LOPRESSOR) tablet 25 mg 25 mg Oral Given Rafia Jang RN     03/12/2022 2127 metoprolol tartrate (LOPRESSOR) tablet 25 mg 25 mg Oral Given Damaso Flores RN     03/12/2022 0935 metoprolol tartrate (LOPRESSOR) tablet 25 mg 25 mg Oral Given Rafia Jang RN     03/13/2022 7011 ranolazine (RANEXA) 12 hr tablet 500 mg 500 mg Oral Given Tali Britton RN     03/12/2022 1748 ranolazine (RANEXA) 12 hr tablet 500 mg 500 mg Oral Given Tali Britton RN     03/12/2022 0935 ranolazine (RANEXA) 12 hr tablet 500 mg 500 mg Oral Given Tali Britton RN     03/13/2022 0507 heparin (porcine) subcutaneous injection 5,000 Units 5,000 Units Subcutaneous Refused Evelyne Duncan RN     03/12/2022 2127 heparin (porcine) subcutaneous injection 5,000 Units 5,000 Units Subcutaneous Refused Evelyne Duncan RN     03/12/2022 1407 heparin (porcine) subcutaneous injection 5,000 Units 5,000 Units Subcutaneous Given Tali Britton RN     03/12/2022 1555 sodium chloride 0 9 % infusion 0 mL/hr Intravenous Stopped Tali Britton RN     03/12/2022 0937 sodium chloride 0 9 % infusion 100 mL/hr Intravenous Forest Miranda [id number]     03/12/2022 0935 sodium chloride 0 9 % infusion 0 mL/hr Intravenous Stopped Tali Britton RN     03/12/2022 2127 melatonin tablet 6 mg 6 mg Oral Given Evelyne Duncan RN          Objective:     Vitals: Blood pressure 115/72, pulse 91, temperature 97 6 °F (36 4 °C), resp  rate 18, height 5' 10" (1 778 m), weight 64 4 kg (142 lb), SpO2 97 %  ,Body mass index is 20 37 kg/m²  Intake/Output Summary (Last 24 hours) at 3/13/2022 7806  Last data filed at 3/12/2022 1854  Gross per 24 hour   Intake 360 ml   Output 200 ml   Net 160 ml       Physical Exam:   General Appearance: Awake and alert, in no acute distress  Abdomen: Soft, non-tender, non-distended; bowel sounds normal; no masses or no organomegaly    Invasive Devices  Report    Peripheral Intravenous Line            Peripheral IV 03/11/22 Left Antecubital 1 day          Drain            Urethral Catheter Latex 12 Fr  1313 days    Urethral Catheter Latex; Double-lumen 22 Fr  1236 days                Lab Results:  Admission on 03/11/2022   Component Date Value    WBC 03/11/2022 9 83     RBC 03/11/2022 4 18     Hemoglobin 03/11/2022 12 1     Hematocrit 03/11/2022 37 2     MCV 03/11/2022 89     MCH 03/11/2022 28 9     MCHC 03/11/2022 32 5     RDW 03/11/2022 17 2*    MPV 03/11/2022 10 2     Platelets 96/65/4031 258     nRBC 03/11/2022 0     Neutrophils Relative 03/11/2022 87*    Immat GRANS % 03/11/2022 1     Lymphocytes Relative 03/11/2022 6*    Monocytes Relative 03/11/2022 6     Eosinophils Relative 03/11/2022 0     Basophils Relative 03/11/2022 0     Neutrophils Absolute 03/11/2022 8 42*    Immature Grans Absolute 03/11/2022 0 10     Lymphocytes Absolute 03/11/2022 0 63     Monocytes Absolute 03/11/2022 0 63     Eosinophils Absolute 03/11/2022 0 03     Basophils Absolute 03/11/2022 0 02     Sodium 03/11/2022 139     Potassium 03/11/2022 4 3     Chloride 03/11/2022 102     CO2 03/11/2022 28     ANION GAP 03/11/2022 9     BUN 03/11/2022 21     Creatinine 03/11/2022 1 22     Glucose 03/11/2022 172*    Calcium 03/11/2022 8 6     eGFR 03/11/2022 56     hs TnI 0hr 03/11/2022 7     Ventricular Rate 03/11/2022 90     Atrial Rate 03/11/2022 75     QRSD Interval 03/11/2022 158     QT Interval 03/11/2022 416     QTC Interval 03/11/2022 508     QRS Axis 03/11/2022 -32     T Wave Axis 03/11/2022 48     hs TnI 2hr 03/11/2022 7     Delta 2hr hsTnI 03/11/2022 0     Lipase 03/11/2022 36,750*    Total Bilirubin 03/11/2022 0 35     Bilirubin, Direct 03/11/2022 0 12     Alkaline Phosphatase 03/11/2022 86     AST 03/11/2022 20     ALT 03/11/2022 34     Total Protein 03/11/2022 7 7     Albumin 03/11/2022 3 1*    hs TnI 4hr 03/11/2022 10     Delta 4hr hsTnI 03/11/2022 3     Platelets 75/59/2399 245     MPV 03/11/2022 10 0     Ventricular Rate 03/11/2022 91     Atrial Rate 03/11/2022 91     GA Interval 03/11/2022 348     QRSD Interval 03/11/2022 158     QT Interval 03/11/2022 444     QTC Interval 03/11/2022 546     P Axis 03/11/2022 60     QRS Axis 03/11/2022 -46     T Wave Axis 03/11/2022 50     Sodium 03/12/2022 138     Potassium 03/12/2022 4 2     Chloride 03/12/2022 105     CO2 03/12/2022 24     ANION GAP 03/12/2022 9     BUN 03/12/2022 17     Creatinine 03/12/2022 0 98     Glucose 03/12/2022 130     Calcium 03/12/2022 8 5     Corrected Calcium 03/12/2022 9 4     AST 03/12/2022 20     ALT 03/12/2022 30     Alkaline Phosphatase 03/12/2022 79     Total Protein 03/12/2022 7 2     Albumin 03/12/2022 2 9*    Total Bilirubin 03/12/2022 0 45     eGFR 03/12/2022 73     WBC 03/12/2022 8 56     RBC 03/12/2022 4 15     Hemoglobin 03/12/2022 11 6*    Hematocrit 03/12/2022 36 0*    MCV 03/12/2022 87     MCH 03/12/2022 28 0     MCHC 03/12/2022 32 2     RDW 03/12/2022 17 5*    MPV 03/12/2022 9 8     Platelets 12/81/7343 237     nRBC 03/12/2022 0     Neutrophils Relative 03/12/2022 86*    Immat GRANS % 03/12/2022 1     Lymphocytes Relative 03/12/2022 7*    Monocytes Relative 03/12/2022 6     Eosinophils Relative 03/12/2022 0     Basophils Relative 03/12/2022 0     Neutrophils Absolute 03/12/2022 7 38     Immature Grans Absolute 03/12/2022 0 05     Lymphocytes Absolute 03/12/2022 0 60     Monocytes Absolute 03/12/2022 0 49     Eosinophils Absolute 03/12/2022 0 03     Basophils Absolute 03/12/2022 0 01     Cholesterol 03/12/2022 115     Triglycerides 03/12/2022 167*    HDL, Direct 03/12/2022 23*    LDL Calculated 03/12/2022 59     Non-HDL-Chol (CHOL-HDL) 03/12/2022 92     WBC 03/13/2022 10 48*    RBC 03/13/2022 4 35     Hemoglobin 03/13/2022 12 0     Hematocrit 03/13/2022 38 1     MCV 03/13/2022 88     MCH 03/13/2022 27 6     MCHC 03/13/2022 31 5     RDW 03/13/2022 17 7*    MPV 03/13/2022 9 9     Platelets 94/16/9953 249     nRBC 03/13/2022 0     Neutrophils Relative 03/13/2022 83*    Immat GRANS % 03/13/2022 1     Lymphocytes Relative 03/13/2022 8*    Monocytes Relative 03/13/2022 7     Eosinophils Relative 03/13/2022 1     Basophils Relative 03/13/2022 0     Neutrophils Absolute 03/13/2022 8 77*    Immature Grans Absolute 03/13/2022 0 05     Lymphocytes Absolute 03/13/2022 0 81     Monocytes Absolute 03/13/2022 0 75     Eosinophils Absolute 03/13/2022 0 08     Basophils Absolute 03/13/2022 0 02     Sodium 03/13/2022 138     Potassium 03/13/2022 4 0     Chloride 03/13/2022 102     CO2 03/13/2022 27     ANION GAP 03/13/2022 9     BUN 03/13/2022 19     Creatinine 03/13/2022 1 28     Glucose 03/13/2022 136     Calcium 03/13/2022 8 6     eGFR 03/13/2022 53     Lipase 03/13/2022 980*       Imaging Studies: I have personally reviewed pertinent imaging studies

## 2022-03-13 NOTE — ASSESSMENT & PLAN NOTE
· Acute pancreatitis secondary to pancreatic head mass and recent ERCP  · Resolved  · Currently pain-free and tolerated low-fat diet  · Lipase improved significantly from more than 36,000 on admission to 980 today  · Stable for discharge home   · Outpatient follow-up with PCP  · Outpatient follow-up with [facility] regarding his pancreatic head mass and biopsy results

## 2022-03-14 NOTE — UTILIZATION REVIEW
Initial Clinical Review    Admission: Date/Time/Statement:   Admission Orders (From admission, onward)     Ordered        03/11/22 2025  Inpatient Admission  Once                      Orders Placed This Encounter   Procedures    Inpatient Admission     Standing Status:   Standing     Number of Occurrences:   1     Order Specific Question:   Level of Care     Answer:   Med Surg [16]     Order Specific Question:   Estimated length of stay     Answer:   More than 2 Midnights     Order Specific Question:   Certification     Answer:   I certify that inpatient services are medically necessary for this patient for a duration of greater than two midnights  See H&P and MD Progress Notes for additional information about the patient's course of treatment  ED Arrival Information     Expected Arrival Acuity    - 3/11/2022 17:22 Urgent         Means of arrival Escorted by Service Admission type    Ambulance [facility] Urgent         Arrival complaint    Chest Pain        Chief Complaint   Patient presents with    Chest Pain     pt had endoscopy at [facility] today  Started with chest pain approx 1300  took 2 nitro from his medication, no relief, 1 nitro prehospital slight relief        Initial Presentation: 63 yo male with pmh of cad, solitary kidney status due to [address], bladder ca to ED by ems admitted Inpatient d/t Acute pancreatitis  Recently diagnosed pancreatic head mass 2 6cm appears to be encasing SMV and 180deg abutting portal vein  just underwent ct urogram at [facility] concerning for ureteral stenosis 2* suspected urothelial carcinoma  Presented with Abdominal pain and belching  mild epigastric tenderness  Elevated lipase  Imaging shows Mild peripancreatic fat stranding and edema about the body and tail, likely secondary to acute pancreatitis in the setting of recent endoscopic ultrasound and hepatic mass biopsy  Known pancreatic head mass again noted   Right renal urothelial thickening and enhancement, may represent chronic ureteritis, possible stricturing at the ureteropelvic junction  Bilateral pleural calcified plaques, possibly sequela of chronic asbestos exposure  Endoscopic biospy of pancreas  NPO  IVF  Analgesics  GI consult  Op f/u with [facility] hematology and uro oncology       · Date: 3/12   Day 2:Clinically improving  Pain improved  advance diet slowly  IVF  Analgesics  3/12 GI Consult:pain has decreased significantly but is still mildly present  Pt is interested in a diet  pancreatitis is most-likely from post-pancreatic instrumentation with EUS and FNA however will get lipid panel to rule out hyperTGL npo  IVF  Analgesics  clear liquid diet  ED Triage Vitals [03/11/22 1729]   Temperature Pulse Respirations Blood Pressure SpO2   97 9 °F (36 6 °C) 88 16 130/67 97 %      Temp Source Heart Rate Source Patient Position - Orthostatic VS BP Location FiO2 (%)   Oral Monitor Lying Right arm --      Pain Score       5          Wt Readings from Last 1 Encounters:   03/11/22 64 4 kg (142 lb)     Additional Vital Signs:   03/12/22 08:31:40 97 4 °F (36 3 °C) Abnormal  91 -- 118/81 93 94 % -- --   03/11/22 23:05:38 -- 63 18 141/75 97 97 % -- --   03/11/22 22:01:22 -- 94 -- 136/82 100 96 % -- --   03/11/22 2158 -- 94 -- 136/82 -- -- -- --   03/11/22 2010 -- 88 12 131/69 -- 98 % None (Room air) Lying   03/11/22 1911 -- 64 18 128/69 -- 99 % None (Room air) Lying   03/11/22 1729 97 9 °F (36 6 °C) 88 16 130/67 -- 97 % None (Room air) Lying       Pertinent Labs/Diagnostic Test Results:   3/11 EKG:  ECG 12 Lead Documentation Only     Date/Time: 3/11/2022 5:35 PM  Performed by: Kirill Rolon DO  Authorized by:  Kirill Rolon DO      Indications / Diagnosis:  Cp  ECG reviewed by me, the ED Provider: yes    Patient location:  ED  Previous ECG:     Previous ECG:  Compared to current    Comparison to cardiac monitor: No    Interpretation:     Interpretation: abnormal    Rate:     ECG rate: 90    ECG rate assessment: normal    Rhythm:     Rhythm: other rhythm    Ectopy:     Ectopy: PVCs      PVCs:  Frequent  QRS:     QRS axis:  Left    QRS intervals: Wide  ST segments:     ST segments:  Non-specific  T waves:     T waves: non-specific       ECG 12 Lead Documentation Only     Date/Time: 3/11/2022 7:16 PM  Performed by: Cassy Cote DO  Authorized by: Cassy Cote DO      Indications / Diagnosis:  Repeat  ECG reviewed by me, the ED Provider: yes    Patient location:  ED  Previous ECG:     Previous ECG:  Compared to current    Similarity:  Changes noted  Interpretation:     Interpretation: abnormal    Rate:     ECG rate:  91    ECG rate assessment: normal    Rhythm:     Rhythm: sinus rhythm    Ectopy:     Ectopy: none    QRS:     QRS axis:  Left    QRS intervals: Wide  Conduction:     Conduction: abnormal      Abnormal conduction: 1st degree    ST segments:     ST segments:  Non-specific  T waves:     T waves: non-specific        3/11 EKG:  Sinus rhythm with 1st degree A-V block  Left axis deviation  Right bundle branch block  Abnormal ECG  Confirmed by Javier Chavez ([id number]) on 3/12/2022 12:58:33 AM    3/11 EKG:  Sinus rhythm with 1st degree AV block  Left axis deviation  Right bundle branch block  Abnormal ECG  When compared with ECG of 06-JAN-2018 04:51,  No significant change was found  Left anterior fascicular block is no longer Present  Confirmed by Sandy Rai ([id number]) on 3/11/2022 5:42:38 PM    CT chest abdomen pelvis w contrast   ED Interpretation by Hernando Cornejo DO (03/11 2018)     FINDINGS:     CHEST     LUNGS:  A few small calcified granulomas  No suspicious mass or focal consolidation  There is no tracheal or endobronchial lesion      PLEURA:  Scattered pleural calcifications        HEART/GREAT VESSELS: Thoracic aortic and coronary atherosclerosis    No thoracic aortic aneurysm      MEDIASTINUM AND BIJU:  Calcified right hilar lymph nodes      CHEST WALL AND LOWER NECK:   Unremarkable      ABDOMEN     LIVER/BILIARY TREE:  One or more subcentimeter sharply circumscribed low-density hepatic lesion(s) are noted, too small to accurately characterize, but statistically most likely to represent subcentimeter hepatic cysts  No suspicious solid hepatic   lesion is identified  Hepatic contours are normal   No biliary dilatation      GALLBLADDER:  No calcified gallstones  No pericholecystic inflammatory change      SPLEEN:  Calcified granulomata are noted in the spleen  No suspicious splenic mass      PANCREAS:  2 7 cm heterogeneous pancreatic head mass  This ab   uts the superior mesenteric vein, with less than 180 degrees encasement  This does not involve the celiac artery or SMA  This abuts the main portal vein with less than 180 degrees   encasement  Mild peripancreatic edema      ADRENAL GLANDS:  Unremarkable      KIDNEYS/URETERS:  Status post left nephrectomy  Multiple right renal cysts  There is right kidney urothelial thickening and enhancement at the renal pelvis, with associated inflammatory stranding  Narrowing of the proximal ureter      STOMACH AND BOWEL:  Fecal impaction at the rectum      APPENDIX:  There are expected postoperative changes of appendectomy      ABDOMINOPELVIC CAVITY:  No ascites  No pneumoperitoneum  No lymphadenopathy      VESSELS:  Unremarkable for patient's age      PELVIS     REPRODUCTIVE ORGANS:  Status post prostatectomy      URINARY BLADDER:  Unremarkable      ABDOMINAL WALL/INGUINAL REGIONS:  Unremarkable      OSSEOUS STRUCTURES:  No acute fracture or destructive osseous lesion  Spinal degenerativ   e changes are noted    Degenerative changes of both sacroiliac joints with mixed sclerotic appearance of the bilateral iliac bones adjacent to the sacroiliac joints,   may represent degenerative or chronic stress change         IMPRESSION:     Mild peripancreatic fat stranding and edema about the body and tail, likely secondary to acute pancreatitis in the setting of recent endoscopic ultrasound and hepatic mass biopsy      Known pancreatic head mass again noted      Right renal urothelial thickening and enhancement, may represent chronic ureteritis, possible stricturing at the ureteropelvic junction      Bilateral pleural calcified plaques, possibly sequela of chronic asbestos exposure      Additional nonacute findings, as described  Final Result by Renee Hammer DO (03/11 1957)      Mild peripancreatic fat stranding and edema about the body and tail, likely secondary to acute pancreatitis in the setting of recent endoscopic ultrasound and hepatic mass biopsy  Known pancreatic head mass again noted  Right renal urothelial thickening and enhancement, may represent chronic ureteritis, possible stricturing at the ureteropelvic junction  Bilateral pleural calcified plaques, possibly sequela of chronic asbestos exposure  Additional nonacute findings, as described  I personally discussed this study with Nigel Alan on 3/11/2022 at 7:56 PM                Workstation performed: [id number]         XR chest 2 views   ED Interpretation by Yolanda Mcmahan DO (03/11 1815)   No pneumoperitoneium, no pneumomediastinum, no effusions or consolidations noted      Final Result by Mirtha Michaels MD (03/12 5100)      No acute cardiopulmonary disease  Workstation performed: [id number]               Results from last 7 days   Lab Units 03/13/22  0446 03/12/22  0615 03/11/22  2203 03/11/22  1740 03/11/22  1740   WBC Thousand/uL 10 48* 8 56  --   --  9 83   HEMOGLOBIN g/dL 12 0 11 6*  --   --  12 1   HEMATOCRIT % 38 1 36 0*  --   --  37 2   PLATELETS Thousands/uL 249 237 245   < > 258   NEUTROS ABS Thousands/µL 8 77* 7 38  --   --  8 42*    < > = values in this interval not displayed           Results from last 7 days   Lab Units 03/13/22  0446 03/12/22  0615 03/11/22  1740   SODIUM mmol/L 138 138 139 POTASSIUM mmol/L 4 0 4 2 4 3   CHLORIDE mmol/L 102 105 102   CO2 mmol/L 27 24 28   ANION GAP mmol/L 9 9 9   BUN mg/dL 19 17 21   CREATININE mg/dL 1 28 0 98 1 22   EGFR ml/min/1 73sq m 53 73 56   CALCIUM mg/dL 8 6 8 5 8 6     Results from last 7 days   Lab Units 03/12/22  0615 03/11/22  1740   AST U/L 20 20   ALT U/L 30 34   ALK PHOS U/L 79 86   TOTAL PROTEIN g/dL 7 2 7 7   ALBUMIN g/dL 2 9* 3 1*   TOTAL BILIRUBIN mg/dL 0 45 0 35   BILIRUBIN DIRECT mg/dL  --  0 12         Results from last 7 days   Lab Units 03/13/22  0446 03/12/22  0615 03/11/22  1740   GLUCOSE RANDOM mg/dL 136 130 172*       Results from last 7 days   Lab Units 03/11/22  2149 03/11/22  1908 03/11/22  1740   HS TNI 0HR ng/L  --   --  7   HS TNI 2HR ng/L  --  7  --    HSTNI D2 ng/L  --  0  --    HS TNI 4HR ng/L 10  --   --    HSTNI D4 ng/L 3  --   --        Results from last 7 days   Lab Units 03/13/22  0446 03/11/22  1740   LIPASE u/L 980* 36,750*       ED Treatment:   Medication Administration from 03/11/2022 1722 to 03/11/2022 2051       Date/Time Order Dose Route Action Action by Comments     03/11/2022 1741 aluminum-magnesium hydroxide-simethicone (MYLANTA) oral suspension 30 mL 30 mL Oral Given       03/11/2022 1741 Lidocaine Viscous HCl (XYLOCAINE) 2 % mucosal solution 15 mL 15 mL Swish & Spit Given                  03/11/2022 1844 sodium chloride 0 9 % bolus 500 mL 500 mL Intravenous New Bag                                Past Medical History:   Diagnosis Date    Anesthesia     "can't urinate after surgery"    Basal cell carcinoma     left arm/ right eyelid/ right hand/ above right eyebrow in past    Bladder mass     Cataract     rosalind    Coronary artery disease     CABG X 2 2004    Essential tremor     of head and hands bilat    Hearing aid worn     bilat    History of kidney stones     History of pneumonia     childhood    History of prostate cancer     History of transfusion     2017    Hx of radiation therapy     2007 for after prostate surgery    Hyperlipidemia     Hypertension     Kidney stone     in past    Myocardial infarction [facility])     Prostate CA (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)     and" left kidney and left ureter removed" 2017- had chemo    Urinary incontinence     wears Depends    Urinary incontinence     Wears glasses     Wears glasses     Wears partial dentures     lower     Present on Admission:   CAD (coronary artery disease)   Mild cognitive impairment      Admitting Diagnosis: Acute pancreatitis [K85 90]  Chest pain [R07 9]  Age/Sex: 64 y o  male  Admission Orders:  Scheduled Medications:  aspirin, 81 mg, Oral, Daily  heparin (porcine), 5,000 Units, Subcutaneous, Q8H [address]  melatonin, 6 mg, Oral, HS  metoprolol tartrate, 25 mg, Oral, Q12H STEPHANIE  ranolazine, 500 mg, Oral, BID      Continuous IV Infusions:  sodium chloride, 100 mL/hr, Intravenous, Continuous      PRN Meds:  HYDROmorphone, 0 5 mg, Intravenous, Q4H PRN  ondansetron, 4 mg, Intravenous, Q6H PRN  oxyCODONE, 10 mg, Oral, Q4H PRN  oxyCODONE, 5 mg, Oral, Q4H PRN    scd  Npo  oob      IP CONSULT TO [address] [place] [address] Road  Discharge- Clay Callahan 1945, 64 y o  male MRN: [id number]  Unit/Bed#: E5 -01 Encounter: [id number]  Primary Care Provider: Yuliana Gore MD   Date and time admitted to hospital: 3/11/2022  5:22 PM     * Acute pancreatitis after endoscopic retrograde cholangiopancreatography (ERCP)  Assessment & Plan  · Acute pancreatitis secondary to pancreatic head mass and recent ERCP  · Resolved  · Currently pain-free and tolerated low-fat diet  · Lipase improved significantly from more than 36,000 on admission to 980 today  · Stable for discharge home   · Outpatient follow-up with PCP  · Outpatient follow-up with [facility] regarding his pancreatic head mass and biopsy results      Diabetes mellitus [facility])  Assessment & Plan        Lab Results   Component Value Date     HGBA1C 6 9 (H) 11/08/2021      A1c is at goal  Diet controlled and not on any medications prior to admission     CAD (coronary artery disease)  Assessment & Plan  Stable  Continue aspirin, metoprolol and Ranexa  Resume statin on discharge     Severe protein-calorie malnutrition (HCC)  Assessment & Plan  Malnutrition Findings:   Adult Malnutrition type: Chronic illness  Adult Degree of Malnutrition: Other severe protein calorie malnutrition     BMI Findings:         Body mass index is 20 37 kg/m²          Mild cognitive impairment  Assessment & Plan  Stable  No events during his brief hospitalization  Patient will be going home today with wife        Solitary kidney, acquired  Assessment & Plan  Renal function stable after contrast exposure  Creatinine is at baseline        Discharging Physician / Practitioner: Tressa Miller MD  PCP: Italo Robbins MD  Admission Date:       Admission Orders (From admission, onward)              Ordered          03/11/22 2025   Inpatient Admission  Once                          Discharge Date: 03/13/22         Medical Problems                        Resolved Problems  Date Reviewed: 3/13/2022           None                     Consultations During Hospital Stay:  · GI     Procedures Performed:   · None     Significant Findings / Test Results:   · Lipase 36,750 on admission, 980 on discharge  · CT abdomen-mild perinephric fat stranding and edema     Incidental Findings:   · Known pancreatic head mass  · Right renal urothelial thickening and enhancement, may represent chronic ureteritis, possible stricture (recent workup done at [facility] in February with CT urogram)  · 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      Test Results Pending at Discharge (will require follow up): · None     Outpatient Tests Requested:  · None     Complications:  None     Reason for Admission:  Abdominal pain     Hospital Course:      Jarred Cotter is a 64 y o  male patient who originally presented to the hospital on 3/11/2022 due to epigastric abdominal pain  Earlier in the day he had an endoscopic ultrasound with biopsy done at [facility] due to a pancreatic head mass  He was found to have acute pancreatitis, likely related to his recent procedure  He was treated with bowel rest, IV fluid hydration and cautious pain control  He was evaluated by GI  He responded well with interventions and did not require any further treatment  It diet was advanced and he was able to tolerate a low-fat solid diet  He is stable for discharge home today     He CT showed incidental findings mentioned above  He should follow up with his specialists as [facility] for the pancreatic head mass as well as the known ureteral thickening  He had recent workup done there last month         Please see above list of diagnoses and related plan for additional information       Condition at Discharge: good      Discharge Day Visit / Exam:      Subjective:  Feels well  Tolerated diet  No abdominal pain nausea or vomiting  Feels ready to go home  Vitals: Blood Pressure: 115/72 (03/13/22 0750)  Pulse: 91 (03/13/22 0750)  Temperature: 97 6 °F (36 4 °C) (03/13/22 0750)  Temp Source: Oral (03/12/22 1540)  Respirations: 18 (03/13/22 0300)  Height: 5' 10" (177 8 cm) (03/11/22 1729)  Weight - Scale: 64 4 kg (142 lb) (03/11/22 1729)  SpO2: 97 % (03/13/22 0750)  Exam:   Physical Exam  Constitutional:       Appearance: He is not ill-appearing or diaphoretic  HENT:      Head: Normocephalic and atraumatic  Nose: No rhinorrhea  Eyes:      General: No scleral icterus  Cardiovascular:      Rate and Rhythm: Regular rhythm  Heart sounds: No murmur heard  No gallop      Pulmonary:      Effort: Pulmonary effort is normal  No respiratory distress  Breath sounds: No wheezing or rales  Abdominal:      General: Abdomen is flat  Palpations: Abdomen is soft  Musculoskeletal:      Cervical back: Neck supple  Right lower leg: No edema  Left lower leg: No edema  Skin:     General: Skin is warm and dry  Neurological:      Mental Status: He is alert and oriented to person, place, and time  Psychiatric:         Mood and Affect: Mood normal          Behavior: Behavior normal             Discussion with Family:  Spoke with wife and gave update     Discharge instructions/Information to patient and family:   See after visit summary for information provided to patient and family        Provisions for Follow-Up Care:  See after visit summary for information related to follow-up care and any pertinent home health orders        Disposition:      Home        Planned Readmission: no     Discharge Statement:  I spent >30 minutes discharging the patient  This time was spent on the day of discharge  I had direct contact with the patient on the day of discharge  Greater than 50% of the total time was spent examining patient, answering all patient questions, arranging and discussing plan of care with patient as well as directly providing post-discharge instructions  Additional time then spent on discharge activities      Discharge Medications:  See after visit summary for reconciled discharge medications provided to patient and family  Radha godinez

## 2022-03-16 DIAGNOSIS — I25.118 CORONARY ARTERY DISEASE OF NATIVE ARTERY OF NATIVE HEART WITH STABLE ANGINA PECTORIS (HCC): Chronic | ICD-10-CM

## 2022-03-16 RX ORDER — RANOLAZINE 500 MG/1
500 TABLET, EXTENDED RELEASE ORAL 2 TIMES DAILY
Qty: 180 TABLET | Refills: 3 | Status: SHIPPED | OUTPATIENT
Start: 2022-03-16

## 2022-03-29 ENCOUNTER — OFFICE VISIT (OUTPATIENT)
Dept: CARDIOLOGY CLINIC | Facility: CLINIC | Age: 77
End: 2022-03-29
Payer: COMMERCIAL

## 2022-03-29 VITALS
HEART RATE: 60 BPM | BODY MASS INDEX: 20.41 KG/M2 | HEIGHT: 70 IN | SYSTOLIC BLOOD PRESSURE: 106 MMHG | WEIGHT: 142.6 LBS | DIASTOLIC BLOOD PRESSURE: 54 MMHG

## 2022-03-29 DIAGNOSIS — I34.0 NON-RHEUMATIC MITRAL REGURGITATION: ICD-10-CM

## 2022-03-29 DIAGNOSIS — I45.2 BIFASCICULAR BUNDLE BRANCH BLOCK: ICD-10-CM

## 2022-03-29 DIAGNOSIS — I10 ESSENTIAL HYPERTENSION: ICD-10-CM

## 2022-03-29 DIAGNOSIS — I25.118 CORONARY ARTERY DISEASE OF NATIVE ARTERY OF NATIVE HEART WITH STABLE ANGINA PECTORIS (HCC): Primary | ICD-10-CM

## 2022-03-29 DIAGNOSIS — E78.2 MIXED HYPERLIPIDEMIA: Chronic | ICD-10-CM

## 2022-03-29 PROCEDURE — 99214 OFFICE O/P EST MOD 30 MIN: CPT | Performed by: INTERNAL MEDICINE

## 2022-03-29 NOTE — PROGRESS NOTES
Cardiology Follow Up    Nikita Cardozo  1945  [id number]  [address]  [address]  [facility] [id number] [phone]    Reason for visit: Has known CAD status post remote coronary artery bypass grafting with cardiac catheterization in 2018 showing severe native disease not amenable to intervention  At that time he had a left internal mammary artery to left anterior descending artery and saphenous vein graft to the right coronary artery which were patent  The native circumflex was the cause of his angina (also anemic at that time)  He also has hypertension, hyperlipidemia, right bundle branch block with left anterior hemiblock and moderate mitral regurgitation    recently diagnosed with pancreatic ca    has active urethral ca as well  1  Coronary artery disease of native artery of native heart with stable angina pectoris ([facility] [address] )     2  Essential hypertension     3  Non-rheumatic mitral regurgitation     4  Bifascicular bundle branch block     5  Mixed hyperlipidemia         Interval History: The patient has ongoing fatigue and wt loss  Susie Blend His appetite is somewhat better  He denies current lightheadedness  He does some exertional shortness of breath which is worse  He denies chest pain  He denies edema  He denies palpitations       Patient Active Problem List   Diagnosis    Leukocytosis    Chronic anemia    CAD (coronary artery disease)    Hyperlipidemia    Bifascicular bundle branch block    Transitional cell bladder cancer ([facility] [address] )    NSTEMI (non-ST elevated myocardial infarction) (HCC)    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    Essential hypertension    Chronic kidney disease (CKD) stage G3a/A1, moderately decreased glomerular filtration rate (GFR) between 45-59 mL/min/1 73 square meter and albuminuria creatinine ratio less than 30 mg/g (HCC)    Stress incontinence of urine    Solitary kidney, acquired    Non-rheumatic mitral regurgitation    Intraepithelial carcinoma    Acute cystitis without hematuria    CIS (carcinoma in situ of bladder)    Bladder mass    Tremor, essential    Mild cognitive impairment    Fatigue    Acute pancreatitis after endoscopic retrograde cholangiopancreatography (ERCP)    Diabetes mellitus ([address] )    Severe protein-calorie malnutrition (HCC)     Past Medical History:   Diagnosis Date    Anesthesia     "can't urinate after surgery"    Basal cell carcinoma     left arm/ right eyelid/ right hand/ above right eyebrow in past    Bladder mass     Cataract     rosalind    Coronary artery disease     CABG X 2 2004    Essential tremor     of head and hands bilat    Hearing aid worn     bilat    History of kidney stones     History of pneumonia     childhood    History of prostate cancer     History of transfusion     2017    Hx of radiation therapy     2007 for after prostate surgery    Hyperlipidemia     Hypertension     Kidney stone     in past    Myocardial infarction ([facility] [address] )     Prostate CA ([facility] [address] )     2002- had prostatectomy    Scab     black scab right thumb area- biopsy taken by md recently- bandaid over area    Transitional cell bladder cancer ([facility] [address] )     had BCG    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 ([facility] [address] )     and" left kidney and left ureter removed" 2017- had chemo    Urinary incontinence     wears Depends    Urinary incontinence     Wears glasses     Wears glasses     Wears partial dentures     lower     Social History     Socioeconomic History    Marital status: /Civil Union     Spouse name: Not on file    Number of children: Not on file    Years of education: Not on file    Highest education level: Not on file   Occupational History    Not on file   Tobacco Use    Smoking status: Never Smoker    Smokeless tobacco: Never Used   Vaping Use    Vaping Use: Never used   Substance and Sexual Activity    Alcohol use: Never     Comment: few x year    Drug use: No    Sexual activity: Not on file   Other Topics Concern    Not on file   Social History Narrative    Not on file     Social Determinants of Health     Financial Resource Strain: Not on file   Food Insecurity: Not on file   Transportation Needs: Not on file   Physical Activity: Not on file   Stress: Not on file   Social Connections: Not on file   Intimate Partner Violence: Not on file   Housing Stability: Not on file      Family History   Problem Relation Age of Onset    No Known Problems Mother     No Known Problems Father      Past Surgical History:   Procedure Laterality Date    APPENDECTOMY      BACK SURGERY      lumbar herniated disc repair    CARDIAC CATHETERIZATION      COLONOSCOPY      CORONARY ARTERY BYPASS GRAFT      x2 in 2004   [address] N/A 7/3/2017    Procedure: BLADDER BIOPSY;  Surgeon: Cecy Ovalle MD;  [facility];  Service: Urology    CYSTOSCOPY W/ RETROGRADES Right 7/3/2017    Procedure: CYSTOSCOPY WITH RETROGRADE PYELOGRAM;  Surgeon: Cecy Ovalle MD;  [facility];  Service: Urology    HERNIA REPAIR      groin    NEPHRECTOMY Left     and left ureter    ND CYSTOURETHROSCOPY,FULGUR <0 5 CM LESN Right 10/23/2018    Procedure: CYSTO, RETROGRADE, TURBT;  Surgeon: Cecy Ovalle MD;  [facility];  Service: Urology    ND INSTILL ANTICANCER AGENT IN BLADDER N/A 10/23/2018    Procedure: INSTILLATION MITOMYCIN;  Surgeon: Cecy Ovalle MD;  [facility];  Service: Urology   [address] [place] [address] Left     SHOULDER SURGERY      dislocation    SKIN CANCER EXCISION  2011    left arm and right eyebrow       Current Outpatient Medications:     aspirin 81 mg chewable tablet, Chew 81 mg daily Will stop 10/16 , Disp: , Rfl:     atorvastatin (LIPITOR) 20 mg tablet, Take 1 tablet (20 mg total) by mouth daily, Disp: 90 tablet, Rfl: 3    Cyanocobalamin (B-12) 1000 MCG CAPS, Take by mouth daily  , Disp: , Rfl:     cycloSPORINE (RESTASIS) 0 05 % ophthalmic emulsion, Apply 1 drop to eye 2 (two) times a day  , Disp: , Rfl:     furosemide (LASIX) 20 mg tablet, Take 20 mg by mouth as needed  , Disp: , Rfl:     metoprolol tartrate (LOPRESSOR) 25 mg tablet, Take 1 tablet (25 mg total) by mouth every 12 (twelve) hours, Disp: 180 tablet, Rfl: 3    Multiple Vitamin (MULTIVITAMIN) tablet, Take 1 tablet by mouth daily, Disp: , Rfl:     Multiple Vitamins-Minerals (PRESERVISION AREDS 2 PO), Take by mouth, Disp: , Rfl:     nitroglycerin (NITROSTAT) 0 4 mg SL tablet, Place 1 tablet (0 4 mg total) under the tongue every 5 (five) minutes as needed for chest pain, Disp: 25 tablet, Rfl: 1    ranolazine (RANEXA) 500 mg 12 hr tablet, Take 1 tablet (500 mg total) by mouth 2 (two) times a day, Disp: 180 tablet, Rfl: 3    isosorbide mononitrate (IMDUR) 30 mg 24 hr tablet, Take 1 tablet (30 mg total) by mouth daily (Patient not taking: Reported on 1/10/2022 ), Disp: 90 tablet, Rfl: 3  Allergies   Allergen Reactions    Levaquin [Levofloxacin] Rash    Percocet [Oxycodone-Acetaminophen] GI Intolerance     Constipation,,,happens with narcotics like percocet    Codeine Other (See Comments)    Lovastatin Other (See Comments)     myopathy     Echo complete w/ contrast if indicated    Result Date: 3/9/2022  Narrative: Evan Nichols  Left Ventricle: Left ventricular cavity size is normal  Wall thickness is normal  The left ventricular ejection fraction is 52%  Systolic function is normal  Wall motion is normal  Diastolic function is mildly abnormal, consistent with grade I (abnormal) relaxation    Left Atrium: The atrium is mildly dilated    Right Atrium: The atrium is mildly dilated    Aortic Valve: There is mild regurgitation    Mitral Valve: There is mild regurgitation    Tricuspid Valve: There is mild regurgitation  Review of Systems:  Review of Systems   Constitutional: Positive for fatigue and unexpected weight change   Negative for activity change and appetite change  Respiratory: Positive for shortness of breath  Negative for cough, chest tightness and wheezing  Cardiovascular: Negative for chest pain, palpitations and leg swelling  Gastrointestinal: Negative for abdominal pain, blood in stool, constipation and diarrhea  Genitourinary: Positive for frequency (incontinence)  Negative for hematuria  Musculoskeletal: Positive for back pain  Negative for arthralgias, gait problem and joint swelling  Neurological: Positive for numbness  Negative for dizziness, syncope, light-headedness and headaches  Psychiatric/Behavioral: Negative for agitation, behavioral problems, confusion and decreased concentration  Physical Exam:  Vitals:    03/29/22 0947   BP: 106/54   BP Location: Left arm   Patient Position: Sitting   Cuff Size: Adult   Pulse: 60   Weight: 64 7 kg (142 lb 9 6 oz)   Height: 5' 10" (1 778 m)       Physical Exam  Constitutional:       General: He is not in acute distress  Appearance: He is normal weight  He is not ill-appearing  HENT:      Head: Normocephalic and atraumatic  Mouth/Throat:      Mouth: Mucous membranes are moist       Pharynx: No oropharyngeal exudate or posterior oropharyngeal erythema  Eyes:      General: No scleral icterus  Conjunctiva/sclera: Conjunctivae normal    Neck:      Thyroid: No thyroid mass or thyromegaly  Vascular: Normal carotid pulses  No carotid bruit or JVD  Cardiovascular:      Rate and Rhythm: Normal rate and regular rhythm  Pulses:           Dorsalis pedis pulses are 0 on the right side and 0 on the left side  Posterior tibial pulses are 0 on the right side and 0 on the left side  Heart sounds: Murmur heard  Crescendo decrescendo systolic (basal) murmur is present with a grade of 1/6  No diastolic murmur is present  No friction rub  No gallop  Pulmonary:      Breath sounds: Decreased breath sounds present   No wheezing, rhonchi or rales    Abdominal:      Palpations: Abdomen is soft  There is no hepatomegaly, splenomegaly or mass  Tenderness: There is no abdominal tenderness  Musculoskeletal:         General: Deformity (kyphosis) present  No swelling  Cervical back: Neck supple  Skin:     General: Skin is warm  Coloration: Skin is not jaundiced or pale  Findings: No bruising, erythema, lesion or rash  Neurological:      General: No focal deficit present  Mental Status: He is oriented to person, place, and time  Cranial Nerves: No cranial nerve deficit  Sensory: No sensory deficit  Motor: No weakness  Psychiatric:         Mood and Affect: Mood normal          Behavior: Behavior normal          Thought Content: Thought content normal          Judgment: Judgment normal          Discussion/Summary:  1  CAD status post remote CABG with more recent cardiac catheterization in 2018 showing 2 patent grafts with ungrafted circumflex showing disease which had been responsible for his angina  Patient angina free low-dose aspirin, metoprolol 25 mg b i d  And ranolazine  This taken nitrates and has not as result of this  2  Hypertension  Blood pressure excellent on low-dose metoprolol 25 b i d   3  Mitral regurgitation  moderate on echo 2018 but mild on echo done recently  4  Bifascicular bundle branch block  Heart rate today 60  No symptoms suggest profound bradycardia or pauses  5  Hyperlipidemia  Patient on atorvastatin 20 mg daily    Most recent LDL cholesterol from earlier this month 59 with HDL cholesterol 23 and triglycerides 167      FU 6 months with associate      Nati Schwartz MD

## 2022-10-04 ENCOUNTER — OFFICE VISIT (OUTPATIENT)
Dept: CARDIOLOGY CLINIC | Facility: CLINIC | Age: 77
End: 2022-10-04
Payer: COMMERCIAL

## 2022-10-04 VITALS
DIASTOLIC BLOOD PRESSURE: 60 MMHG | OXYGEN SATURATION: 96 % | HEIGHT: 70 IN | SYSTOLIC BLOOD PRESSURE: 100 MMHG | BODY MASS INDEX: 20.13 KG/M2 | WEIGHT: 140.6 LBS | HEART RATE: 62 BPM

## 2022-10-04 DIAGNOSIS — N18.31 CHRONIC KIDNEY DISEASE (CKD) STAGE G3A/A1, MODERATELY DECREASED GLOMERULAR FILTRATION RATE (GFR) BETWEEN 45-59 ML/MIN/1.73 SQUARE METER AND ALBUMINURIA CREATININE RATIO LESS THAN 30 MG/G (HCC): ICD-10-CM

## 2022-10-04 DIAGNOSIS — C67.9 TRANSITIONAL CELL BLADDER CANCER (HCC): Chronic | ICD-10-CM

## 2022-10-04 DIAGNOSIS — I25.118 CORONARY ARTERY DISEASE OF NATIVE ARTERY OF NATIVE HEART WITH STABLE ANGINA PECTORIS (HCC): Primary | ICD-10-CM

## 2022-10-04 DIAGNOSIS — E43 SEVERE PROTEIN-CALORIE MALNUTRITION (HCC): ICD-10-CM

## 2022-10-04 DIAGNOSIS — E78.2 MIXED HYPERLIPIDEMIA: Chronic | ICD-10-CM

## 2022-10-04 DIAGNOSIS — I34.0 NON-RHEUMATIC MITRAL REGURGITATION: ICD-10-CM

## 2022-10-04 DIAGNOSIS — I21.4 NSTEMI (NON-ST ELEVATED MYOCARDIAL INFARCTION) (HCC): ICD-10-CM

## 2022-10-04 DIAGNOSIS — G25.0 TREMOR, ESSENTIAL: ICD-10-CM

## 2022-10-04 DIAGNOSIS — I10 ESSENTIAL HYPERTENSION: ICD-10-CM

## 2022-10-04 PROCEDURE — 99214 OFFICE O/P EST MOD 30 MIN: CPT

## 2022-10-04 RX ORDER — OMEPRAZOLE 40 MG/1
CAPSULE, DELAYED RELEASE ORAL
COMMUNITY
Start: 2022-09-25

## 2022-10-04 RX ORDER — PANCRELIPASE 30000; 6000; 19000 [USP'U]/1; [USP'U]/1; [USP'U]/1
6000 CAPSULE, DELAYED RELEASE PELLETS ORAL
COMMUNITY
Start: 2022-09-26

## 2022-10-04 NOTE — PROGRESS NOTES
Cardiology   MD Davion Paul MD Rollen Lis, DO, JAYLA Rene MD Amie Mason, DO, Damian Cardenas DO, [facility]  -------------------------------------------------------------------  [facility], [facility] [address] [id number] [phone]  [phone]  10/04/22  Hollie Pham  YOB: 1945   MRN: [id number]      Referring Physician: No referring provider defined for this encounter  HPI: Hollie Pham is a 76 y o  male with:   Coronary artery disease with coronary artery bypass graft surgery in 2004 with catheterization in 2018 that showed severe native disease that was not amenable to intervention at that time he had a left internal mammary artery to left anterior descending artery and saphenous vein graft to the right coronary artery which were patent  The native circumflex is the cause of his angina  Hypertension  Hyperlipidemia  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  Moderate mitral regurgitation  Recently diagnosed with pancreatic cancer  Has active urethral cancer  Prior history of bladder cancer and left kidney cancer - where he has had a left nephrectomy    He presents today for follow-up with Cardiology  He was last seen in March of 2022 with Dr Miller Fent  He was doing well at that time and from a heart standpoint he appears to be doing well today  He denies any significant angina type symptoms but does note some shortness of breath when he really pushes himself too much  He states he carries nitroglycerin with him but he has not had to take any  He also is on Lasix as needed but he has not had to take any of that either  His blood pressure is controlled today at 100/60  He has been losing weight over the past year however and has lost about 45 lb  Review of Systems   Constitutional: Positive for fatigue  Negative for chills and fever     HENT: Negative for facial swelling and sore throat  Eyes: Negative for visual disturbance  Respiratory: Negative for cough, chest tightness, shortness of breath and wheezing  Cardiovascular: Negative for chest pain, palpitations and leg swelling  Gastrointestinal: Negative for abdominal pain, blood in stool, constipation, diarrhea, nausea and vomiting  Endocrine: Negative for cold intolerance and heat intolerance  Genitourinary: Negative for decreased urine volume, difficulty urinating, dysuria and hematuria  Musculoskeletal: Negative for arthralgias, back pain and myalgias  Skin: Negative for rash  Neurological: Negative for dizziness, syncope, weakness and numbness  Psychiatric/Behavioral: Negative for agitation, behavioral problems and confusion  The patient is not nervous/anxious  OBJECTIVE  Vitals:    10/04/22 1017   BP: 100/60   Pulse: 62   SpO2: 96%       Physical Exam  Constitutional: awake, alert and oriented, in no acute distress, no obvious deformities  Head: Normocephalic, without obvious abnormality, atraumatic  Eyes: conjunctivae clear and moist  Sclera anicteric  No xanthelasmas  Pupils equal bilaterally  Extraocular motions are full  Ear nose mouth and throat: ears are symmetrical bilaterally, hearing appears to be equal bilaterally, no nasal discharge or epistaxis, oropharynx is clear with moist mucous membranes  Neck:  Trachea is midline, neck is supple, no thyromegaly or significant lymphadenopathy, there is full range of motion    Lungs: clear to auscultation bilaterally, no wheezes, no rales, no rhonchi, no accessory muscle use, breathing is nonlabored  Heart: regular rate and rhythm, S1, S2 normal, no murmur, no click, no rub and no gallop, no lower extremity edema  Abdomen: soft, non-tender; bowel sounds normal; no masses,  no organomegaly  Psychiatric:  Patient is oriented to time, place, person, mood/affect is negative for depression, anxiety, agitation, appears to have appropriate insight  Skin: Skin is warm, dry, intact  No obvious rashes or lesions on exposed extremities  Nail beds are pink with no cyanosis or clubbing  EKG:  No results found for this visit on 10/04/22  IMPRESSION:  Coronary artery disease with coronary artery bypass graft surgery in 2004 with catheterization in 2018 that showed severe native disease that was not amenable to intervention at that time he had a left internal mammary artery to left anterior descending artery and saphenous vein graft to the right coronary artery which were patent  The native circumflex is the cause of his angina  Hypertension  Hyperlipidemia  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  Moderate mitral regurgitation  Recently diagnosed with pancreatic cancer  Has active urethral cancer  Prior history of bladder cancer and left kidney cancer - where he has had a left nephrectomy    DISCUSSION/RECOMMENDATIONS:  He presents today to follow-up on his coronary disease  Would continue with aspirin 81, Lipitor 20  Continue with Lasix as needed for which he really does not need to take this on any significant frequent basis at all as well as as needed nitroglycerin for which again he does not really ever take  Continue with Ranexa, metoprolol tartrate 25 mg every 12 hours  He had echo in March of 2022 which shows EF 60%, grade 1 diastolic dysfunction with mild valvular heart disease of the aortic mitral and tricuspid valve with mild regurgitation and mildly dilated left and right atria    His LDL was 59 in March of 2022  Would continue the above medications at current doses without alteration today    Hipolito Metcalf DO, [facility], [facility]  --------------------------------------------------------------------------------  TREADMILL STRESS  No results found for this or any previous visit      ----------------------------------------------------------------------------------------------  NUCLEAR STRESS TEST: No results found for this or any previous visit  No results found for this or any previous visit       --------------------------------------------------------------------------------  CATH:  Results for orders placed during the hospital encounter of 18    Cardiac catheterization    Narrative  [facility]  [address] [facility] [place] [facility], [address] [place] [address]  [phone]    [facility]    Invasive Cardiovascular Lab Complete Report    Patient: Puneet Flores  MR number: [id number]  Account number: [de-identified]  Study date: 2018  Gender: Male  : 1945  Height: 70 1 in  Weight: 175 6 lb  BSA: 1 98 m squared    Allergies: LEVOFLOXACIN, OXYCODONE-ACETAMINOPHEN    Diagnostic Cardiologist:  Harmony Cruz MD  Primary Physician:  Annika Cutler    SUMMARY    CORONARY CIRCULATION:  Left main: Normal   LAD: There was a 100 % stenosis in mid vessel  The mid and distal LAD fill via a LIMA conduit  Circumflex: The vessel was normal sized and gives rise to a major OM branch  The OM is completely occluded distally, and fills by collateral flow from the grafted RCA  RCA: The vessel was normal sized and dominant  There was a total occlusion in mid vessel  The distal RCA and PDA fill via an SVG  Graft to the LAD: The graft was a LIMA  The body of the graft and the anastomosis were normal  The grafted mid and distal LAD filled well and were free of critical disease  The LAD sent collateral flow to D1  Graft to the distal RCA: The graft was a saphenous vein graft from the ascending aorta  The body of the graft and the distal anastomosis were normal  The grafted distal RCA/PDA filled well and were free of critical disease  The SVG/RCA  sent collateral flow to the occluded OM  REPORT ELEMENT SELECTION:  Right femoral access was employed  Hemostasis was achieved with a Perclose suture device  Summary:  Multivessel CAD, grafts patent to LAD & distal RCA   Symptoms/demand ischemia most likely due to occluded distal OM & occluded D1  These are not suitable for percutaneous revascularization  Continued medical therapy is indicated  Would  anticipate episodes of ischemia with moderate troponin release in the future  Invasive studies are probably best reserved for instances of objective evidence of anterior or inferior ischemia  INDICATIONS:  --  Possible CAD: myocardial infarction without ST elevation (NSTEMI)  PROCEDURES PERFORMED    --  Left coronary angiography  --  Right coronary angiography  --  Saphenous vein graft angiography  --  LIMA graft angiography  --  Inpatient  --  Mod Sedation Same Physician Initial 15min  --  Coronary Catheterization (w/o LHC, w/ Grafts  --  Mod Sedation Same Physician Add 15min  PROCEDURE: The risks and alternatives of the procedures and conscious sedation were explained to the patient and informed consent was obtained  The patient was brought to the cath lab and placed on the table  The planned puncture sites  were prepped and draped in the usual sterile fashion  --  Right femoral artery access  The puncture site was infiltrated with local anesthetic  The vessel was accessed using the modified Seldinger technique, a wire was advanced into the vessel, and a sheath was advanced over the wire into the  vessel  --  Left coronary artery angiography  A catheter was advanced over a guidewire into the aorta and positioned in the left coronary artery ostium under fluoroscopic guidance  Angiography was performed  --  Right coronary artery angiography  A catheter was advanced over a guidewire into the aorta and positioned in the right coronary artery ostium under fluoroscopic guidance  Angiography was performed  --  Saphenous vein graft angiography  A catheter was advanced to the aorta and positioned at the aortic anastomosis of the graft over a guidewire under fluoroscopic guidance  Angiography was performed  --  Left internal mammary graft angiography   A catheter was advanced to the aorta and positioned in the left subclavian artery over a guidewire under fluoroscopic guidance  Angiography was performed  --  Inpatient  --  Mod Sedation Same Physician Initial 15min  --  Coronary Catheterization (w/o LHC, w/ Grafts  --  Mod Sedation Same Physician Add 15min  PROCEDURE COMPLETION: The patient tolerated the procedure well and was discharged from the cath lab  TIMING: Test started at 09:39  Test concluded at 10:06  HEMOSTASIS: The sheath was removed  The access site was closed using the Perclose  suture device  Hemostasis was obtained  MEDICATIONS GIVEN: Fentanyl (1OOmcg/2 ml), 50 mcg, IV, at 09:34  Versed (2mg/2ml), 2 mg, IV, at 09:34  Versed (2mg/2ml), 1 mg, IV, at 09:41  Fentanyl (1OOmcg/2 ml), 25 mcg, IV, at 09:41  1%  Lidocaine, 5 ml, subcutaneously, at 09:41  CONTRAST GIVEN: 70 ml Omnipaque (350 mg I /ml)  RADIATION EXPOSURE: Fluoroscopy time: 8 9 min  CORONARY VESSELS:   --  Left main: Normal   --  LAD: There was a 100 % stenosis in mid vessel  The mid and distal LAD fill via a LIMA conduit  --  Circumflex: The vessel was normal sized and gives rise to a major OM branch  The OM is completely occluded distally, and fills by collateral flow from the grafted RCA  --  RCA: The vessel was normal sized and dominant  There was a total occlusion in mid vessel  The distal RCA and PDA fill via an SVG  --  Graft to the LAD: The graft was a LIMA  The body of the graft and the anastomosis were normal  The grafted mid and distal LAD filled well and were free of critical disease  The LAD sent collateral flow to D1   --  Graft to the distal RCA: The graft was a saphenous vein graft from the ascending aorta  The body of the graft and the distal anastomosis were normal  The grafted distal RCA/PDA filled well and were free of critical disease  The SVG/RCA  sent collateral flow to the occluded OM  NOTE: Right femoral access was employed   Hemostasis was achieved with a Perclose suture device  Prepared and signed by    Peewee Erwin MD  Signed 2018 10:29:26    CC: Dr Aravind Noyola    CC: Dr Jossie Uribe    Study diagram    Angiographic findings  Native coronary lesions:  ·LAD: Lesion 1: 100 % stenosis  Hemodynamic tables    Pressures:  Baseline  Pressures:  - HR: 72  Pressures:  - Rhythm:  Pressures:  -- Aortic Pressure (S/D/M): 115/62/79    Outputs:  Baseline  Outputs:  -- CALCULATIONS: [age]  Outputs:  -- CALCULATIONS: Body Surface Area: 1 98  Outputs:  -- CALCULATIONS: Height in cm: 178 00  Outputs:  -- CALCULATIONS: Sex: Male  Outputs:  -- CALCULATIONS: Weight in k 80    --------------------------------------------------------------------------------  ECHO:   Results for orders placed during the hospital encounter of 18    Echo complete with contrast if indicated    Narrative  [address] [place] [address], [address] [facility]  [phone]    Transthoracic Echocardiogram  2D, M-mode, Doppler, and Color Doppler    Study date:  2018    Patient: Arthur Torres  MR number: [id number]  Account number: [de-identified]  : 1945  Age: 74 years  Gender: Male  Status: Outpatient  Location: [facility] [place]  Height: 70 in  Weight: 166 5 lb  BP: 122/ 68 mmHg    Indications: Assess left ventricular function  Diagnoses: I25 83 - Coronary atherosclerosis due to lipid rich plaque, R07 9 - Chest pain, unspecified    Sonographer:  MARCK Hill  Primary Physician:  Flip Ricci MD  Referring Physician:  AJ Chapin  Group:  [facility]  Interpreting Physician:  Jeremie Nuno MD    SUMMARY    LEFT VENTRICLE:  Systolic function was normal  Ejection fraction was estimated to be 60 %  There were no regional wall motion abnormalities    Doppler parameters were consistent with abnormal left ventricular relaxation (grade 1 diastolic dysfunction)  VENTRICULAR SEPTUM:  There was "bounce" motion  LEFT ATRIUM:  The atrium was mildly to moderately dilated  MITRAL VALVE:  There was moderate regurgitation  AORTIC VALVE:  There was mild regurgitation  TRICUSPID VALVE:  There was mild to moderate regurgitation  HISTORY: PRIOR HISTORY: CAD, prostate cancer, CABG, hypertension, hyperlipidemia, RBBB    PROCEDURE: The study was performed in the [address] [place] [address] [facility] [place]  This was a routine study  The transthoracic approach was used  The study included complete 2D imaging, M-mode, complete spectral Doppler, and color Doppler  Image  quality was adequate  LEFT VENTRICLE: Size was normal  Systolic function was normal  Ejection fraction was estimated to be 60 %  There were no regional wall motion abnormalities  Wall thickness was normal  No evidence of apical thrombus  DOPPLER: Doppler  parameters were consistent with abnormal left ventricular relaxation (grade 1 diastolic dysfunction)  VENTRICULAR SEPTUM: There was "bounce" motion  RIGHT VENTRICLE: The size was normal  Systolic function was normal  Wall thickness was normal     LEFT ATRIUM: The atrium was mildly to moderately dilated  RIGHT ATRIUM: Size was normal     MITRAL VALVE: Valve structure was normal  There was normal leaflet separation  DOPPLER: The transmitral velocity was within the normal range  There was no evidence for stenosis  There was moderate regurgitation  AORTIC VALVE: The valve was trileaflet  Leaflets exhibited normal thickness, mild calcification, and normal cuspal separation  DOPPLER: Transaortic velocity was within the normal range  There was no evidence for stenosis  There was mild  regurgitation  TRICUSPID VALVE: The valve structure was normal  There was normal leaflet separation  DOPPLER: The transtricuspid velocity was within the normal range  There was no evidence for stenosis  There was mild to moderate regurgitation   Estimated  peak PA pressure was 48 mmHg  PULMONIC VALVE: Leaflets exhibited normal thickness, no calcification, and normal cuspal separation  DOPPLER: The transpulmonic velocity was within the normal range  There was no significant regurgitation  PERICARDIUM: There was no pericardial effusion  The pericardium was normal in appearance  AORTA: The root exhibited normal size and fibrocalcific change  SYSTEMIC VEINS: IVC: The inferior vena cava was normal in size  Respirophasic changes were normal     SYSTEM MEASUREMENT TABLES    2D  %FS: 24 8 %  Ao Diam: 3 4 cm  EDV(Teich): 110 1 ml  EF(Teich): 49 1 %  ESV(Teich): 56 1 ml  IVSd: 1 2 cm  LA Area: 32 9 cm2  LA Diam: 4 8 cm  LVEDV MOD A4C: 207 4 ml  LVEF MOD A4C: 61 %  LVESV MOD A4C: 80 8 ml  LVIDd: 4 8 cm  LVIDs: 3 6 cm  LVLd A4C: 8 7 cm  LVLs A4C: 7 1 cm  LVPWd: 1 5 cm  RA Area: 19 5 cm2  RVIDd: 3 9 cm  SV MOD A4C: 126 6 ml  SV(Teich): 54 ml    CW  AR Dec [place]: 1 5 m/s2  AR Dec Time: 2630 3 ms  AR PHT: 762 8 ms  AR Vmax: 4 m/s  AR maxP 1 mmHg  TR Vmax: 3 3 m/s  TR maxP 4 mmHg    MM  TAPSE: 2 cm    PW  E': 0 1 m/s  E/E': 10 9  MV A Drew: 0 6 m/s  MV Dec [place]: 2 6 m/s2  MV DecT: 267 1 ms  MV E Drew: 0 7 m/s  MV E/A Ratio: 1 1  MV PHT: 77 4 ms  MVA By PHT: 2 8 cm2    [facility]    Prepared and electronically signed by    Jeff Contreras MD  Signed [id number] 09:43:23    No results found for this or any previous visit     --------------------------------------------------------------------------------  HOLTER  No results found for this or any previous visit      No results found for this or any previous visit     --------------------------------------------------------------------------------  CAROTIDS  No results found for this or any previous visit      --------------------------------------------------------------------------------  Diagnoses and all orders for this visit:    Coronary artery disease of native artery of native heart with stable angina pectoris (HCC)    NSTEMI (non-ST elevated myocardial infarction) [facility])    Essential hypertension    Non-rheumatic mitral regurgitation    Tremor, essential    Transitional cell bladder cancer (HCC)    Chronic kidney disease (CKD) stage G3a/A1, moderately decreased glomerular filtration rate (GFR) between 45-59 mL/min/1 73 square meter and albuminuria creatinine ratio less than 30 mg/g (HCC)    Mixed hyperlipidemia    Severe protein-calorie malnutrition ([address] )    Other orders  -     omeprazole (PriLOSEC) 40 MG capsule  -     Creon 6000-41147 units delayed release capsule;  Take 6,000 Units by mouth 3 (three) times a day with meals     ======================================================    Past Medical History:   Diagnosis Date    Anesthesia     "can't urinate after surgery"    Basal cell carcinoma     left arm/ right eyelid/ right hand/ above right eyebrow in past    Bladder mass     Cataract     rosalind    Coronary artery disease     CABG X 2 2004    Essential tremor     of head and hands bilat    Hearing aid worn     bilat    History of kidney stones     History of pneumonia     childhood    History of prostate cancer     History of transfusion     2017    Hx of radiation therapy     2007 for after prostate surgery    Hyperlipidemia     Hypertension     Kidney stone     in past    Myocardial infarction ([address] )     Prostate CA ([address] )     2002- had prostatectomy    Scab     black scab right thumb area- biopsy taken by md bruce- bandaid over area    Transitional cell bladder cancer ([address] )     had BCG    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 ([address] )     and" left kidney and left ureter removed" 2017- had chemo    Urinary incontinence     wears Depends    Urinary incontinence     Wears glasses     Wears glasses     Wears partial dentures     lower     Past Surgical History:   Procedure Laterality Date    APPENDECTOMY      BACK SURGERY      lumbar herniated disc repair    CARDIAC CATHETERIZATION      COLONOSCOPY      CORONARY ARTERY BYPASS GRAFT      x2 in 2004   [address] N/A 7/3/2017    Procedure: BLADDER BIOPSY;  Surgeon: Belen Soni MD;  [facility];  Service: Urology    CYSTOSCOPY W/ RETROGRADES Right 7/3/2017    Procedure: CYSTOSCOPY WITH RETROGRADE PYELOGRAM;  Surgeon: Bleen Soni MD;  [facility];  Service: Urology    HERNIA REPAIR      groin    NEPHRECTOMY Left     and left ureter    NC CYSTOURETHROSCOPY,FULGUR <0 5 CM LESN Right 10/23/2018    Procedure: Zoila Leventhal, TURBT;  Surgeon: Belen Soni MD;  [facility];  Service: Urology    NC INSTILL ANTICANCER AGENT IN BLADDER N/A 10/23/2018    Procedure: INSTILLATION MITOMYCIN;  Surgeon: Belen Soni MD;  [facility];  Service: Urology    PROSTATECTOMY      ROTATOR CUFF REPAIR Left     SHOULDER SURGERY      dislocation    SKIN CANCER EXCISION  2011    left arm and right eyebrow         Medications  Current Outpatient Medications   Medication Sig Dispense Refill    aspirin 81 mg chewable tablet Chew 81 mg daily Will stop 10/16       atorvastatin (LIPITOR) 20 mg tablet Take 1 tablet (20 mg total) by mouth daily 90 tablet 3    Creon 6000-58079 units delayed release capsule Take 6,000 Units by mouth 3 (three) times a day with meals      Cyanocobalamin (B-12) 1000 MCG CAPS Take 500 mcg by mouth daily      cycloSPORINE (RESTASIS) 0 05 % ophthalmic emulsion Apply 1 drop to eye 2 (two) times a day        furosemide (LASIX) 20 mg tablet Take 20 mg by mouth as needed        metoprolol tartrate (LOPRESSOR) 25 mg tablet Take 1 tablet (25 mg total) by mouth every 12 (twelve) hours 180 tablet 3    Multiple Vitamin (MULTIVITAMIN) tablet Take 1 tablet by mouth daily      Multiple Vitamins-Minerals (PRESERVISION AREDS 2 PO) Take by mouth      omeprazole (PriLOSEC) 40 MG capsule       ranolazine (RANEXA) 500 mg 12 hr tablet Take 1 tablet (500 mg total) by mouth 2 (two) times a day 180 tablet 3    nitroglycerin (NITROSTAT) 0 4 mg SL tablet Place 1 tablet (0 4 mg total) under the tongue every 5 (five) minutes as needed for chest pain (Patient not taking: Reported on 10/4/2022) 25 tablet 1     No current facility-administered medications for this visit          Allergies   Allergen Reactions    Levaquin [Levofloxacin] Rash    Percocet [Oxycodone-Acetaminophen] GI Intolerance     Constipation,,,happens with narcotics like percocet    Codeine Other (See Comments)    Lovastatin Other (See Comments)     myopathy       Social History     Socioeconomic History    Marital status: /Civil Union     Spouse name: Not on file    Number of children: Not on file    Years of education: Not on file    Highest education level: Not on file   Occupational History    Not on file   Tobacco Use    Smoking status: Never Smoker    Smokeless tobacco: Never Used   Vaping Use    Vaping Use: Never used   Substance and Sexual Activity    Alcohol use: Never     Comment: few x year    Drug use: No    Sexual activity: Not on file   Other Topics Concern    Not on file   Social History Narrative    Not on file     Social Determinants of Health     Financial Resource Strain: Not on file   Food Insecurity: Not on file   Transportation Needs: Not on file   Physical Activity: Not on file   Stress: Not on file   Social Connections: Not on file   Intimate Partner Violence: Not on file   Housing Stability: Not on file        Family History   Problem Relation Age of Onset    No Known Problems Mother     No Known Problems Father        Lab Results   Component Value Date    WBC 10 48 (H) 03/13/2022    HGB 12 0 03/13/2022    HCT 38 1 03/13/2022    MCV 88 03/13/2022     03/13/2022      Lab Results   Component Value Date    SODIUM 138 03/13/2022    K 4 0 03/13/2022     03/13/2022    CO2 27 03/13/2022    BUN 19 03/13/2022    CREATININE 1 28 03/13/2022 GLUC 136 03/13/2022    CALCIUM 8 6 03/13/2022      Lab Results   Component Value Date    HGBA1C 7 3 (H) 04/26/2022      No results found for: CHOL  Lab Results   Component Value Date    HDL 23 (L) 03/12/2022    HDL 35 (L) 11/04/2019    HDL 34 (L) 01/06/2018     Lab Results   Component Value Date    LDLCALC 59 03/12/2022    LDLCALC 56 11/04/2019    LDLCALC 44 01/06/2018     Lab Results   Component Value Date    TRIG 167 (H) 03/12/2022    TRIG 339 (H) 11/04/2019    TRIG 124 01/06/2018     No results found for: [place], [place]   Lab Results   Component Value Date    INR 1 09 01/09/2018    INR 1 12 01/06/2018    PROTIME 14 1 01/09/2018    PROTIME 14 4 01/06/2018          Patient Active Problem List    Diagnosis Date Noted    [facility]-cal[facility] 03/12/2022    Acute pancreatitis after endoscopic retrograde cholangiopancreatography (ERCP) 03/11/2022    Diabetes mellitus ([facility] ) 03/11/2022    Fatigue 01/09/2022    Tremor, essential 09/03/2021    Mild cognitive impairment 09/03/2021    Bladder mass 10/09/2018    CIS (carcinoma in situ of bladder) 10/08/2018    Acute cystitis without hematuria 05/24/2018    Intraepithelial carcinoma 05/07/2018    Non-rheumatic mitral regurgitation 02/20/2018    Solitary kidney, acquired 02/06/2018    Stress incontinence of urine 02/05/2018    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 01/11/2018    Essential hypertension 01/11/2018    Chronic kidney disease (CKD) stage G3a/A1, moderately decreased glomerular filtration rate (GFR) between 45-59 mL/min/1 73 square meter and albuminuria creatinine ratio less than 30 mg/g ([facility] ) 01/11/2018    NSTEMI (non-ST elevated myocardial infarction) ([facility] ) 01/06/2018    Leukocytosis 01/05/2018    Chronic anemia 01/05/2018    CAD (coronary artery disease) 01/05/2018    Hyperlipidemia 01/05/2018    Bifascicular bundle branch block 01/05/2018    Transitional cell bladder cancer ([facility] ) 01/05/2018       Portions of the record may have been created with voice recognition software  Occasional wrong word or "sound a like" substitutions may have occurred due to the inherent limitations of voice recognition software  Read the chart carefully and recognize, using context, where substitutions have occurred      Keyanna Ospina DO, [facility] [place]  10/4/2022 10:55 AM

## 2022-10-13 ENCOUNTER — HOME CARE VISIT (OUTPATIENT)
Dept: HOME HEALTH SERVICES | Facility: HOME HEALTHCARE | Age: 77
End: 2022-10-13

## (undated) DEVICE — TUBING SUCTION 5MM X 12 FT

## (undated) DEVICE — REM POLYHESIVE ADULT PATIENT RETURN ELECTRODE: Brand: VALLEYLAB

## (undated) DEVICE — BAG URINE DRAINAGE 2000ML ANTI RFLX LF

## (undated) DEVICE — LUBRICANT SURGILUBE TUBE 4 OZ  FLIP TOP

## (undated) DEVICE — SURGICAL GOWN, XL SMARTSLEEVE: Brand: CONVERTORS

## (undated) DEVICE — SPECIMEN CONTAINER STERILE PEEL PACK

## (undated) DEVICE — Device: Brand: OLYMPUS

## (undated) DEVICE — PACK TUR

## (undated) DEVICE — SCD SEQUENTIAL COMPRESSION COMFORT SLEEVE MEDIUM KNEE LENGTH: Brand: KENDALL SCD

## (undated) DEVICE — CATH URETERAL 5FR X 70 CM FLEX TIP POLYUR BARD

## (undated) DEVICE — UROCATCH BAG

## (undated) DEVICE — TELFA NON-ADHERENT ABSORBENT DRESSING: Brand: TELFA

## (undated) DEVICE — CATH FOLEY 22FR 5ML 2 WAY SILICONE ELASTIMER

## (undated) DEVICE — EVACUATOR BLADDER ELLIK DISP STRL

## (undated) DEVICE — BAG DECANTER

## (undated) DEVICE — CATH FOLEY 20FR 5ML 2 WAY SILICONE ELASTIMER

## (undated) DEVICE — INVIEW CLEAR LEGGINGS: Brand: CONVERTORS

## (undated) DEVICE — 60 ML SYRINGE,TOOMEY TYPE: Brand: MONOJECT

## (undated) DEVICE — CHEMOTHERAPY CONTAINER,HINGED LID, YELLOW: Brand: SHARPSAFETY

## (undated) DEVICE — CATH FOLEY 18FR 5ML 2 WAY SILICONE ELASTIMER

## (undated) DEVICE — NEEDLE 18 G X 1 1/2

## (undated) DEVICE — GLOVE SRG BIOGEL 7.5

## (undated) DEVICE — CATH FOLEY 24FR 5ML 2 WAY SILICONE ELASTIMER

## (undated) DEVICE — BASIC SINGLE BASIN-LF: Brand: MEDLINE INDUSTRIES, INC.

## (undated) DEVICE — GUARDIAN LVC: Brand: GUARDIAN